# Patient Record
Sex: FEMALE | Race: WHITE | NOT HISPANIC OR LATINO | Employment: UNEMPLOYED | URBAN - METROPOLITAN AREA
[De-identification: names, ages, dates, MRNs, and addresses within clinical notes are randomized per-mention and may not be internally consistent; named-entity substitution may affect disease eponyms.]

---

## 2017-10-14 LAB — HBA1C MFR BLD HPLC: 5.1 %

## 2018-11-08 ENCOUNTER — OFFICE VISIT (OUTPATIENT)
Dept: URGENT CARE | Facility: CLINIC | Age: 33
End: 2018-11-08
Payer: COMMERCIAL

## 2018-11-08 VITALS
TEMPERATURE: 98.5 F | WEIGHT: 170 LBS | SYSTOLIC BLOOD PRESSURE: 110 MMHG | HEART RATE: 100 BPM | RESPIRATION RATE: 16 BRPM | DIASTOLIC BLOOD PRESSURE: 60 MMHG | OXYGEN SATURATION: 97 %

## 2018-11-08 DIAGNOSIS — J01.90 ACUTE SINUSITIS, RECURRENCE NOT SPECIFIED, UNSPECIFIED LOCATION: Primary | ICD-10-CM

## 2018-11-08 PROCEDURE — G0382 LEV 3 HOSP TYPE B ED VISIT: HCPCS | Performed by: FAMILY MEDICINE

## 2018-11-08 RX ORDER — AZITHROMYCIN 250 MG/1
TABLET, FILM COATED ORAL
Qty: 6 TABLET | Refills: 0 | Status: SHIPPED | OUTPATIENT
Start: 2018-11-08 | End: 2018-11-13

## 2018-11-08 RX ORDER — UBIDECARENONE 75 MG
CAPSULE ORAL DAILY
COMMUNITY

## 2018-11-08 RX ORDER — PSEUDOEPHEDRINE HYDROCHLORIDE 30 MG/1
60 TABLET ORAL EVERY 6 HOURS PRN
Qty: 30 TABLET | Refills: 0 | Status: SHIPPED | OUTPATIENT
Start: 2018-11-08 | End: 2019-01-11 | Stop reason: ALTCHOICE

## 2018-11-08 NOTE — PATIENT INSTRUCTIONS
As you know, we are very limited in what we can use in pregnant women  Continue with his sinus rinses  Use the Zithromax which is approved for pregnancy if the decongestant is not sufficient

## 2018-11-08 NOTE — PROGRESS NOTES
Assessment/Plan:      Diagnoses and all orders for this visit:    Acute sinusitis, recurrence not specified, unspecified location  -     azithromycin (ZITHROMAX) 250 mg tablet; Take 2 tablets today then 1 tablet daily x 4 days  -     pseudoephedrine (SUDAFED) 30 mg tablet; Take 2 tablets (60 mg total) by mouth every 6 (six) hours as needed for congestion    Other orders  -     enoxaparin (LOVENOX) 40 mg/0 4 mL;   -     cyanocobalamin (VITAMIN B-12) 100 mcg tablet; Take by mouth daily  -     Prenatal MV-Min-Fe Fum-FA-DHA (PRENATAL 1 PO); Take by mouth          Subjective:     Patient ID: aJson Rudolph is a 35 y o  female  Patient is a 17-year-old female who is 26 weeks pregnant  She presents with a history of sinus pressure and congestion for the last week  She has congestion in the ears as well  She is coughing  It is not clear if she has been febrile  Review of Systems   Constitutional: Negative  HENT: Positive for congestion and sinus pressure  Eyes: Positive for discharge  Respiratory: Negative  Musculoskeletal: Negative  Objective:     Physical Exam   Constitutional: She is oriented to person, place, and time  She appears well-developed and well-nourished  HENT:   Head: Normocephalic and atraumatic  Eyes: Conjunctivae are normal    Neck: Normal range of motion  Cardiovascular: Normal rate and regular rhythm  Pulmonary/Chest: Effort normal and breath sounds normal    Neurological: She is alert and oriented to person, place, and time  Psychiatric: She has a normal mood and affect

## 2019-01-11 ENCOUNTER — OFFICE VISIT (OUTPATIENT)
Dept: FAMILY MEDICINE CLINIC | Facility: CLINIC | Age: 34
End: 2019-01-11
Payer: COMMERCIAL

## 2019-01-11 VITALS
HEART RATE: 115 BPM | OXYGEN SATURATION: 98 % | SYSTOLIC BLOOD PRESSURE: 112 MMHG | TEMPERATURE: 98.8 F | WEIGHT: 183.2 LBS | DIASTOLIC BLOOD PRESSURE: 72 MMHG

## 2019-01-11 DIAGNOSIS — Z79.01 CURRENT USE OF LONG TERM ANTICOAGULATION: ICD-10-CM

## 2019-01-11 DIAGNOSIS — E28.2 PCOS (POLYCYSTIC OVARIAN SYNDROME): ICD-10-CM

## 2019-01-11 DIAGNOSIS — Z3A.35 PREGNANCY WITH 35 COMPLETED WEEKS GESTATION: Primary | ICD-10-CM

## 2019-01-11 PROCEDURE — 99204 OFFICE O/P NEW MOD 45 MIN: CPT | Performed by: FAMILY MEDICINE

## 2019-01-11 NOTE — PROGRESS NOTES
Assessment/Plan:     Diagnoses and all orders for this visit:    Pregnancy with 35 completed weeks gestation    Current use of long term anticoagulation    PCOS (polycystic ovarian syndrome)      discussed with patient chronic conditions  She will follow up with her OBGYN stay through delivery  Recommend she come back in 2 months and we will consider her blood work at that time and what medications we will need  Overall she is stable  She is at high risk complications of pregnancy pregnancy due to her chronic medical conditions      Subjective:     Chief Complaint   Patient presents with   [address]     new patient and wants to talk about vitamins and other health issues         Patient ID: Priya Dasilva is a 35 y o  female  Patient is here as new patient  She reports that she is 35 weeks pregnant  She is high risk due to her history of blood clots  She was on warfarin for a while now she has been on Lovenox for her pregnancy  Will be switching to heparin in the near future for potential delivery  She has no acute physical complaints today  She has a history of PCOS which created some major health symptoms for her in the past but she is feeling stable for that right now        The following portions of the patient's history were reviewed and updated as appropriate: allergies, current medications, past family history, past medical history, past social history, past surgical history and problem list     Review of Systems   Constitutional: Negative  HENT: Negative  Eyes: Negative  Respiratory: Negative  Cardiovascular: Negative  Gastrointestinal: Negative  Endocrine: Negative  Genitourinary: Negative  Musculoskeletal: Negative  Skin: Negative  Allergic/Immunologic: Negative  Neurological: Negative  Hematological: Negative  Psychiatric/Behavioral: Negative  All other systems reviewed and are negative          Objective:    Vitals:    01/11/19 1249   BP: 112/72 BP Location: Right arm   Patient Position: Sitting   Cuff Size: Standard   Pulse: (!) 115   Temp: 98 8 °F (37 1 °C)   TempSrc: Tympanic   SpO2: 98%   Weight: 83 1 kg (183 lb 3 2 oz)          Physical Exam   Constitutional: She is oriented to person, place, and time  She appears well-developed and well-nourished  HENT:   Head: Normocephalic and atraumatic  Right Ear: External ear normal    Left Ear: External ear normal    Mouth/Throat: Oropharynx is clear and moist    Eyes: Pupils are equal, round, and reactive to light  Conjunctivae and EOM are normal    Neck: Normal range of motion  Cardiovascular: Normal rate, regular rhythm and normal heart sounds  Pulmonary/Chest: Effort normal and breath sounds normal    Abdominal: Soft  Bowel sounds are normal    Musculoskeletal: Normal range of motion  Neurological: She is alert and oriented to person, place, and time  She has normal reflexes  Skin: Skin is warm and dry  Psychiatric: She has a normal mood and affect  Her behavior is normal  Judgment and thought content normal    Nursing note and vitals reviewed

## 2019-04-29 ENCOUNTER — OFFICE VISIT (OUTPATIENT)
Dept: FAMILY MEDICINE CLINIC | Facility: CLINIC | Age: 34
End: 2019-04-29
Payer: COMMERCIAL

## 2019-04-29 VITALS
BODY MASS INDEX: 30.05 KG/M2 | TEMPERATURE: 98.7 F | DIASTOLIC BLOOD PRESSURE: 72 MMHG | WEIGHT: 176 LBS | OXYGEN SATURATION: 98 % | HEART RATE: 117 BPM | SYSTOLIC BLOOD PRESSURE: 118 MMHG | HEIGHT: 64 IN

## 2019-04-29 DIAGNOSIS — R73.01 IMPAIRED FASTING GLUCOSE: ICD-10-CM

## 2019-04-29 DIAGNOSIS — Z00.00 ENCOUNTER FOR WELL ADULT EXAM WITHOUT ABNORMAL FINDINGS: Primary | ICD-10-CM

## 2019-04-29 DIAGNOSIS — J30.9 ALLERGIC RHINITIS, UNSPECIFIED SEASONALITY, UNSPECIFIED TRIGGER: ICD-10-CM

## 2019-04-29 DIAGNOSIS — R53.83 FATIGUE, UNSPECIFIED TYPE: ICD-10-CM

## 2019-04-29 PROBLEM — E88.819 INSULIN RESISTANCE: Status: ACTIVE | Noted: 2019-04-29

## 2019-04-29 PROBLEM — Z87.42 HISTORY OF ABNORMAL CERVICAL PAP SMEAR: Status: ACTIVE | Noted: 2019-04-29

## 2019-04-29 PROBLEM — E88.81 INSULIN RESISTANCE: Status: ACTIVE | Noted: 2019-04-29

## 2019-04-29 PROBLEM — Z87.59 STATUS POST VACUUM-ASSISTED VAGINAL DELIVERY: Status: ACTIVE | Noted: 2019-01-27

## 2019-04-29 PROBLEM — O99.013 ANEMIA AFFECTING PREGNANCY IN THIRD TRIMESTER: Status: ACTIVE | Noted: 2018-11-26

## 2019-04-29 PROBLEM — Z80.3 FAMILY HISTORY OF BREAST CANCER: Status: ACTIVE | Noted: 2019-04-29

## 2019-04-29 PROBLEM — E78.5 DYSLIPIDEMIA: Status: ACTIVE | Noted: 2019-04-29

## 2019-04-29 PROCEDURE — 99395 PREV VISIT EST AGE 18-39: CPT | Performed by: FAMILY MEDICINE

## 2019-04-29 RX ORDER — MONTELUKAST SODIUM 10 MG/1
10 TABLET ORAL
Qty: 30 TABLET | Refills: 3 | Status: SHIPPED | OUTPATIENT
Start: 2019-04-29 | End: 2020-07-14 | Stop reason: ALTCHOICE

## 2019-05-06 ENCOUNTER — EVALUATION (OUTPATIENT)
Dept: PHYSICAL THERAPY | Facility: CLINIC | Age: 34
End: 2019-05-06
Payer: COMMERCIAL

## 2019-05-06 DIAGNOSIS — M62.89 PELVIC FLOOR DYSFUNCTION IN FEMALE: Primary | ICD-10-CM

## 2019-05-06 PROCEDURE — 97162 PT EVAL MOD COMPLEX 30 MIN: CPT | Performed by: PHYSICAL THERAPIST

## 2019-05-06 PROCEDURE — G8991 OTHER PT/OT GOAL STATUS: HCPCS | Performed by: PHYSICAL THERAPIST

## 2019-05-06 PROCEDURE — G8990 OTHER PT/OT CURRENT STATUS: HCPCS | Performed by: PHYSICAL THERAPIST

## 2019-05-07 ENCOUNTER — TRANSCRIBE ORDERS (OUTPATIENT)
Dept: PHYSICAL THERAPY | Facility: CLINIC | Age: 34
End: 2019-05-07

## 2019-05-07 DIAGNOSIS — M62.89 MYOFIBROSIS: Primary | ICD-10-CM

## 2019-05-09 ENCOUNTER — OFFICE VISIT (OUTPATIENT)
Dept: PHYSICAL THERAPY | Facility: CLINIC | Age: 34
End: 2019-05-09
Payer: COMMERCIAL

## 2019-05-09 DIAGNOSIS — M62.89 PELVIC FLOOR DYSFUNCTION IN FEMALE: Primary | ICD-10-CM

## 2019-05-09 PROCEDURE — 97110 THERAPEUTIC EXERCISES: CPT | Performed by: PHYSICAL THERAPIST

## 2019-05-09 PROCEDURE — 97112 NEUROMUSCULAR REEDUCATION: CPT | Performed by: PHYSICAL THERAPIST

## 2019-05-13 ENCOUNTER — OFFICE VISIT (OUTPATIENT)
Dept: PHYSICAL THERAPY | Facility: CLINIC | Age: 34
End: 2019-05-13
Payer: COMMERCIAL

## 2019-05-13 DIAGNOSIS — M62.89 PELVIC FLOOR DYSFUNCTION IN FEMALE: Primary | ICD-10-CM

## 2019-05-13 PROCEDURE — 97110 THERAPEUTIC EXERCISES: CPT | Performed by: PHYSICAL THERAPIST

## 2019-05-23 ENCOUNTER — OFFICE VISIT (OUTPATIENT)
Dept: PHYSICAL THERAPY | Facility: CLINIC | Age: 34
End: 2019-05-23
Payer: COMMERCIAL

## 2019-05-23 DIAGNOSIS — M62.89 PELVIC FLOOR DYSFUNCTION IN FEMALE: Primary | ICD-10-CM

## 2019-05-23 PROCEDURE — 97140 MANUAL THERAPY 1/> REGIONS: CPT | Performed by: PHYSICAL THERAPIST

## 2019-05-23 PROCEDURE — 97110 THERAPEUTIC EXERCISES: CPT | Performed by: PHYSICAL THERAPIST

## 2019-05-23 PROCEDURE — 97112 NEUROMUSCULAR REEDUCATION: CPT | Performed by: PHYSICAL THERAPIST

## 2019-05-30 ENCOUNTER — OFFICE VISIT (OUTPATIENT)
Dept: PHYSICAL THERAPY | Facility: CLINIC | Age: 34
End: 2019-05-30
Payer: COMMERCIAL

## 2019-05-30 DIAGNOSIS — M62.89 PELVIC FLOOR DYSFUNCTION IN FEMALE: Primary | ICD-10-CM

## 2019-05-30 PROCEDURE — 97110 THERAPEUTIC EXERCISES: CPT | Performed by: PHYSICAL THERAPIST

## 2019-05-30 PROCEDURE — 97140 MANUAL THERAPY 1/> REGIONS: CPT | Performed by: PHYSICAL THERAPIST

## 2019-05-30 PROCEDURE — 97112 NEUROMUSCULAR REEDUCATION: CPT | Performed by: PHYSICAL THERAPIST

## 2019-06-01 LAB
ALBUMIN SERPL-MCNC: 4.1 G/DL (ref 3.6–5.1)
ALBUMIN/GLOB SERPL: 1.4 (CALC) (ref 1–2.5)
ALP SERPL-CCNC: 124 U/L (ref 33–115)
ALT SERPL-CCNC: 116 U/L (ref 6–29)
AST SERPL-CCNC: 48 U/L (ref 10–30)
BASOPHILS # BLD AUTO: 29 CELLS/UL (ref 0–200)
BASOPHILS NFR BLD AUTO: 0.4 %
BILIRUB SERPL-MCNC: 0.4 MG/DL (ref 0.2–1.2)
BUN SERPL-MCNC: 15 MG/DL (ref 7–25)
BUN/CREAT SERPL: ABNORMAL (CALC) (ref 6–22)
CALCIUM SERPL-MCNC: 9.5 MG/DL (ref 8.6–10.2)
CHLORIDE SERPL-SCNC: 104 MMOL/L (ref 98–110)
CO2 SERPL-SCNC: 26 MMOL/L (ref 20–32)
CREAT SERPL-MCNC: 0.9 MG/DL (ref 0.5–1.1)
EOSINOPHIL # BLD AUTO: 183 CELLS/UL (ref 15–500)
EOSINOPHIL NFR BLD AUTO: 2.5 %
ERYTHROCYTE [DISTWIDTH] IN BLOOD BY AUTOMATED COUNT: 13.1 % (ref 11–15)
GLOBULIN SER CALC-MCNC: 3 G/DL (CALC) (ref 1.9–3.7)
GLUCOSE SERPL-MCNC: 81 MG/DL (ref 65–99)
HBA1C MFR BLD: 5.7 % OF TOTAL HGB
HCT VFR BLD AUTO: 38.7 % (ref 35–45)
HGB BLD-MCNC: 12.8 G/DL (ref 11.7–15.5)
LYMPHOCYTES # BLD AUTO: 2665 CELLS/UL (ref 850–3900)
LYMPHOCYTES NFR BLD AUTO: 36.5 %
MCH RBC QN AUTO: 27.6 PG (ref 27–33)
MCHC RBC AUTO-ENTMCNC: 33.1 G/DL (ref 32–36)
MCV RBC AUTO: 83.4 FL (ref 80–100)
MONOCYTES # BLD AUTO: 504 CELLS/UL (ref 200–950)
MONOCYTES NFR BLD AUTO: 6.9 %
NEUTROPHILS # BLD AUTO: 3920 CELLS/UL (ref 1500–7800)
NEUTROPHILS NFR BLD AUTO: 53.7 %
PLATELET # BLD AUTO: 253 THOUSAND/UL (ref 140–400)
PMV BLD REES-ECKER: 11.5 FL (ref 7.5–12.5)
POTASSIUM SERPL-SCNC: 4.3 MMOL/L (ref 3.5–5.3)
PROT SERPL-MCNC: 7.1 G/DL (ref 6.1–8.1)
RBC # BLD AUTO: 4.64 MILLION/UL (ref 3.8–5.1)
SL AMB EGFR AFRICAN AMERICAN: 97 ML/MIN/1.73M2
SL AMB EGFR NON AFRICAN AMERICAN: 84 ML/MIN/1.73M2
SODIUM SERPL-SCNC: 136 MMOL/L (ref 135–146)
TSH SERPL-ACNC: 2.03 MIU/L
WBC # BLD AUTO: 7.3 THOUSAND/UL (ref 3.8–10.8)

## 2019-06-03 DIAGNOSIS — R74.8 ELEVATED LIVER ENZYMES: Primary | ICD-10-CM

## 2019-06-04 ENCOUNTER — TELEPHONE (OUTPATIENT)
Dept: FAMILY MEDICINE CLINIC | Facility: CLINIC | Age: 34
End: 2019-06-04

## 2019-06-04 DIAGNOSIS — R74.8 ELEVATED LIVER ENZYMES: Primary | ICD-10-CM

## 2019-06-06 ENCOUNTER — OFFICE VISIT (OUTPATIENT)
Dept: PHYSICAL THERAPY | Facility: CLINIC | Age: 34
End: 2019-06-06
Payer: COMMERCIAL

## 2019-06-06 DIAGNOSIS — M62.89 PELVIC FLOOR DYSFUNCTION IN FEMALE: Primary | ICD-10-CM

## 2019-06-06 PROCEDURE — 97530 THERAPEUTIC ACTIVITIES: CPT | Performed by: PHYSICAL THERAPIST

## 2019-06-06 PROCEDURE — 97140 MANUAL THERAPY 1/> REGIONS: CPT | Performed by: PHYSICAL THERAPIST

## 2019-06-13 ENCOUNTER — OFFICE VISIT (OUTPATIENT)
Dept: PHYSICAL THERAPY | Facility: CLINIC | Age: 34
End: 2019-06-13
Payer: COMMERCIAL

## 2019-06-13 DIAGNOSIS — M62.89 PELVIC FLOOR DYSFUNCTION IN FEMALE: Primary | ICD-10-CM

## 2019-06-13 PROCEDURE — 97112 NEUROMUSCULAR REEDUCATION: CPT | Performed by: PHYSICAL THERAPIST

## 2019-06-13 PROCEDURE — 97530 THERAPEUTIC ACTIVITIES: CPT | Performed by: PHYSICAL THERAPIST

## 2019-06-17 ENCOUNTER — OFFICE VISIT (OUTPATIENT)
Dept: PHYSICAL THERAPY | Facility: CLINIC | Age: 34
End: 2019-06-17
Payer: COMMERCIAL

## 2019-06-17 DIAGNOSIS — M62.89 PELVIC FLOOR DYSFUNCTION IN FEMALE: Primary | ICD-10-CM

## 2019-06-17 PROCEDURE — 97530 THERAPEUTIC ACTIVITIES: CPT | Performed by: PHYSICAL THERAPIST

## 2019-06-17 PROCEDURE — 97110 THERAPEUTIC EXERCISES: CPT | Performed by: PHYSICAL THERAPIST

## 2019-06-17 PROCEDURE — 97112 NEUROMUSCULAR REEDUCATION: CPT | Performed by: PHYSICAL THERAPIST

## 2019-07-01 ENCOUNTER — OFFICE VISIT (OUTPATIENT)
Dept: PHYSICAL THERAPY | Facility: CLINIC | Age: 34
End: 2019-07-01
Payer: COMMERCIAL

## 2019-07-01 DIAGNOSIS — M62.89 PELVIC FLOOR DYSFUNCTION IN FEMALE: Primary | ICD-10-CM

## 2019-07-01 PROCEDURE — G8990 OTHER PT/OT CURRENT STATUS: HCPCS | Performed by: PHYSICAL THERAPIST

## 2019-07-01 PROCEDURE — 97112 NEUROMUSCULAR REEDUCATION: CPT | Performed by: PHYSICAL THERAPIST

## 2019-07-01 PROCEDURE — G8991 OTHER PT/OT GOAL STATUS: HCPCS | Performed by: PHYSICAL THERAPIST

## 2019-07-01 PROCEDURE — 97110 THERAPEUTIC EXERCISES: CPT | Performed by: PHYSICAL THERAPIST

## 2019-07-01 PROCEDURE — 97140 MANUAL THERAPY 1/> REGIONS: CPT | Performed by: PHYSICAL THERAPIST

## 2019-07-01 NOTE — PROGRESS NOTES
PT Re-Evaluation     Today's date: 2019  Patient name: Justin Painting  : 1985  MRN: [id number]  Referring provider: Owen Chavez DO  Dx:   Encounter Diagnosis     ICD-10-CM    1  Pelvic floor dysfunction in female M62 89                   Assessment  Assessment details: Patient notes overall increase sitting tolerance with less tail bone pain, but continues to experience left hip/adductor pain with walking, sitting, and ADLs  Patient admits she has not been consistent in performing HEP prescribed as still adjusting to motherhood 5 months postpardum  Continues to demonstrate core weakness, decrease in left hip ROM and left lower extremity strength, weakness in pelvic floor muscles (PFM) with increase in PFM tonicity and tenderness L>R  Patient would benefit from further skilled PT to address core and pelvic floor weakness and left hip ROM and strength to maximize functional capacity to prior level of function  Impairments: abnormal muscle tone, abnormal or restricted ROM, activity intolerance, impaired physical strength, pain with function and poor posture     Goals  Impairment Goals in 8 weeks:  1  Improve MMT for pelvic floor to greater than or equal to 2+/5 in order to improve lumbopelvic stability - partially met  2  Increase hip strength to 4+/5 in order to maximize hip and lumbopelvic stability during all functional activities and improve pelvic floor functional abilities - partially met  3  Decrease PFM muscle tension to minimal tenderness - not met    Functional Goals in 8 weeks:  1  Patient is able to ambulate community distances pushing stroller without pelvic pain or right knee pain - not met  2  Patient is able to sit on firm chair without pelvic pain - partially met  3  Patient will report 75% reduction in discomfort with either palpation or intimacy in order to tolerate gynecological examination or intimacy and improve quality of life  Added 19: 4   Patient is able stand up from kneel position without groin pain    Plan  Patient would benefit from: skilled physical therapy  Planned therapy interventions: joint mobilization, manual therapy, neuromuscular re-education, patient education, postural training, home exercise program, therapeutic exercise, therapeutic activities and stretching  Frequency: 2x week  Duration in weeks: 8  Plan of Care expiration date: 2019  Treatment plan discussed with: patient        Subjective Evaluation    History of Present Illness  Mechanism of injury: Patient notes left groin and back pain with walking around neighborhood, despite not pushing the baby stroller  Pt is able to sit on firmer chairs with less discomfort, but still notes pain with sitting in pews at [employer] and still unable to kneel during the service    Pain  Current pain ratin  At best pain rating: 3  At worst pain ratin  Location: Left pelvic Pain/Hip Adductor  Quality: dull ache and sharp  Progression: improved    Treatments  Current treatment: physical therapy  Patient Goals  Patient goals for therapy: increased strength, independence with ADLs/IADLs, decreased pain and increased motion          Objective     Active Range of Motion     Lumbar   Flexion: 55 degrees  with pain  Extension: 10 degrees   Left lateral flexion: 5 degrees       Right lateral flexion: 5 degrees   Left rotation:  Restriction level: minimal  Right rotation:  Restriction level: minimal    Additional Active Range of Motion Details  Hip PROM:  right hip: WNL  Left hip: tightness at end-range left hip flexion and end-range left hip IR lacking 25% PROM    Prone knee flexion: 130* R, 120* L    Strength/Myotome Testing     Left Hip   Planes of Motion   Flexion: 4-  Extension: 4  Abduction: 4+  Adduction: 3+  Internal rotation: 4+    Right Hip   Planes of Motion   Flexion: 4+  Extension: 4  Abduction: 4+  Adduction: 3+  Internal rotation: 4+    Left Knee   Flexion: 4+  Extension: 4+    Right Knee   Flexion: 4+  Extension: 4+  Pelvic Floor Exam   Position: supine exam    Diastatis   3" above umbilicus (# fingers): 0  Umbilicus (# fingers): 2  3" below umbilicus (# fingers): 0  Connective tissue integrity at linea alba: boggy  no tenderness at linea alba  able to engage transverse abdominis     Visual Inspection of Perineum:   Excursion of perineal body in cephalad direction with contraction of pelvic floor muscles (PFM): weak and unable to isolate without substitution  Excursion of perineal body in caudal direction with relaxation of pelvic floor muscles (PFM): delayed  and weak  Involuntary contraction with coughing: yes  Involuntary relaxation with bearing down: yes  Cotton swab test: non-tender    Pelvic Organ Prolapse   no pelvic organ prolapse    Pelvic Floor Muscle Exam     Palpation   Minimal tenderness on right in the bulbospongiosus, ischiocavernosus, puborectalis, pubococcygeus, iliococcygeus, coccygeus and obturator internus  Moderate tenderness on right in the super transverse perineal  Moderate tenderness on left in the bulbospongiosus, ischiocavernosus, super transverse perineal, puborectalis, pubococcygeus, iliococcygeus, coccygeus and obturator internus    Muscle Contraction: substitution  Breathing pattern with contraction: holding breath  Pelvic floor muscle relaxation is delayed and incomplete  50% pelvic floor relaxation  4 seconds required for complete relaxation       PERFECT Score   Power right: 2/5  Power left: 1+/5  Endurance (seconds to max): 1  Repetitions (before fatigue): 2  Fast flicks (in 10 seconds): 2                 Precautions: PCOS  Focus on core strength, PF, and hip ROM    Daily Treatment Diary     Manual  5/6 5/9 5/13 5/23 5/30 6/6 6/13 6/17 7/1    PFM STM nv   [address]    PA lumbar mobs prone    MONET     nv    Sacral stretch in child's pose    [address]         TPR to glut/IO     [address]       Hip PROM     [facility] [employer] [facility]        Exercise Diary  5/6 5/9 5/13 5/23 5/30 6/6 6/13 6/17 7/1 [address]        10"x5     Wide DKC       10"x5      Piriformis stretch 30"x3 L  30"x3 L 30"x3   30"x1 ea  30"x3 ea     TB Ext 10"x5 L 10"x5 ea 10"x5 ea    grn  5"x10 grn  5"x10     TB rows       grn  5"x10 katya  5"x10     Fiorella-cross PFM S nv            Seated Butterfly stretch         10"x10    Hip Add nv 5"x10 5"x10          TA+PF lift+hip add  5"x10 5"x10 5"x10 5"x10  5"x10 5"x10 5"x10    DLS: KFO   4"Z94 ea          DLS: Marching   9"Y27 ea  5"x10  Knee ext 5"x10 knee ext 5"x10 5"x10 5"x10    Hip Abd s/l   nv nv 10x ea  nv 10x ea     S/l clamshells   5"x10 ea  5"x10 ea 20x ea  5"x20     Stool Deep Squat stretch         30"x3    Quadruped leg ext    nv  5"x10 ea 5"x10 ea 5"x10 ea     Sit to stand      5x       Mini squats      10x 10x 10x     Step Ups      L2  10x ea  L3  10x ea     Bicep Curls/OH lift       2#  10x ea 4#  20x ea     Shoulder Flexion/Abd AROM       2#  10x ea 2# 10x  Flex  1# 10x abd         Modalities

## 2019-07-01 NOTE — PROGRESS NOTES
{SL AMB PT/OT NOTE [id number]}    Today's date: 2019  Patient name: Thalia Freeman  : 1985  MRN: [id number]  Referring provider: Gita Armas DO  Dx:   Encounter Diagnosis     ICD-10-CM    1  Pelvic floor dysfunction in female M62 89                   Assessment/Plan    Subjective    Objective               Precautions: PCOS  Focus on core strength, PF, and hip ROM    Daily Treatment Diary     Manual       PFM STM nv   [facility]         PA lumbar mobs prone    MONET         Sacral stretch in child's pose    [facility]         TPR to glut/IO     [facility]       Hip PROM     [facility]         Exercise Diary       Doorway Pec        10"x5     Wide DKC       10"x5      Piriformis stretch 30"x3 L  30"x3 L 30"x3   30"x1 ea  30"x3 ea     TB Ext 10"x5 L 10"x5 ea 10"x5 ea    grn  5"x10 grn  5"x10     TB rows       grn  5"x10 katya  5"x10     Fiorella-cross PFM S nv            TA nv 5"x10 5"x10          Hip Add nv 5"x10 5"x10          TA+PF lift+hip add  5"x10 5"x10 5"x10 5"x10  5"x10 5"x10     DLS: KFO   7"P73 ea          DLS: Marching   7"A25 ea  5"x10  Knee ext 5"x10 knee ext 5"x10 5"x10     Hip Abd s/l   nv nv 10x ea  nv 10x ea     S/l clamshells   5"x10 ea  5"x10 ea 20x ea  5"x20     Box lifts    30"x3 ea    Floor and low mat  10x ea     Quadruped leg ext    nv  5"x10 ea 5"x10 ea 5"x10 ea     Sit to stand      5x       Mini squats      10x 10x 10x     Step Ups      L2  10x ea  L3  10x ea     Bicep Curls/OH lift       2#  10x ea 4#  20x ea     Shoulder Flexion/Abd AROM       2#  10x ea 2# 10x  Flex  1# 10x abd         Modalities

## 2019-07-08 ENCOUNTER — OFFICE VISIT (OUTPATIENT)
Dept: PHYSICAL THERAPY | Facility: CLINIC | Age: 34
End: 2019-07-08
Payer: COMMERCIAL

## 2019-07-08 DIAGNOSIS — M62.89 PELVIC FLOOR DYSFUNCTION IN FEMALE: Primary | ICD-10-CM

## 2019-07-08 PROCEDURE — 97112 NEUROMUSCULAR REEDUCATION: CPT

## 2019-07-08 PROCEDURE — 97110 THERAPEUTIC EXERCISES: CPT

## 2019-07-08 PROCEDURE — 97140 MANUAL THERAPY 1/> REGIONS: CPT

## 2019-07-08 NOTE — PROGRESS NOTES
Daily Note     Today's date: 2019  Patient name: Sunny Ag  : 1985  MRN: [id number]  Referring provider: Nancy Villegas DO  Dx:   Encounter Diagnosis     ICD-10-CM    1  Pelvic floor dysfunction in female M62 89                   Subjective: Pt states that her LB is bothering her a little more today across her whole LB  Objective: See treatment diary below      Precautions: PCOS  Focus on core strength, PF, and hip ROM    Daily Treatment Diary     Manual   7/8   PFM STM nv   [address]     MONET TH   PA lumbar mobs prone    MONET     nv nv   Sacral stretch in child's pose    MONET         TPR to glut/IO     [address] MONET       Hip PROM     [address] [place] [address] [facility] [place]       Exercise Diary   7/8   Doorway Pec        10"x5     Wide DKC       10"x5   SKTC  10"x5   Piriformis stretch 30"x3 L  30"x3 L 30"x3   30"x1 ea  30"x3 ea  30"x3  ea   TB Ext 10"x5 L 10"x5 ea 10"x5 ea    grn  5"x10 grn  5"x10  grn  5"x10   TB rows       grn  5"x10 katya  5"x10  grn 5"x10   ITB stretch supine nv         30"x3 ea   Seated Butterfly stretch         10"x10    Hip Add nv 5"x10 5"x10          TA+PF lift+hip add  5"x10 5"x10 5"x10 5"x10  5"x10 5"x10 5"x10    DLS: KFO   7"U22 ea          DLS: Marching   1"Q66 ea  5"x10  Knee ext 5"x10 knee ext 5"x10 5"x10 5"x10 5"x10   Hip Abd s/l   nv nv 10x ea  nv 10x ea     S/l clamshells   5"x10 ea  5"x10 ea 20x ea  5"x20     Stool Deep Squat stretch         30"x3    Quadruped leg ext    nv  5"x10 ea 5"x10 ea 5"x10 ea  5"x10 ea   Sit to stand      5x       Mini squats      10x 10x 10x  10x   Step Ups      L2  10x ea  L3  10x ea  L3  10x ea   Bicep Curls/OH lift       2#  10x ea 4#  20x ea  4# 20x ea   Shoulder Flexion/Abd AROM       2#  10x ea 2# 10x  Flex  1# 10x abd  2# 10x flex   Open book stretch          10"x5 ea       Modalities                                                           Assessment: Tolerated treatment well    Pt performed strengthening ex's with no reports of increased sx's  Added open book stretch and ITB stretch with pt reporting decreased tightness  Trigger points noted in B/L piriformis with L>R  Decreased tenderness and pain noted after manual therapy  Pt progressing slowly towards her long term goals        Plan: Continue per plan of care

## 2019-07-15 ENCOUNTER — APPOINTMENT (OUTPATIENT)
Dept: PHYSICAL THERAPY | Facility: CLINIC | Age: 34
End: 2019-07-15
Payer: COMMERCIAL

## 2019-07-22 ENCOUNTER — OFFICE VISIT (OUTPATIENT)
Dept: PHYSICAL THERAPY | Facility: CLINIC | Age: 34
End: 2019-07-22
Payer: COMMERCIAL

## 2019-07-22 DIAGNOSIS — M62.89 PELVIC FLOOR DYSFUNCTION IN FEMALE: Primary | ICD-10-CM

## 2019-07-22 PROCEDURE — 97110 THERAPEUTIC EXERCISES: CPT | Performed by: PHYSICAL THERAPIST

## 2019-07-22 PROCEDURE — 97140 MANUAL THERAPY 1/> REGIONS: CPT | Performed by: PHYSICAL THERAPIST

## 2019-07-22 NOTE — PROGRESS NOTES
Daily Note     Today's date: 2019  Patient name: Miguelito Wu  : 1985  MRN: [id number]  Referring provider: Deanna Key DO  Dx:   Encounter Diagnosis     ICD-10-CM    1  Pelvic floor dysfunction in female M62 89                   Subjective: Pt notes making strides to decrease stress on her body with delegating tasks such as bathing her son and carrying son up and down the stairs when help is available  Pt continues to c/o lower back pain with climbing stairs, especially with carrying son or laundry up  Pt notes not feeling level when she sits in the car  [facility] but worries about reinjury of back and pelvic issues due to severity of symptoms  Objective: See treatment diary below      Assessment: Performed hip MET to correct inferior rotation of left ASIS, pt noted feeling more level upon walking post MET  Performed STM to PFM, with muscle tension and tenderness noted L>R layer 1-2 PFM with decrease in tension noted with STM  Patient would benefit from continued PT to address postpardum pelvic floor and core issues affecting patient' functional capacity  Plan: Continue per plan of care  Re-eval next visit          Precautions: PCOS  Focus on core strength, PF, and hip ROM    Daily Treatment Diary     Manual              PFM STM MONET            PA lumbar mobs prone             Sacral stretch in child's pose             TPR to glut/IO             Hip PROM Hip MET  MONET                Exercise Diary              Doorway Pec             Wide DKC             Piriformis stretch             TB Ext             TB rows             ITB stretch supine             Seated Butterfly stretch             Hip Add nv            TA+PF lift+hip add             DLS: KFO             DLS: Marching             Hip Abd s/l             S/l clamshells             Stool Deep Squat stretch             Quadruped leg ext             Sit to stand             Mini squats Step Ups             Bicep Curls/OH lift             Shoulder Flexion/Abd AROM             Open book stretch LTR  10"x10                Modalities

## 2019-07-29 ENCOUNTER — EVALUATION (OUTPATIENT)
Dept: PHYSICAL THERAPY | Facility: CLINIC | Age: 34
End: 2019-07-29
Payer: COMMERCIAL

## 2019-07-29 DIAGNOSIS — M62.89 PELVIC FLOOR DYSFUNCTION IN FEMALE: Primary | ICD-10-CM

## 2019-07-29 PROCEDURE — G8991 OTHER PT/OT GOAL STATUS: HCPCS | Performed by: PHYSICAL THERAPIST

## 2019-07-29 PROCEDURE — G8990 OTHER PT/OT CURRENT STATUS: HCPCS | Performed by: PHYSICAL THERAPIST

## 2019-07-29 PROCEDURE — 97110 THERAPEUTIC EXERCISES: CPT | Performed by: PHYSICAL THERAPIST

## 2019-07-29 PROCEDURE — 97140 MANUAL THERAPY 1/> REGIONS: CPT | Performed by: PHYSICAL THERAPIST

## 2019-07-29 NOTE — PROGRESS NOTES
PT Re-Evaluation     Today's date: 2019  Patient name: Berhane Marley  : 1985  MRN: [id number]  Referring provider: Jenny Shukla DO  Dx:   Encounter Diagnosis     ICD-10-CM    1  Pelvic floor dysfunction in female M62 89                   Assessment  Assessment details: Patient reports 50% improvement pelvic pain postpartum with overall decrease in tail bone pain and back pain and improvement in light ADLs, but still unable to resume exercise activities of walking, gym exercises, or yoga, pushing baby stroller in community, or bathing her son in the bathtub due to back pain/pelvic pain  Pt notes she was able to kneel in [employer] with less difficulty and less right groin and knee pain  Demonstrates improvement in lower extremity strength and core activation  Assessed lumbar ROM with increase lumbar lordosis noted  Continues to demonstrate weakness and delayed relaxation of PFM with greater tension in left PFM compared to right side  Patient would benefit from further skilled PT to decrease pain and maximize functional capacity to prior level of function  Impairments: abnormal or restricted ROM, activity intolerance, impaired physical strength, pain with function, poor posture  and poor body mechanics    Goals  Impairment Goals in 8 weeks:  1  Improve MMT for pelvic floor to greater than or equal to 2+/5 in order to improve lumbopelvic stability  2  Increase hip strength to 4+/5 in order to maximize hip and lumbopelvic stability during all functional activities and improve pelvic floor functional abilities  3  Decrease PFM muscle tension to minimal tenderness  Functional Goals in 8 weeks:  1  Patient is able to ambulate community distances pushing stroller without pelvic pain or right knee pain - partially met  2  Patient is able to sit on firm chair without pelvic pain - partially met  3   Patient will report 75% reduction in discomfort with either palpation or intimacy in order to tolerate gynecological examination or intimacy and improve quality of life - partially met  Added 19: 4   Patient is able to kneel and bathe her baby in bathtub without back pain    Plan  Patient would benefit from: skilled physical therapy  Planned therapy interventions: manual therapy, neuromuscular re-education, patient education, postural training, home exercise program, strengthening and stretching  Frequency: 1x week  Duration in weeks: 8  Plan of Care expiration date: 2019  Treatment plan discussed with: patient        Subjective Evaluation    History of Present Illness  Mechanism of injury: Bladder Function:     Voiding Difficulties: complete emptying      Voiding frequency: every 1-2 hours    Urinary leakage: no urine leakage    Urinary leakage aggravated by: post-void dribble    Nocturia (episodes per night): 1-2    Bowel Function:     Voiding Difficulties: none    Bowel frequency: daily (once)    Stool softener use: no stool softeners    Sexual Function:    50% decrease in pain with intercourse, but still notes soreness post intercourse    Lumbar Pain:  At worst: 8/10 (bathing baby)  At least: 1/10 (at rest)  Pain  At best pain ratin  At worst pain rating: 3  Location: Left pelvic Pain/Hip Adductor  Quality: dull ache    Patient Goals  Patient goals for therapy: decreased pain, increased strength, independence with ADLs/IADLs, return to work, return to sport/leisure activities and increased motion          Objective     Active Range of Motion     Lumbar   Flexion: 45 degrees   Extension: 12 degrees   Left lateral flexion: 5 degrees       Right lateral flexion: 5 degrees     Additional Active Range of Motion Details  Standing lumbar posture: 25* lumbar lordosis    Leg length appear equal    Strength/Myotome Testing     Left Hip   Planes of Motion   Flexion: 4  Extension: 3+  Abduction: 4  Adduction: 3+    Right Hip   Planes of Motion   Flexion: 4  Extension: 3+  Abduction: 4  Adduction: 3+    Left Knee Flexion: 4+  Extension: 4+    Right Knee   Flexion: 4+  Extension: 4+    Additional Strength Details  Prone knee flexion: 120* R, 130* L (pulling in quad)    Hip PROM: right WNL  Left WFL except pain with end-range left hip flexion and lacking 25% left hip IR PROM  Pelvic Floor Exam   Position: supine exam    Diastatis   3" above umbilicus (# fingers): 0  Umbilicus (# fingers): 2  3" below umbilicus (# fingers): 0  Connective tissue integrity at linea alba: firm  no tenderness at linea alba  able to engage transverse abdominis     General Perineum Exam:   Positive for no pelvic organ prolapse at rest      Visual Inspection of Perineum:   Excursion of perineal body in cephalad direction with contraction of pelvic floor muscles (PFM): weak  Excursion of perineal body in caudal direction with relaxation of pelvic floor muscles (PFM): delayed  and weak    Pelvic Organ Prolapse   no pelvic organ prolapse  At rest: none  With bearing down: none    Pelvic Floor Muscle Exam     Palpation   Moderate increased muscle tension in the bulbospongiosus, ischiocavernosus, super transverse perineal, puborectalis, pubococcygeus and coccygeus  Minimal tenderness on right in the bulbospongiosus, ischiocavernosus, super transverse perineal, puborectalis, pubococcygeus, iliococcygeus and coccygeus  Moderate tenderness on left in the bulbospongiosus, ischiocavernosus, super transverse perineal, puborectalis, pubococcygeus, iliococcygeus and coccygeus    Muscle Contraction: well isolated  Breathing pattern with contraction: within normal limits  Pelvic floor muscle relaxation is delayed and incomplete       PERFECT Score   Power right: 2/5  Power left: 1+/5  Endurance (seconds to max): 3  Repetitions (before fatigue): 3  Fast flicks (in 10 seconds): 3               Precautions: PCOS  Focus on core strength, PF, and hip ROM    Daily Treatment Diary     Manual  7/22 7/29           PFM [facility] [address] [facility]           PA lumbar mobs prone Sacral stretch in child's pose             TPR to glut/IO             Hip PROM Hip MET  MNOET np               Exercise Diary  7/22 7/29           Doorway Pec             Wide DKC             Piriformis stretch             TB Ext             TB rows             ITB stretch supine             Seated Butterfly stretch             Hip Add nv            TA+PF lift+hip add             DLS: KFO             DLS: Marching             Hip Abd s/l             S/l clamshells             Stool Deep Squat stretch             Quadruped leg ext             Sit to stand             Mini squats             Step Ups             Bicep Curls/OH lift             Shoulder Flexion/Abd AROM             Open book stretch LTR  10"x10                Modalities

## 2019-07-29 NOTE — LETTER
2019    Lucio Melton [address]    Patient: Kimberly Pardo   YOB: 1985   Date of Visit: 2019     Encounter Diagnosis     ICD-10-CM    1  Pelvic floor dysfunction in female M64 80        Dear Dr Rosalinda Sexton:    Thank you for your recent referral of Kimberly Pardo  Please review the attached evaluation summary from Marilu's recent visit  Please verify that you agree with the plan of care by signing the attached order  If you have any questions or concerns, please do not hesitate to call  I sincerely appreciate the opportunity to share in the care of one of your patients and hope to have another opportunity to work with you in the near future  Sincerely,    Ravi Porras, PT      Referring Provider:      I certify that I have read the below Plan of Care and certify the need for these services furnished under this plan of treatment while under my care  Lucio Melton DO  [address]: [phone]          PT Re-Evaluation     Today's date: 2019  Patient name: Kimberly Pardo  : 1985  MRN: [id number]  Referring provider: Paz Monterroso DO  Dx:   Encounter Diagnosis     ICD-10-CM    1  Pelvic floor dysfunction in female M62 89                   Assessment  Assessment details: Patient reports 50% improvement pelvic pain postpartum with overall decrease in tail bone pain and back pain and improvement in light ADLs, but still unable to resume exercise activities of walking, gym exercises, or yoga, pushing baby stroller in community, or bathing her son in the bathtub due to back pain/pelvic pain  Pt notes she was able to kneel in [employer] with less difficulty and less right groin and knee pain  Demonstrates improvement in lower extremity strength and core activation  Assessed lumbar ROM with increase lumbar lordosis noted   Continues to demonstrate weakness and delayed relaxation of PFM with greater tension in left PFM compared to right side  Patient would benefit from further skilled PT to decrease pain and maximize functional capacity to prior level of function  Impairments: abnormal or restricted ROM, activity intolerance, impaired physical strength, pain with function, poor posture  and poor body mechanics    Goals  Impairment Goals in 8 weeks:  1  Improve MMT for pelvic floor to greater than or equal to 2+/5 in order to improve lumbopelvic stability  2  Increase hip strength to 4+/5 in order to maximize hip and lumbopelvic stability during all functional activities and improve pelvic floor functional abilities  3  Decrease PFM muscle tension to minimal tenderness  Functional Goals in 8 weeks:  1  Patient is able to ambulate community distances pushing stroller without pelvic pain or right knee pain - partially met  2  Patient is able to sit on firm chair without pelvic pain - partially met  3  Patient will report 75% reduction in discomfort with either palpation or intimacy in order to tolerate gynecological examination or intimacy and improve quality of life - partially met  Added 7/29/19: 4   Patient is able to kneel and bathe her baby in bathtub without back pain    Plan  Patient would benefit from: skilled physical therapy  Planned therapy interventions: manual therapy, neuromuscular re-education, patient education, postural training, home exercise program, strengthening and stretching  Frequency: 1x week  Duration in weeks: 8  Plan of Care expiration date: 9/23/2019  Treatment plan discussed with: patient        Subjective Evaluation    History of Present Illness  Mechanism of injury: Bladder Function:     Voiding Difficulties: complete emptying      Voiding frequency: every 1-2 hours    Urinary leakage: no urine leakage    Urinary leakage aggravated by: post-void dribble    Nocturia (episodes per night): 1-2    Bowel Function:     Voiding Difficulties: none    Bowel frequency: daily (once)    Stool softener use: no stool softeners    Sexual Function:    50% decrease in pain with intercourse, but still notes soreness post intercourse    Lumbar Pain:  At worst: 8/10 (bathing baby)  At least: 1/10 (at rest)  Pain  At best pain ratin  At worst pain rating: 3  Location: Left pelvic Pain/Hip Adductor  Quality: dull ache    Patient Goals  Patient goals for therapy: decreased pain, increased strength, independence with ADLs/IADLs, return to work, return to sport/leisure activities and increased motion          Objective     Active Range of Motion     Lumbar   Flexion: 45 degrees   Extension: 12 degrees   Left lateral flexion: 5 degrees       Right lateral flexion: 5 degrees     Additional Active Range of Motion Details  Standing lumbar posture: 25* lumbar lordosis    Leg length appear equal    Strength/Myotome Testing     Left Hip   Planes of Motion   Flexion: 4  Extension: 3+  Abduction: 4  Adduction: 3+    Right Hip   Planes of Motion   Flexion: 4  Extension: 3+  Abduction: 4  Adduction: 3+    Left Knee   Flexion: 4+  Extension: 4+    Right Knee   Flexion: 4+  Extension: 4+    Additional Strength Details  Prone knee flexion: 120* R, 130* L (pulling in quad)    Hip PROM: right WNL  Left WFL except pain with end-range left hip flexion and lacking 25% left hip IR PROM  Pelvic Floor Exam   Position: supine exam    Diastatis   3" above umbilicus (# fingers): 0  Umbilicus (# fingers): 2  3" below umbilicus (# fingers): 0  Connective tissue integrity at linea alba: firm  no tenderness at linea alba  able to engage transverse abdominis     General Perineum Exam:   Positive for no pelvic organ prolapse at rest      Visual Inspection of Perineum:   Excursion of perineal body in cephalad direction with contraction of pelvic floor muscles (PFM): weak  Excursion of perineal body in caudal direction with relaxation of pelvic floor muscles (PFM): delayed  and weak    Pelvic Organ Prolapse   no pelvic organ prolapse  At rest: none  With bearing down: none    Pelvic Floor Muscle Exam     Palpation   Moderate increased muscle tension in the bulbospongiosus, ischiocavernosus, super transverse perineal, puborectalis, pubococcygeus and coccygeus  Minimal tenderness on right in the bulbospongiosus, ischiocavernosus, super transverse perineal, puborectalis, pubococcygeus, iliococcygeus and coccygeus  Moderate tenderness on left in the bulbospongiosus, ischiocavernosus, super transverse perineal, puborectalis, pubococcygeus, iliococcygeus and coccygeus    Muscle Contraction: well isolated  Breathing pattern with contraction: within normal limits  Pelvic floor muscle relaxation is delayed and incomplete       PERFECT Score   Power right: 2/5  Power left: 1+/5  Endurance (seconds to max): 3  Repetitions (before fatigue): 3  Fast flicks (in 10 seconds): 3               Precautions: PCOS  Focus on core strength, PF, and hip ROM    Daily Treatment Diary     Manual  7/22 7/29           PFM [facility] [address] [place]           PA lumbar mobs prone             Sacral stretch in child's pose             TPR to glut/IO             Hip PROM Hip MET  MONET np               Exercise Diary  7/22 7/29           Doorway Pec             Wide DKC             Piriformis stretch             TB Ext             TB rows             ITB stretch supine             Seated Butterfly stretch             Hip Add nv            TA+PF lift+hip add             DLS: KFO             DLS: Marching             Hip Abd s/l             S/l clamshells             Stool Deep Squat stretch             Quadruped leg ext             Sit to stand             Mini squats             Step Ups             Bicep Curls/OH lift             Shoulder Flexion/Abd AROM             Open book stretch LTR  10"x10                Modalities

## 2019-07-30 ENCOUNTER — TRANSCRIBE ORDERS (OUTPATIENT)
Dept: PHYSICAL THERAPY | Facility: CLINIC | Age: 34
End: 2019-07-30

## 2019-07-30 DIAGNOSIS — M62.89 MYOFIBROSIS: Primary | ICD-10-CM

## 2019-08-12 ENCOUNTER — OFFICE VISIT (OUTPATIENT)
Dept: PHYSICAL THERAPY | Facility: CLINIC | Age: 34
End: 2019-08-12
Payer: COMMERCIAL

## 2019-08-12 DIAGNOSIS — M62.89 PELVIC FLOOR DYSFUNCTION IN FEMALE: Primary | ICD-10-CM

## 2019-08-12 PROCEDURE — 97140 MANUAL THERAPY 1/> REGIONS: CPT | Performed by: PHYSICAL THERAPIST

## 2019-08-12 PROCEDURE — 97110 THERAPEUTIC EXERCISES: CPT | Performed by: PHYSICAL THERAPIST

## 2019-08-12 PROCEDURE — 97112 NEUROMUSCULAR REEDUCATION: CPT | Performed by: PHYSICAL THERAPIST

## 2019-08-12 NOTE — PROGRESS NOTES
Daily Note     Today's date: 2019  Patient name: Felipe Sainz  : 1985  MRN: [id number]  Referring provider: Rio Valente DO  Dx:   Encounter Diagnosis     ICD-10-CM    1  Pelvic floor dysfunction in female M62 89                   Subjective: Pt notes traveling a lot last week in the car which aggravated back pain  Pt notes minimal tail bone pain, but c/o lower back pain  Objective: See treatment diary below      Assessment: Pt required cues to maintain core stability during DLS:   Pt was challenged with hip abd in s/l and s/l clamshells  Pt noted relief in back pressure with SKC and lumbar rotation in s/l  Patient would benefit from continued PT      Plan: Continue per plan of care           Precautions: PCOS  Focus on core strength, PF, and hip ROM    Daily Treatment Diary     Manual            PFM [facility]           PA lumbar mobs prone             Sacral stretch in child's pose             TPR to glut/IO   [facility]          Hip PROM Hip MET  MONET np               Exercise Diary            Abdominal brace   5"x10          SKC   10"x5 ea          Piriformis stretch   30"x3 ea          TB Ext             TB rows             ITB stretch supine             Seated Butterfly stretch             Hip Add nv            TA+PF lift+hip add             DLS: KFO             DLS:    6"L31 ea          Hip Abd s/l   2x10 ea          S/l clamshells   5"x10          Stool Deep Squat stretch             Quadruped leg ext             Sit to stand             Mini squats             Step Ups             Bicep Curls/OH lift             Shoulder Flexion/Abd AROM             Open book stretch LTR  10"x10  lumbar rot in s/l  30"x3 ea              Modalities

## 2019-08-19 ENCOUNTER — OFFICE VISIT (OUTPATIENT)
Dept: PHYSICAL THERAPY | Facility: CLINIC | Age: 34
End: 2019-08-19
Payer: COMMERCIAL

## 2019-08-19 DIAGNOSIS — M62.89 PELVIC FLOOR DYSFUNCTION IN FEMALE: Primary | ICD-10-CM

## 2019-08-19 PROCEDURE — 97112 NEUROMUSCULAR REEDUCATION: CPT | Performed by: PHYSICAL THERAPIST

## 2019-08-19 PROCEDURE — 97140 MANUAL THERAPY 1/> REGIONS: CPT | Performed by: PHYSICAL THERAPIST

## 2019-08-19 NOTE — PROGRESS NOTES
Daily Note     Today's date: 2019  Patient name: Ghazal Blackwell  : 1985  MRN: [id number]  Referring provider: Maria Eugenia Khanna DO  Dx:   Encounter Diagnosis     ICD-10-CM    1  Pelvic floor dysfunction in female M62 89                   Subjective: Pt reports sitting a lot yesterday, which didn't aggravate lower back/tail bone pain, but notes stiffness in lower back  Objective: See treatment diary below      Assessment: Continues to demonstrate challenge with maintaining PPT with DLS Marching  Added DLS: KFO with appropriate core challenge  Patient would benefit from continued PT      Plan: Continue per plan of care        Precautions: PCOS  Focus on core strength, PF, and hip ROM    Daily Treatment Diary     Manual           PFM [facility] [address] [place]           PA lumbar mobs prone             Sacral stretch in child's pose             TPR to glut/IO   [address] [place]         Hip PROM Hip MET  MONET np               Exercise Diary           Abdominal brace   5"x10 5"x10         SKC   10"x5 ea          Piriformis stretch   30"x3 ea 30"x3 ea         TB Ext             TB rows             ITB stretch supine             Seated Butterfly stretch             Hip Add Iso    5"x10         TA+PF lift+hip add             DLS: KFO    1"L52         DLS: Marching   3"X72 ea 5"x10 ea         Hip Abd s/l   2x10 ea          S/l clamshells   5"x10 5"x10 ea         Stool Deep Squat stretch             Quadruped leg ext             Sit to stand             Mini squats             Step Ups             Bicep Curls/OH lift             Shoulder Flexion/Abd AROM             Open book stretch LTR  10"x10  lumbar rot in s/l  30"x3 ea              Modalities

## 2019-08-26 ENCOUNTER — OFFICE VISIT (OUTPATIENT)
Dept: PHYSICAL THERAPY | Facility: CLINIC | Age: 34
End: 2019-08-26
Payer: COMMERCIAL

## 2019-08-26 DIAGNOSIS — M62.89 PELVIC FLOOR DYSFUNCTION IN FEMALE: Primary | ICD-10-CM

## 2019-08-26 PROCEDURE — 97110 THERAPEUTIC EXERCISES: CPT | Performed by: PHYSICAL THERAPIST

## 2019-08-26 PROCEDURE — 97140 MANUAL THERAPY 1/> REGIONS: CPT | Performed by: PHYSICAL THERAPIST

## 2019-08-26 PROCEDURE — 97112 NEUROMUSCULAR REEDUCATION: CPT | Performed by: PHYSICAL THERAPIST

## 2019-08-26 NOTE — PROGRESS NOTES
Daily Note     Today's date: 2019  Patient name: Rocael Mclain  : 1985  MRN: [id number]  Referring provider: Amna Varma DO  Dx:   Encounter Diagnosis     ICD-10-CM    1  Pelvic floor dysfunction in female M62 89                   Subjective: Patient reports feeling sore in lower and upper back from holding baby a lot lately and not performing HEP over the weekend  Objective: See treatment diary below      Assessment: Pt tolerated exercises and manuals with some release in left glut soreness post TPR to OI  Demonstrates improvement in core stability during DLS marching following cues  Patient would benefit from continued PT      Plan: Continue per plan of care        Precautions: PCOS  Focus on core strength, PF, and hip ROM    Daily Treatment Diary     Manual          PFM [facility] [address] [place]           PA lumbar mobs prone     MONET        Sacral stretch in child's pose             TPR to glut/IO   [place]        Hip PROM Hip MET  MONET np               Exercise Diary          Abdominal brace   5"x10 5"x10 5"x10        SKC   10"x5 ea  10"x5 ea        Piriformis stretch   30"x3 ea 30"x3 ea 30"x3  ea        TB Ext             TB rows             ITB stretch supine             Seated Butterfly stretch             Hip Add Iso    5"x10         TA+PF lift+hip add             DLS: KFO    5"P66         DLS: Marching   5"K83 ea 5"x10 ea 5"x10 ea        Hip Abd s/l   2x10 ea  2x10 ea        S/l clamshells   5"x10 5"x10 ea 5"x20 ea        Stool Deep Squat stretch             Quadruped leg ext             Sit to stand             Mini squats             Step Ups             Bicep Curls/OH lift             Shoulder Flexion/Abd AROM             Open book stretch LTR  10"x10  lumbar rot in s/l  30"x3 ea              Modalities

## 2019-09-05 ENCOUNTER — APPOINTMENT (OUTPATIENT)
Dept: PHYSICAL THERAPY | Facility: CLINIC | Age: 34
End: 2019-09-05
Payer: COMMERCIAL

## 2019-09-06 ENCOUNTER — OFFICE VISIT (OUTPATIENT)
Dept: FAMILY MEDICINE CLINIC | Facility: CLINIC | Age: 34
End: 2019-09-06
Payer: COMMERCIAL

## 2019-09-06 VITALS
SYSTOLIC BLOOD PRESSURE: 104 MMHG | BODY MASS INDEX: 31.14 KG/M2 | HEIGHT: 64 IN | DIASTOLIC BLOOD PRESSURE: 70 MMHG | WEIGHT: 182.4 LBS | HEART RATE: 110 BPM | OXYGEN SATURATION: 98 % | TEMPERATURE: 99.9 F

## 2019-09-06 DIAGNOSIS — E34.9 HORMONAL DISORDER: Primary | ICD-10-CM

## 2019-09-06 DIAGNOSIS — E55.9 VITAMIN D DEFICIENCY: ICD-10-CM

## 2019-09-06 DIAGNOSIS — R53.82 CHRONIC FATIGUE: ICD-10-CM

## 2019-09-06 DIAGNOSIS — R74.8 ELEVATED LIVER ENZYMES: ICD-10-CM

## 2019-09-06 PROCEDURE — 3008F BODY MASS INDEX DOCD: CPT | Performed by: FAMILY MEDICINE

## 2019-09-06 PROCEDURE — 99214 OFFICE O/P EST MOD 30 MIN: CPT | Performed by: FAMILY MEDICINE

## 2019-09-06 NOTE — PROGRESS NOTES
Assessment/Plan:       Diagnoses and all orders for this visit:    Hormonal disorder  -     CBC and differential; Future  -     Comprehensive metabolic panel; Future  -     TSH, 3rd generation with Free T4 reflex; Future  -     Iron; Future  -     Testosterone; Future  -     Estrogens, total; Future  -     FSH and LH; Future  -     CBC and differential  -     Comprehensive metabolic panel  -     TSH, 3rd generation with Free T4 reflex  -     Iron  -     Testosterone  -     Estrogens, total  -     FSH and LH    Vitamin D deficiency  -     Vitamin D 1,25 dihydroxy; Future  -     Vitamin D 1,25 dihydroxy    Chronic fatigue  -     CBC and differential; Future  -     Comprehensive metabolic panel; Future  -     Vitamin B12; Future  -     TSH, 3rd generation with Free T4 reflex; Future  -     Sedimentation rate, automated; Future  -     Iron; Future  -     CBC and differential  -     Comprehensive metabolic panel  -     Vitamin B12  -     TSH, 3rd generation with Free T4 reflex  -     Sedimentation rate, automated  -     Iron    Elevated liver enzymes  -     CBC and differential; Future  -     Comprehensive metabolic panel; Future  -     Urinalysis with reflex to microscopic; Future  -     Sedimentation rate, automated; Future  -     Lyme Antibody Profile with reflex to WB; Future  -     Hepatitis panel, acute; Future  -     Iron; Future  -     Gamma GT; Future  -     Amylase; Future  -     Lipase;  Future  -     CBC and differential  -     Comprehensive metabolic panel  -     Urinalysis with reflex to microscopic  -     Sedimentation rate, automated  -     Lyme Antibody Profile with reflex to WB  -     Hepatitis panel, acute  -     Iron  -     Gamma GT  -     Amylase  -     Lipase      labs ordered  May need to follow up with her endocrinologist  Will wait and see what the lab results show  She can follow up after the lab results if the      Subjective:     Chief Complaint   Patient presents with    Fatigue feeling tired, fatigued for three weeks  32 weeks post partum         Patient ID: Felipe Sainz is a 35 y o  female  Patient here today for multiple complaints  She states that she is still dealing with her pelvic floor dysfunction after pregnancy and delivery  She states she just feels not well she feels that something is off  She had history of very low vitamin-D she feels achy fatigued  This will getting worse over the past few weeks      The following portions of the patient's history were reviewed and updated as appropriate: allergies, current medications, past family history, past medical history, past social history, past surgical history and problem list     Review of Systems   Constitutional: Positive for activity change and fatigue  HENT: Negative  Eyes: Negative  Respiratory: Negative  Cardiovascular: Negative  Gastrointestinal: Negative  Endocrine: Negative  Genitourinary: Negative  Musculoskeletal: Negative  Skin: Negative  Allergic/Immunologic: Negative  Neurological: Negative  Hematological: Negative  Psychiatric/Behavioral: Positive for dysphoric mood  All other systems reviewed and are negative  Objective:    Vitals:    09/06/19 0948   BP: 104/70   BP Location: Left arm   Patient Position: Sitting   Cuff Size: Large   Pulse: (!) 110   Temp: 99 9 °F (37 7 °C)   TempSrc: Tympanic   SpO2: 98%   Weight: 82 7 kg (182 lb 6 4 oz)   Height: 5' 3 75" (1 619 m)          Physical Exam   Constitutional: She is oriented to person, place, and time  She appears well-developed and well-nourished  HENT:   Head: Normocephalic and atraumatic  Right Ear: External ear normal    Left Ear: External ear normal    Mouth/Throat: Oropharynx is clear and moist    Eyes: Pupils are equal, round, and reactive to light  Conjunctivae and EOM are normal    Neck: Normal range of motion  Cardiovascular: Normal rate, regular rhythm and normal heart sounds     Pulmonary/Chest: Effort normal and breath sounds normal    Abdominal: Soft  Bowel sounds are normal    Musculoskeletal: Normal range of motion  Neurological: She is alert and oriented to person, place, and time  She has normal reflexes  Skin: Skin is warm and dry  Psychiatric: She has a normal mood and affect  Her behavior is normal  Judgment and thought content normal    Nursing note and vitals reviewed

## 2019-09-12 ENCOUNTER — OFFICE VISIT (OUTPATIENT)
Dept: PHYSICAL THERAPY | Facility: CLINIC | Age: 34
End: 2019-09-12
Payer: COMMERCIAL

## 2019-09-12 DIAGNOSIS — M62.89 PELVIC FLOOR DYSFUNCTION IN FEMALE: Primary | ICD-10-CM

## 2019-09-12 LAB
1,25(OH)2D SERPL-MCNC: 35 PG/ML (ref 18–72)
1,25(OH)2D2 SERPL-MCNC: <8 PG/ML
1,25(OH)2D3 SERPL-MCNC: 35 PG/ML
ALBUMIN SERPL-MCNC: 4.3 G/DL (ref 3.6–5.1)
ALBUMIN/GLOB SERPL: 1.4 (CALC) (ref 1–2.5)
ALP SERPL-CCNC: 84 U/L (ref 33–115)
ALT SERPL-CCNC: 38 U/L (ref 6–29)
AMORPH SED URNS QL MICRO: ABNORMAL /HPF
AMYLASE SERPL-CCNC: 67 U/L (ref 21–101)
APPEARANCE UR: CLEAR
AST SERPL-CCNC: 19 U/L (ref 10–30)
B BURGDOR AB SER IA-ACNC: <0.9 INDEX
BACTERIA UR QL AUTO: ABNORMAL /HPF
BASOPHILS # BLD AUTO: 52 CELLS/UL (ref 0–200)
BASOPHILS NFR BLD AUTO: 0.6 %
BILIRUB SERPL-MCNC: 0.3 MG/DL (ref 0.2–1.2)
BILIRUB UR QL STRIP: NEGATIVE
BUN SERPL-MCNC: 16 MG/DL (ref 7–25)
BUN/CREAT SERPL: ABNORMAL (CALC) (ref 6–22)
CALCIUM SERPL-MCNC: 9.6 MG/DL (ref 8.6–10.2)
CAOX CRY #/AREA URNS HPF: ABNORMAL /HPF
CHLORIDE SERPL-SCNC: 106 MMOL/L (ref 98–110)
CO2 SERPL-SCNC: 24 MMOL/L (ref 20–32)
COLOR UR: ABNORMAL
CREAT SERPL-MCNC: 0.95 MG/DL (ref 0.5–1.1)
EOSINOPHIL # BLD AUTO: 148 CELLS/UL (ref 15–500)
EOSINOPHIL NFR BLD AUTO: 1.7 %
ERYTHROCYTE [DISTWIDTH] IN BLOOD BY AUTOMATED COUNT: 12.8 % (ref 11–15)
ERYTHROCYTE [SEDIMENTATION RATE] IN BLOOD BY WESTERGREN METHOD: 2 MM/H
ESTROGEN SERPL-MCNC: 389.4 PG/ML
FSH SERPL-ACNC: 7 MIU/ML
GGT SERPL-CCNC: 51 U/L (ref 3–50)
GLOBULIN SER CALC-MCNC: 3.1 G/DL (CALC) (ref 1.9–3.7)
GLUCOSE SERPL-MCNC: 87 MG/DL (ref 65–99)
GLUCOSE UR QL STRIP: NEGATIVE
HAV IGM SERPL QL IA: NORMAL
HBV CORE IGM SERPL QL IA: NORMAL
HBV SURFACE AG SERPL QL IA: NORMAL
HCT VFR BLD AUTO: 42.5 % (ref 35–45)
HCV AB S/CO SERPL IA: 0.05
HCV AB SERPL QL IA: NORMAL
HGB BLD-MCNC: 13.8 G/DL (ref 11.7–15.5)
HGB UR QL STRIP: NEGATIVE
HYALINE CASTS #/AREA URNS LPF: ABNORMAL /LPF
IRON SERPL-MCNC: 75 MCG/DL (ref 40–190)
KETONES UR QL STRIP: NEGATIVE
LEUKOCYTE ESTERASE UR QL STRIP: ABNORMAL
LH SERPL-ACNC: 17.6 MIU/ML
LIPASE SERPL-CCNC: 51 U/L (ref 7–60)
LYMPHOCYTES # BLD AUTO: 3002 CELLS/UL (ref 850–3900)
LYMPHOCYTES NFR BLD AUTO: 34.5 %
MCH RBC QN AUTO: 28 PG (ref 27–33)
MCHC RBC AUTO-ENTMCNC: 32.5 G/DL (ref 32–36)
MCV RBC AUTO: 86.2 FL (ref 80–100)
MONOCYTES # BLD AUTO: 670 CELLS/UL (ref 200–950)
MONOCYTES NFR BLD AUTO: 7.7 %
NEUTROPHILS # BLD AUTO: 4829 CELLS/UL (ref 1500–7800)
NEUTROPHILS NFR BLD AUTO: 55.5 %
NITRITE UR QL STRIP: NEGATIVE
PH UR STRIP: 5.5 [PH] (ref 5–8)
PLATELET # BLD AUTO: 286 THOUSAND/UL (ref 140–400)
PMV BLD REES-ECKER: 11.4 FL (ref 7.5–12.5)
POTASSIUM SERPL-SCNC: 4.1 MMOL/L (ref 3.5–5.3)
PROT SERPL-MCNC: 7.4 G/DL (ref 6.1–8.1)
PROT UR QL STRIP: NEGATIVE
RBC # BLD AUTO: 4.93 MILLION/UL (ref 3.8–5.1)
RBC #/AREA URNS HPF: ABNORMAL /HPF
SERVICE CMNT-IMP: ABNORMAL
SL AMB EGFR AFRICAN AMERICAN: 91 ML/MIN/1.73M2
SL AMB EGFR NON AFRICAN AMERICAN: 79 ML/MIN/1.73M2
SODIUM SERPL-SCNC: 139 MMOL/L (ref 135–146)
SP GR UR STRIP: 1.03 (ref 1–1.03)
SQUAMOUS #/AREA URNS HPF: ABNORMAL /HPF
TESTOST SERPL-MCNC: 69 NG/DL (ref 2–45)
TSH SERPL-ACNC: 2.83 MIU/L
VIT B12 SERPL-MCNC: 933 PG/ML (ref 200–1100)
WBC # BLD AUTO: 8.7 THOUSAND/UL (ref 3.8–10.8)
WBC #/AREA URNS HPF: ABNORMAL /HPF

## 2019-09-12 PROCEDURE — 97112 NEUROMUSCULAR REEDUCATION: CPT | Performed by: PHYSICAL THERAPIST

## 2019-09-12 PROCEDURE — 97140 MANUAL THERAPY 1/> REGIONS: CPT | Performed by: PHYSICAL THERAPIST

## 2019-09-12 PROCEDURE — 97110 THERAPEUTIC EXERCISES: CPT | Performed by: PHYSICAL THERAPIST

## 2019-09-12 NOTE — PROGRESS NOTES
Daily Note     Today's date: 2019  Patient name: Francoise Miller  : 1985  MRN: [id number]  Referring provider: Merlin Hails, DO  Dx:   Encounter Diagnosis     ICD-10-CM    1  Pelvic floor dysfunction in female M62 89                   Subjective: Pt c/o left glut pain and mid back pain lately  Pt notes performing stretches, but not consistent with core exercises  Objective: See treatment diary below      Assessment: Pt continues to require cues to active TA and maintain neutral spine during DLS: rose  Instructed pt to focus on abdominal bracing and DLS: KFO daily to focus on core stability  Patient demonstrated fatigue post treatment      Plan: Continue per plan of care        Precautions: PCOS  Focus on core strength, PF, and hip ROM    Daily Treatment Diary     Manual         PFM STM [address] MONET           PA lumbar mobs prone     MONET        Sacral stretch in child's pose             TPR to glut/IO   [address]       Hip PROM Hip MET  [address]               Exercise Diary         Abdominal brace   5"x10 5"x10 5"x10 5"x10       SKC   10"x5 ea  10"x5 ea        Piriformis stretch   30"x3 ea 30"x3 ea 30"x3  ea 30"x3 ea       TB Ext             TB rows             ITB stretch supine             Seated Butterfly stretch             Hip Add Iso    5"x10         TA+PF lift+hip add             DLS: KFO    6"K74  0"L95       DLS: Marching   2"I61 ea 5"x10 ea 5"x10 ea        Hip Abd s/l   2x10 ea  2x10 ea        S/l clamshells   5"x10 5"x10 ea 5"x20 ea        Stool Deep Squat stretch             Quadruped leg ext             Sit to stand             Mini squats             Step Ups             Bicep Curls/OH lift             Shoulder Flexion/Abd AROM             Open book stretch LTR  10"x10  lumbar rot in s/l  30"x3 ea              Modalities

## 2019-09-26 ENCOUNTER — APPOINTMENT (OUTPATIENT)
Dept: PHYSICAL THERAPY | Facility: CLINIC | Age: 34
End: 2019-09-26
Payer: COMMERCIAL

## 2019-09-30 ENCOUNTER — EVALUATION (OUTPATIENT)
Dept: PHYSICAL THERAPY | Facility: CLINIC | Age: 34
End: 2019-09-30
Payer: COMMERCIAL

## 2019-09-30 ENCOUNTER — TRANSCRIBE ORDERS (OUTPATIENT)
Dept: PHYSICAL THERAPY | Facility: CLINIC | Age: 34
End: 2019-09-30

## 2019-09-30 DIAGNOSIS — M62.89 PELVIC FLOOR DYSFUNCTION IN FEMALE: Primary | ICD-10-CM

## 2019-09-30 DIAGNOSIS — M62.89 MYOFIBROSIS: Primary | ICD-10-CM

## 2019-09-30 PROCEDURE — G8990 OTHER PT/OT CURRENT STATUS: HCPCS | Performed by: PHYSICAL THERAPIST

## 2019-09-30 PROCEDURE — 97110 THERAPEUTIC EXERCISES: CPT | Performed by: PHYSICAL THERAPIST

## 2019-09-30 PROCEDURE — 97140 MANUAL THERAPY 1/> REGIONS: CPT | Performed by: PHYSICAL THERAPIST

## 2019-09-30 PROCEDURE — G8991 OTHER PT/OT GOAL STATUS: HCPCS | Performed by: PHYSICAL THERAPIST

## 2019-09-30 PROCEDURE — 97112 NEUROMUSCULAR REEDUCATION: CPT | Performed by: PHYSICAL THERAPIST

## 2019-09-30 NOTE — LETTER
2019    Hong Nunez [address]    Patient: Micky Duran   YOB: 1985   Date of Visit: 2019     Encounter Diagnosis     ICD-10-CM    1  Pelvic floor dysfunction in female M64 80        Dear Dr David Aguila:    Thank you for your recent referral of Micky Duran  Please review the attached evaluation summary from Laurcb's recent visit  Please verify that you agree with the plan of care by signing the attached order  If you have any questions or concerns, please do not hesitate to call  I sincerely appreciate the opportunity to share in the care of one of your patients and hope to have another opportunity to work with you in the near future  Sincerely,    Melia Sherri, PT      Referring Provider:      I certify that I have read the below Plan of Care and certify the need for these services furnished under this plan of treatment while under my care  Pa Prasad DO  [address]: [phone]          PT Re-Evaluation     Today's date: 2019  Patient name: Micky Duran  : 1985  MRN: [id number]  Referring provider: Karla Snyder DO  Dx:   Encounter Diagnosis     ICD-10-CM    1  Pelvic floor dysfunction in female M62 89                   Assessment  Assessment details: Patient report 40% improvement , pt notes less pain with prolong sitting in the car and is able to kneel in [employer] with less pain and difficulty  Pt still notes instability in core with carrying her baby and ADLs  Pt notes she feels overwhelmed with level of exercises she must perform to address level of weakness in her body  Demonstrates increase in lower extremity strength, but persistent weakness in PFM with delayed and incomplete relaxation   Patient would benefit from further skilled pelvic rehab to decrease pain and maximize functional capacity to improve quality of life   Impairments: abnormal muscle tone, abnormal or restricted ROM, impaired physical strength, lacks appropriate home exercise program, pain with function and poor posture     Goals  Impairment Goals in 8 weeks:  1  Improve MMT for pelvic floor to greater than or equal to 2+/5 in order to improve lumbopelvic stability  2  Increase hip strength to 4+/5 in order to maximize hip and lumbopelvic stability during all functional activities and improve pelvic floor functional abilities  3  Decrease PFM muscle tension to minimal tenderness  Functional Goals in 8 weeks:  1  Patient is able to ambulate community distances pushing stroller without pelvic pain or right knee pain - partially met  2  Patient is able to sit on firm chair without pelvic pain - met  3  Patient will report 75% reduction in discomfort with either palpation or intimacy in order to tolerate gynecological examination or intimacy and improve quality of life - partially met  Added 7/29/19: 4  Patient is able to kneel and bathe her baby in bathtub without back pain - partially met    Plan  Patient would benefit from: skilled physical therapy  Planned modality interventions: biofeedback  Planned therapy interventions: manual therapy, neuromuscular re-education, patient education, self care, strengthening, stretching, home exercise program and behavior modification  Frequency: 1x week  Duration in weeks: 8  Plan of Care expiration date: 11/25/2019  Treatment plan discussed with: patient        Subjective Evaluation    History of Present Illness  Mechanism of injury: Bladder Function:     Voiding Difficulties: complete emptying      Voiding frequency: every 2 hours    Urinary leakage: no urine leakage    Urinary leakage aggravated by: less post-void dribble    Nocturia (episodes per night):  2    Bowel Function:     Voiding Difficulties: none    Bowel frequency: daily (once)    Stool softener use: no stool softeners    Sexual Function:    Pt reports avoiding intercourse lately due to feeling fatigued and pelvic discomfort  Pain  Current pain ratin  At best pain ratin  At worst pain ratin  Location: Left pelvic Pain/Hip Adductor  Quality: dull ache    Patient Goals  Patient goals for therapy: decreased pain, increased strength, independence with ADLs/IADLs and return to work          Objective     Active Range of Motion     Lumbar   Flexion: 50 (left glut pain) degrees   Extension: 15 (thoracic pain) degrees   Left lateral flexion: 5 degrees       Right lateral flexion: 5 degrees   Left rotation:  Restriction level: minimal  Right rotation:  Restriction level: minimal    Strength/Myotome Testing     Left Hip   Planes of Motion   Flexion: 4  Extension: 4  Abduction: 4  Adduction: 4  External rotation: 4+  Internal rotation: 4    Right Hip   Planes of Motion   Flexion: 4+  Extension: 4-  Abduction: 4  Adduction: 4  External rotation: 4+  Internal rotation: 4+    Left Knee   Flexion: 4+  Extension: 4+    Right Knee   Flexion: 4+  Extension: 4+    Left Ankle/Foot   Dorsiflexion: 4    Right Ankle/Foot   Dorsiflexion: 4    Additional Strength Details  Prone knee flexion: 130* b/l (Lower back pain with RLE)    Palpation: severe ttp along coccyx, gluts, L4-S3  Pelvic Floor Exam   Position: supine exam    Diastatis   Diastasis recti present? no  3" above umbilicus (# fingers): 0  Umbilicus (# fingers): 1  3" below umbilicus (# fingers): 0  Connective tissue integrity at linea alba: firm  no tenderness at linea alba  able to engage transverse abdominis     General Perineum Exam:   Positive for swelling, no pelvic organ prolapse at rest and perianal erythema       Visual Inspection of Perineum:   Excursion of perineal body in cephalad direction with contraction of pelvic floor muscles (PFM): weak  Excursion of perineal body in caudal direction with relaxation of pelvic floor muscles (PFM): delayed  and weak  Involuntary contraction with coughing: yes  Involuntary relaxation with bearing down: yes  Sensation: diminished    Pelvic Organ Prolapse   no pelvic organ prolapse  At rest: none  With bearing down: mild (>1cm from hymenal remnants)  Location: anterior    Pelvic Floor Muscle Exam     Palpation   Minimal increased muscle tension in the bulbospongiosus, ischiocavernosus, super transverse perineal and obturator internus  Moderate increased muscle tension in the puborectalis, pubococcygeus, iliococcygeus and coccygeus  No tenderness on right in the bulbospongiosus  Minimal tenderness on right in the ischiocavernosus, super transverse perineal and obturator internus  Moderate tenderness on right in the puborectalis, pubococcygeus, iliococcygeus and coccygeus  Minimal tenderness on left in the bulbospongiosus, ischiocavernosus, super transverse perineal and obturator internus  Moderate tenderness on left in the puborectalis, pubococcygeus, iliococcygeus and coccygeus    Muscle Contraction: well isolated  Breathing pattern with contraction: diaphragmatic  Pelvic floor muscle relaxation is delayed and incomplete  75% pelvic floor relaxation  3 seconds required for complete relaxation  PERFECT Score   Power right: 1+/5  Power left: 2/5  Endurance (seconds to max):  2  Repetitions (before fatigue): 5  Fast flicks (in 10 seconds): 3               Precautions: PCOS  Focus on core strength, PF, and hip ROM    Daily Treatment Diary     Manual  7/22 7/29 8/12 8/19 8/26 9/12 9/30      PFM STM [facility] [address]      PA lumbar mobs prone     MONET        Sacral stretch in child's pose             TPR to glut/IO   [address]       Hip PROM Hip MET  [address]               Exercise Diary  7/22 7/29 8/12 8/19 8/26 9/12 9/30      Abdominal brace   5"x10 5"x10 5"x10 5"x10       SKC   10"x5 ea  10"x5 ea        Piriformis stretch   30"x3 ea 30"x3 ea 30"x3  ea 30"x3 ea       TB Ext             TB rows             ITB stretch supine             Seated Butterfly stretch             Hip Add Iso    5"x10         TA+PF lift+hip add             DLS: KFO    4"V51  1"G28       DLS: Marching   9"B05 ea 5"x10 ea 5"x10 ea        Hip Abd s/l   2x10 ea  2x10 ea        S/l clamshells   5"x10 5"x10 ea 5"x20 ea        Stool Deep Squat stretch             Quadruped leg ext             Sit to stand             Mini squats             Step Ups             Bicep Curls/OH lift             Shoulder Flexion/Abd AROM             Open book stretch LTR  10"x10  lumbar rot in s/l  30"x3 ea              Modalities

## 2019-09-30 NOTE — PROGRESS NOTES
PT Re-Evaluation     Today's date: 2019  Patient name: Freda Manning  : 1985  MRN: [id number]  Referring provider: Josefa Mccurdy DO  Dx:   Encounter Diagnosis     ICD-10-CM    1  Pelvic floor dysfunction in female M62 89                   Assessment  Assessment details: Patient report 40% improvement , pt notes less pain with prolong sitting in the car and is able to kneel in [employer] with less pain and difficulty  Pt still notes instability in core with carrying her baby and ADLs  Pt notes she feels overwhelmed with level of exercises she must perform to address level of weakness in her body  Demonstrates increase in lower extremity strength, but persistent weakness in PFM with delayed and incomplete relaxation  Patient would benefit from further skilled pelvic rehab to decrease pain and maximize functional capacity to improve quality of life  Impairments: abnormal muscle tone, abnormal or restricted ROM, impaired physical strength, lacks appropriate home exercise program, pain with function and poor posture     Goals  Impairment Goals in 8 weeks:  1  Improve MMT for pelvic floor to greater than or equal to 2+/5 in order to improve lumbopelvic stability  2  Increase hip strength to 4+/5 in order to maximize hip and lumbopelvic stability during all functional activities and improve pelvic floor functional abilities  3  Decrease PFM muscle tension to minimal tenderness  Functional Goals in 8 weeks:  1  Patient is able to ambulate community distances pushing stroller without pelvic pain or right knee pain - partially met  2  Patient is able to sit on firm chair without pelvic pain - met  3  Patient will report 75% reduction in discomfort with either palpation or intimacy in order to tolerate gynecological examination or intimacy and improve quality of life - partially met  Added 19: 4   Patient is able to kneel and bathe her baby in bathtub without back pain - partially met    Plan  Patient would benefit from: skilled physical therapy  Planned modality interventions: biofeedback  Planned therapy interventions: manual therapy, neuromuscular re-education, patient education, self care, strengthening, stretching, home exercise program and behavior modification  Frequency: 1x week  Duration in weeks: 8  Plan of Care expiration date: 2019  Treatment plan discussed with: patient        Subjective Evaluation    History of Present Illness  Mechanism of injury: Bladder Function:     Voiding Difficulties: complete emptying      Voiding frequency: every 2 hours    Urinary leakage: no urine leakage    Urinary leakage aggravated by: less post-void dribble    Nocturia (episodes per night): 2    Bowel Function:     Voiding Difficulties: none    Bowel frequency: daily (once)    Stool softener use: no stool softeners    Sexual Function:    Pt reports avoiding intercourse lately due to feeling fatigued and pelvic discomfort    Pain  Current pain ratin  At best pain ratin  At worst pain ratin  Location: Left pelvic Pain/Hip Adductor  Quality: dull ache    Patient Goals  Patient goals for therapy: decreased pain, increased strength, independence with ADLs/IADLs and return to work          Objective     Active Range of Motion     Lumbar   Flexion: 50 (left glut pain) degrees   Extension: 15 (thoracic pain) degrees   Left lateral flexion: 5 degrees       Right lateral flexion: 5 degrees   Left rotation:  Restriction level: minimal  Right rotation:  Restriction level: minimal    Strength/Myotome Testing     Left Hip   Planes of Motion   Flexion: 4  Extension: 4  Abduction: 4  Adduction: 4  External rotation: 4+  Internal rotation: 4    Right Hip   Planes of Motion   Flexion: 4+  Extension: 4-  Abduction: 4  Adduction: 4  External rotation: 4+  Internal rotation: 4+    Left Knee   Flexion: 4+  Extension: 4+    Right Knee   Flexion: 4+  Extension: 4+    Left Ankle/Foot   Dorsiflexion: 4    Right Ankle/Foot Dorsiflexion: 4    Additional Strength Details  Prone knee flexion: 130* b/l (Lower back pain with RLE)    Palpation: severe ttp along coccyx, gluts, L4-S3  Pelvic Floor Exam   Position: supine exam    Diastatis   Diastasis recti present? no  3" above umbilicus (# fingers): 0  Umbilicus (# fingers): 1  3" below umbilicus (# fingers): 0  Connective tissue integrity at linea alba: firm  no tenderness at linea alba  able to engage transverse abdominis     General Perineum Exam:   Positive for swelling, no pelvic organ prolapse at rest and perianal erythema  Visual Inspection of Perineum:   Excursion of perineal body in cephalad direction with contraction of pelvic floor muscles (PFM): weak  Excursion of perineal body in caudal direction with relaxation of pelvic floor muscles (PFM): delayed  and weak  Involuntary contraction with coughing: yes  Involuntary relaxation with bearing down: yes  Sensation: diminished    Pelvic Organ Prolapse   no pelvic organ prolapse  At rest: none  With bearing down: mild (>1cm from hymenal remnants)  Location: anterior    Pelvic Floor Muscle Exam     Palpation   Minimal increased muscle tension in the bulbospongiosus, ischiocavernosus, super transverse perineal and obturator internus  Moderate increased muscle tension in the puborectalis, pubococcygeus, iliococcygeus and coccygeus  No tenderness on right in the bulbospongiosus  Minimal tenderness on right in the ischiocavernosus, super transverse perineal and obturator internus  Moderate tenderness on right in the puborectalis, pubococcygeus, iliococcygeus and coccygeus  Minimal tenderness on left in the bulbospongiosus, ischiocavernosus, super transverse perineal and obturator internus  Moderate tenderness on left in the puborectalis, pubococcygeus, iliococcygeus and coccygeus    Muscle Contraction: well isolated  Breathing pattern with contraction: diaphragmatic  Pelvic floor muscle relaxation is delayed and incomplete     75% pelvic floor relaxation  3 seconds required for complete relaxation  PERFECT Score   Power right: 1+/5  Power left: 2/5  Endurance (seconds to max):  2  Repetitions (before fatigue): 5  Fast flicks (in 10 seconds): 3               Precautions: PCOS  Focus on core strength, PF, and hip ROM    Daily Treatment Diary     Manual  7/22 7/29 8/12 8/19 8/26 9/12 9/30      PFM STM [address]      PA lumbar mobs prone     MONET        Sacral stretch in child's pose             TPR to glut/IO   [address]       Hip PROM Hip MET  [address]               Exercise Diary  7/22 7/29 8/12 8/19 8/26 9/12 9/30      Abdominal brace   5"x10 5"x10 5"x10 5"x10       SKC   10"x5 ea  10"x5 ea        Piriformis stretch   30"x3 ea 30"x3 ea 30"x3  ea 30"x3 ea       TB Ext             TB rows             ITB stretch supine             Seated Butterfly stretch             Hip Add Iso    5"x10         TA+PF lift+hip add             DLS: KFO    8"I26  4"Z77       DLS: Marching   6"K59 ea 5"x10 ea 5"x10 ea        Hip Abd s/l   2x10 ea  2x10 ea        S/l clamshells   5"x10 5"x10 ea 5"x20 ea        Stool Deep Squat stretch             Quadruped leg ext             Sit to stand             Mini squats             Step Ups             Bicep Curls/OH lift             Shoulder Flexion/Abd AROM             Open book stretch LTR  10"x10  lumbar rot in s/l  30"x3 ea              Modalities

## 2019-10-07 ENCOUNTER — OFFICE VISIT (OUTPATIENT)
Dept: PHYSICAL THERAPY | Facility: CLINIC | Age: 34
End: 2019-10-07
Payer: COMMERCIAL

## 2019-10-07 DIAGNOSIS — M62.89 PELVIC FLOOR DYSFUNCTION IN FEMALE: Primary | ICD-10-CM

## 2019-10-07 PROCEDURE — 97140 MANUAL THERAPY 1/> REGIONS: CPT | Performed by: PHYSICAL THERAPIST

## 2019-10-07 PROCEDURE — 97112 NEUROMUSCULAR REEDUCATION: CPT | Performed by: PHYSICAL THERAPIST

## 2019-10-07 NOTE — PROGRESS NOTES
Daily Note     Today's date: 10/7/2019  Patient name: Lyndon Rodriguez  : 1985  MRN: [id number]  Referring provider: Paula Diaz DO  Dx:   Encounter Diagnosis     ICD-10-CM    1  Pelvic floor dysfunction in female M62 89                   Subjective: Pt reports working on deep breathing but still notes challenge at times  Objective: See treatment diary below      Assessment: Demonstrates relaxation of PFM 50% taking 4 seconds following the first two kegels with 2 second hold, but demonstrated quick fatigue with minimal relaxation by 3rd rep, requiring deep breathing and manual STM of PFM  Instructed pt to perform 1 PFM lift followed by two diaphragmatic breathing, then alicia-cross PFM stretch for 30"x1 ea and repeat cycle 5 times for HEP  Demonstrates 50% relaxation of PFM following alicia-cross stretch  Discussed possibility of investing in a pelvic wand for self-assessed STM  Patient would benefit from continued PT      Plan: Continue per plan of care        Precautions: PCOS  Focus on core strength, PF, and hip ROM    Daily Treatment Diary     Manual  7/22 7/29 8/12 8/19 8/26 9/12 9/30 10/7     PFM STM [address]     PA lumbar mobs prone     [address]        Sacral stretch in child's pose             TPR to glut/IO   [address]       Hip PROM Hip MET  [address]               Exercise Diary  7/22 7/29 8/12 8/19 8/26 9/12 9/30 10/7     Abdominal brace   5"x10 5"x10 5"x10 5"x10  PF lift  2"x5     SKC   10"x5 ea  10"x5 ea   diaphragmatic breathing  2x5     Piriformis stretch   30"x3 ea 30"x3 ea 30"x3  ea 30"x3 ea  Alicia-cross stretch  30"x1 ea     TB Ext             TB rows             ITB stretch supine             Seated Butterfly stretch             Hip Add Iso    5"x10         TA+PF lift+hip add             DLS: KFO    6"I80  2"E00       DLS: Marching   8"V09 ea 5"x10 ea 5"x10 ea        Hip Abd s/l   2x10 ea  2x10 ea        S/l clamshells   5"x10 5"x10 ea 5"x20 ea        Stool Deep Squat stretch Quadruped leg ext             Sit to stand             Mini squats             Step Ups             Bicep Curls/OH lift             Shoulder Flexion/Abd AROM             Open book stretch LTR  10"x10  lumbar rot in s/l  30"x3 ea              Modalities

## 2019-10-10 ENCOUNTER — IMMUNIZATIONS (OUTPATIENT)
Dept: FAMILY MEDICINE CLINIC | Facility: CLINIC | Age: 34
End: 2019-10-10
Payer: COMMERCIAL

## 2019-10-10 DIAGNOSIS — Z23 ENCOUNTER FOR IMMUNIZATION: ICD-10-CM

## 2019-10-10 PROCEDURE — 90471 IMMUNIZATION ADMIN: CPT

## 2019-10-10 PROCEDURE — 90686 IIV4 VACC NO PRSV 0.5 ML IM: CPT

## 2019-10-14 ENCOUNTER — OFFICE VISIT (OUTPATIENT)
Dept: PHYSICAL THERAPY | Facility: CLINIC | Age: 34
End: 2019-10-14
Payer: COMMERCIAL

## 2019-10-14 DIAGNOSIS — M62.89 PELVIC FLOOR DYSFUNCTION IN FEMALE: Primary | ICD-10-CM

## 2019-10-14 PROCEDURE — 97112 NEUROMUSCULAR REEDUCATION: CPT | Performed by: PHYSICAL THERAPIST

## 2019-10-14 PROCEDURE — 97140 MANUAL THERAPY 1/> REGIONS: CPT | Performed by: PHYSICAL THERAPIST

## 2019-10-14 NOTE — PROGRESS NOTES
Daily Note     Today's date: 10/14/2019  Patient name: Rosa Clarke  : 1985  MRN: [id number]  Referring provider: Goran Bunn DO  Dx:   Encounter Diagnosis     ICD-10-CM    1  Pelvic floor dysfunction in female M62 89                   Subjective: Patient reports no new complaints, notes focusing on diaphragmatic breathing  Objective: See treatment diary below      Assessment: Demonstrates 25% relaxation of PF following PF lift, but continues to require two deep diaphragmatic breaths to relax PF to near 100%  Plan to progress with PF/core exercises next visit as tolerated  Patient would benefit from continued PT      Plan: Continue per plan of care        Precautions: PCOS  Focus on core strength, PF, and hip ROM    Daily Treatment Diary     Manual  7/22 7/29 8/12 8/19 8/26 9/12 9/30 10/7 10/14    PFM STM [address]    PA lumbar mobs prone     [address]        Sacral stretch in child's pose             TPR to glut/IO   [address]       Hip PROM Hip MET  [address]               Exercise Diary  7/22 7/29 8/12 8/19 8/26 9/12 9/30 10/7 10/14    Abdominal brace   5"x10 5"x10 5"x10 5"x10  PF lift  2"x5 2"x5    SKC   10"x5 ea  10"x5 ea   diaphragmatic breathing  2x5     Piriformis stretch   30"x3 ea 30"x3 ea 30"x3  ea 30"x3 ea  Fiorella-cross stretch  30"x1 ea 30"x3 ea    PF Lift         2"x8    TB rows             ITB stretch supine             Seated Butterfly stretch             Hip Add Iso    5"x10         TA+PF lift+hip add             DLS: KFO    2"S88  1"J94       DLS: Marching   6"O46 ea 5"x10 ea 5"x10 ea        Hip Abd s/l   2x10 ea  2x10 ea        S/l clamshells   5"x10 5"x10 ea 5"x20 ea        Stool Deep Squat stretch             Quadruped leg ext             Sit to stand             Mini squats             Step Ups             Bicep Curls/OH lift             Shoulder Flexion/Abd AROM             Open book stretch LTR  10"x10  lumbar rot in s/l  30"x3 ea              Modalities

## 2019-10-21 ENCOUNTER — OFFICE VISIT (OUTPATIENT)
Dept: PHYSICAL THERAPY | Facility: CLINIC | Age: 34
End: 2019-10-21
Payer: COMMERCIAL

## 2019-10-21 DIAGNOSIS — M62.89 PELVIC FLOOR DYSFUNCTION IN FEMALE: Primary | ICD-10-CM

## 2019-10-21 PROCEDURE — 97140 MANUAL THERAPY 1/> REGIONS: CPT | Performed by: PHYSICAL THERAPIST

## 2019-10-21 PROCEDURE — 97112 NEUROMUSCULAR REEDUCATION: CPT | Performed by: PHYSICAL THERAPIST

## 2019-10-21 NOTE — PROGRESS NOTES
Daily Note     Today's date: 10/21/2019  Patient name: Nikolas Daily  : 1985  MRN: [id number]  Referring provider: Demarco Monteiro DO  Dx:   Encounter Diagnosis     ICD-10-CM    1  Pelvic floor dysfunction in female M62 89                   Subjective: Pt reports focusing on kegels and stretches at home and notes overall less back pain with caring for her son  Objective: See treatment diary below      Assessment: Demonstrates less muscle tension in PFM during manuals and demonstrate 90% relaxation of PFM in 4 seconds following 3 second PFM contraction  Obtained full PFM relaxation following diaphragmatic breathing  Pt required cues to activate TA and PFM during BKFO  Patient would benefit from continued PT      Plan: Continue per plan of care        Precautions: PCOS  Focus on core strength, PF, and hip ROM    Daily Treatment Diary     Manual  7/22 7/29 8/12 8/19 8/26 9/12 9/30 10/7 10/14 10/21   PFM STM [address] [address] [address]   PA lumbar mobs prone     [address]        Sacral stretch in child's pose             TPR to glut/IO   [address]       Hip PROM Hip MET  [address]               Exercise Diary  7/22 7/29 8/12 8/19 8/26 9/12 9/30 10/7 10/14 10/21   Abdominal brace   5"x10 5"x10 5"x10 5"x10  PF lift  2"x5 2"x5 3"x5   SKC   10"x5 ea  10"x5 ea   diaphragmatic breathing  2x5     Piriformis stretch   30"x3 ea 30"x3 ea 30"x3  ea 30"x3 ea  Fiorella-cross stretch  30"x1 ea 30"x3 ea    PF Lift         2"x8 3"x5  5"x1   TB rows             ITB stretch supine             Seated Butterfly stretch             Hip Add Iso    5"x10         TA+PF lift+hip add             DLS: KFO    2"U51  7"V98    5"x10   DLS: Marching   2"Q71 ea 5"x10 ea 5"x10 ea        Hip Abd s/l   2x10 ea  2x10 ea        S/l clamshells   5"x10 5"x10 ea 5"x20 ea        Stool Deep Squat stretch             Quadruped leg ext             Sit to stand             Mini squats             Step Ups             Bicep Curls/OH lift             Shoulder Flexion/Abd AROM             Open book stretch LTR  10"x10  lumbar rot in s/l  30"x3 ea              Modalities

## 2019-10-28 ENCOUNTER — OFFICE VISIT (OUTPATIENT)
Dept: PHYSICAL THERAPY | Facility: CLINIC | Age: 34
End: 2019-10-28
Payer: COMMERCIAL

## 2019-10-28 DIAGNOSIS — M62.89 PELVIC FLOOR DYSFUNCTION IN FEMALE: Primary | ICD-10-CM

## 2019-10-28 PROCEDURE — 97112 NEUROMUSCULAR REEDUCATION: CPT | Performed by: PHYSICAL THERAPIST

## 2019-10-28 PROCEDURE — 97140 MANUAL THERAPY 1/> REGIONS: CPT | Performed by: PHYSICAL THERAPIST

## 2019-10-28 NOTE — PROGRESS NOTES
Daily Note     Today's date: 10/28/2019  Patient name: Carola Mondragon  : 1985  MRN: [id number]  Referring provider: Jaquan Plascencia DO  Dx:   Encounter Diagnosis     ICD-10-CM    1  Pelvic floor dysfunction in female M62 89                   Subjective: Pt reports ordering dilator and wand to help with home PFM stretches as instructed  Objective: See treatment diary below      Assessment: Demonstrates less soft tissue adhesions along opening of vaginal canal during STM today  Demonstrates 3+/5 PFM strength, but fatigue noted around 3 seconds, and requiring 3-5 seconds to relax PFM with diaphragmatic breathing to complete relaxation  Patient would benefit from continued PT      Plan: Continue per plan of care        Precautions: PCOS  Focus on core strength, PF, and hip ROM    Daily Treatment Diary     Manual  10/28            PFM STM MONET            PA lumbar mobs prone             Sacral stretch in child's pose             TPR to glut/IO             Hip PROM                 Exercise Diary  10/28            Abdominal brace             SKC             Piriformis stretch             PF Lift 3"x5            TB rows             ITB stretch supine             Seated Butterfly stretch             Hip Add Iso             TA+PF lift+hip add             DLS: KFO 1"L81            DLS: Marching 2"P24            Hip Abd s/l             S/l clamshells             Stool Deep Squat stretch             Quadruped leg ext             Sit to stand with TB hip abd nv            Mini squats             Step Ups             Bicep Curls/OH lift             Shoulder Flexion/Abd AROM             Open book stretch                 Modalities

## 2019-10-30 ENCOUNTER — OFFICE VISIT (OUTPATIENT)
Dept: FAMILY MEDICINE CLINIC | Facility: CLINIC | Age: 34
End: 2019-10-30
Payer: COMMERCIAL

## 2019-10-30 VITALS
TEMPERATURE: 99.2 F | BODY MASS INDEX: 30.15 KG/M2 | HEART RATE: 96 BPM | WEIGHT: 176.6 LBS | OXYGEN SATURATION: 97 % | HEIGHT: 64 IN | DIASTOLIC BLOOD PRESSURE: 78 MMHG | SYSTOLIC BLOOD PRESSURE: 120 MMHG

## 2019-10-30 DIAGNOSIS — R53.82 CHRONIC FATIGUE: ICD-10-CM

## 2019-10-30 DIAGNOSIS — E55.9 VITAMIN D DEFICIENCY: ICD-10-CM

## 2019-10-30 DIAGNOSIS — M62.89 PELVIC FLOOR DYSFUNCTION: ICD-10-CM

## 2019-10-30 DIAGNOSIS — Z13.6 SCREENING FOR CARDIOVASCULAR CONDITION: ICD-10-CM

## 2019-10-30 DIAGNOSIS — E28.2 POLYCYSTIC OVARIAN SYNDROME: Primary | ICD-10-CM

## 2019-10-30 PROCEDURE — 99214 OFFICE O/P EST MOD 30 MIN: CPT | Performed by: FAMILY MEDICINE

## 2019-10-30 NOTE — PROGRESS NOTES
Assessment/Plan:     Diagnoses and all orders for this visit:    Polycystic ovarian syndrome  -     Testosterone, free, total; Future  -     Magnesium; Future    Vitamin D deficiency  -     Vitamin D 1,25 dihydroxy; Future  -     Magnesium; Future    Chronic fatigue  -     Vitamin B12; Future  -     Iron; Future  -     Comprehensive metabolic panel; Future  -     C-reactive protein; Future  -     Magnesium; Future    Pelvic floor dysfunction    Screening for cardiovascular condition  -     Lipid panel; Future      patient will continue follow-up with her endocrinologist  She is continuing physical therapy for pelvic floor dysfunction  Will repeat some blood work for her chronic fatigue issues  She follow-up in 6 months or sooner if needed      Subjective:     Chief Complaint   Patient presents with    Follow-up     Discuss ordering blood work        Patient ID: Lyndon Rodriguez is a 29 y o  female  Patient is here today for checkup chronic conditions  She states that she is still doing physical therapy for her pelvic floor issues to after childbirth  She still has significant chronic fatigue  She is following with Endocrinology for polycystic ovarian syndrome  Reviewed her blood work  Id significantly improved from the previous  But some test were not quite normal yet  She has no other complaints today      The following portions of the patient's history were reviewed and updated as appropriate: allergies, current medications, past family history, past medical history, past social history, past surgical history and problem list     Review of Systems   Constitutional: Positive for fatigue  HENT: Negative  Eyes: Negative  Respiratory: Negative  Cardiovascular: Negative  Gastrointestinal: Negative  Endocrine: Negative  Genitourinary: Negative  Musculoskeletal: Negative  Skin: Negative  Allergic/Immunologic: Negative  Neurological: Negative  Hematological: Negative  Psychiatric/Behavioral: Negative  All other systems reviewed and are negative  Objective:    Vitals:    10/30/19 0824   BP: 120/78   BP Location: Left arm   Patient Position: Sitting   Cuff Size: Standard   Pulse: 96   Temp: 99 2 °F (37 3 °C)   TempSrc: Tympanic   SpO2: 97%   Weight: 80 1 kg (176 lb 9 6 oz)   Height: 5' 3 75" (1 619 m)          Physical Exam   Constitutional: She is oriented to person, place, and time  She appears well-developed and well-nourished  HENT:   Head: Normocephalic and atraumatic  Right Ear: External ear normal    Left Ear: External ear normal    Mouth/Throat: Oropharynx is clear and moist    Eyes: Pupils are equal, round, and reactive to light  Conjunctivae and EOM are normal    Neck: Normal range of motion  Cardiovascular: Normal rate, regular rhythm and normal heart sounds  Pulmonary/Chest: Effort normal and breath sounds normal    Abdominal: Soft  Bowel sounds are normal    Musculoskeletal: Normal range of motion  Neurological: She is alert and oriented to person, place, and time  She has normal reflexes  Skin: Skin is warm and dry  Psychiatric: She has a normal mood and affect  Her behavior is normal  Judgment and thought content normal    Nursing note and vitals reviewed  BMI Counseling: Body mass index is 30 55 kg/m²  The BMI is above normal  Nutrition recommendations include reducing portion sizes, decreasing overall calorie intake, 3-5 servings of fruits/vegetables daily, reducing fast food intake, consuming healthier snacks, decreasing soda and/or juice intake, moderation in carbohydrate intake, increasing intake of lean protein, reducing intake of saturated fat and trans fat and reducing intake of cholesterol  Exercise recommendations include exercising 3-5 times per week

## 2019-11-04 ENCOUNTER — OFFICE VISIT (OUTPATIENT)
Dept: PHYSICAL THERAPY | Facility: CLINIC | Age: 34
End: 2019-11-04
Payer: COMMERCIAL

## 2019-11-04 DIAGNOSIS — M62.89 PELVIC FLOOR DYSFUNCTION IN FEMALE: Primary | ICD-10-CM

## 2019-11-04 PROCEDURE — 97110 THERAPEUTIC EXERCISES: CPT | Performed by: PHYSICAL THERAPIST

## 2019-11-04 PROCEDURE — 97140 MANUAL THERAPY 1/> REGIONS: CPT | Performed by: PHYSICAL THERAPIST

## 2019-11-04 NOTE — PROGRESS NOTES
Daily Note     Today's date: 2019  Patient name: Jennie Ortiz  : 1985  MRN: [id number]  Referring provider: Bryan Cordero DO  Dx:   Encounter Diagnosis     ICD-10-CM    1  Pelvic floor dysfunction in female M62 89                   Subjective: Pt still notes soreness in lower back at the end of the day and notes left side tail bone/glut pain at times  Objective: See treatment diary below      Assessment: Assessed leg length and SI dysfunction  Demonstrates mild hypomobility of left SI and L>R trendelenburg for glut med weakness  Attempted hip MET to correct leg length discrepancy, with mild improvement in left ASIS position  Reviewed HEP of hip abd s/l and clamshells and corrected technique  Patient would benefit from continued PT      Plan: Continue per plan of care           Precautions: PCOS  Focus on core strength, PF, and hip ROM    Daily Treatment Diary     Manual  10/28 11/4           PFM STM [address] MONET           PA lumbar mobs prone             Sacral stretch in child's pose  [address]           TPR to glut/IO             Hip PROM  Hip MET               Exercise Diary  10/28 11/4           Abdominal brace             SKC             Piriformis stretch             PF Lift 3"x5 3"x5           TB rows             ITB stretch supine             Seated Butterfly stretch             Hip Add Iso             TA+PF lift+hip add             DLS: KFO 6"P30            DLS: Marching 3"Q62            Hip Abd s/l  10x ea           S/l clamshells  5"x10 ea           Stool Deep Squat stretch             Quadruped leg ext             Sit to stand with TB hip abd nv            Mini squats             Step Ups             Bicep Curls/OH lift             Shoulder Flexion/Abd AROM             Open book stretch                 Modalities

## 2019-11-11 ENCOUNTER — OFFICE VISIT (OUTPATIENT)
Dept: PHYSICAL THERAPY | Facility: CLINIC | Age: 34
End: 2019-11-11
Payer: COMMERCIAL

## 2019-11-11 DIAGNOSIS — M62.89 PELVIC FLOOR DYSFUNCTION IN FEMALE: Primary | ICD-10-CM

## 2019-11-11 PROCEDURE — 97535 SELF CARE MNGMENT TRAINING: CPT | Performed by: PHYSICAL THERAPIST

## 2019-11-11 PROCEDURE — 97112 NEUROMUSCULAR REEDUCATION: CPT | Performed by: PHYSICAL THERAPIST

## 2019-11-11 PROCEDURE — 97140 MANUAL THERAPY 1/> REGIONS: CPT | Performed by: PHYSICAL THERAPIST

## 2019-11-11 NOTE — PROGRESS NOTES
Daily Note     Today's date: 2019  Patient name: Eleno Gallardo  : 1985  MRN: [id number]  Referring provider: Edith Smith DO  Dx:   Encounter Diagnosis     ICD-10-CM    1  Pelvic floor dysfunction in female M62 89                   Subjective: Pt reports she has a painful heavy menstrual cycle, but willing to perform internal manuals  Pt brought in vaginal dilators for instruction on home use  Objective: See treatment diary below      Assessment: Pt instructed on use of dilators to decrease soft tissue restrictions for greater comfort for vaginal exams and intimacy  Pt tolerated manuals with less muscle tension noted  Pt noted fatigue and challenge with hip abd in s/l  Patient would benefit from continued PT      Plan: Continue per plan of care        Precautions: PCOS  Focus on core strength, PF, and hip ROM    Daily Treatment Diary     Manual  10/28 11/4 11/11          PFM  [address]          PA lumbar mobs prone             Sacral stretch in child's pose  [address]           TPR to glut/IO             Hip PROM  Hip MET               Exercise Diary  10/28 11/4 11/11          Abdominal brace             SKC             Piriformis stretch             PF Lift 3"x5 3"x5           TB rows             ITB stretch supine             Seated Butterfly stretch             Hip Add Iso             TA+PF lift+hip add             DLS: KFO 0"J31            DLS: Marching 1"X39            Hip Abd s/l  10x ea 10x ea          S/l clamshells  5"x10 ea           Stool Deep Squat stretch             Quadruped leg ext             Sit to stand with TB hip abd nv  BlueTB  10x          Mini squats             Step Ups             Bicep Curls/OH lift             Shoulder Flexion/Abd AROM             Open book stretch                 Modalities

## 2019-11-18 ENCOUNTER — OFFICE VISIT (OUTPATIENT)
Dept: PHYSICAL THERAPY | Facility: CLINIC | Age: 34
End: 2019-11-18
Payer: COMMERCIAL

## 2019-11-18 DIAGNOSIS — M62.89 PELVIC FLOOR DYSFUNCTION IN FEMALE: Primary | ICD-10-CM

## 2019-11-18 PROCEDURE — 97112 NEUROMUSCULAR REEDUCATION: CPT | Performed by: PHYSICAL THERAPIST

## 2019-11-18 PROCEDURE — 97140 MANUAL THERAPY 1/> REGIONS: CPT | Performed by: PHYSICAL THERAPIST

## 2019-11-18 PROCEDURE — 97110 THERAPEUTIC EXERCISES: CPT | Performed by: PHYSICAL THERAPIST

## 2019-11-18 NOTE — PROGRESS NOTES
Daily Note     Today's date: 2019  Patient name: Billie Cook  : 1985  MRN: [id number]  Referring provider: Emmie Live DO  Dx:   Encounter Diagnosis     ICD-10-CM    1  Pelvic floor dysfunction in female M62 89                   Subjective: Pt recalls going to a football game over the weekend which involved sitting on bus for 1 hour both ways, sitting in football stadium, and prolong walk around the stadium  Pt notes minimal increase in tail bone pain following all the sitting, which is a great improved since postpartum  Objective: See treatment diary below      Assessment: Added s/l pelvic floor stretch with greater tightness noted on left compared to right  Pt noted relief in glut with stretch, but still notes PFM tail bone pain  Following alicia-cross stretch and happy baby stretch, pt noted decrease in tail bone region pain  Patient would benefit from continued PT      Plan: Continue per plan of care        Precautions: PCOS  Focus on core strength, PF, and hip ROM    Daily Treatment Diary     Manual  10/28 11/4 11/11 11/18         PFM [facility] MONET         S/l PF stretch    MONET         Sacral stretch in child's pose  [facility]         TPR to glut/IO             Hip PROM  Hip MET               Exercise Diary  10/28 11/4 11/11 11/18         Abdominal brace             SKC             Piriformis stretch             PF Lift 3"x5 3"x5           TB rows             ITB stretch supine             Seated Butterfly stretch             Hip Add Iso             TA+PF lift+hip add             DLS: KFO 5"C41            DLS: Marching 9"W57            Hip Abd s/l  10x ea 10x ea 10x         S/l clamshells  5"x10 ea           Stool Deep Squat stretch             Quadruped leg ext             Sit to stand with TB hip abd nv  BlueTB  10x blueTB  10x         Mini squats             Step Ups             Bicep Curls/OH lift             Shoulder Flexion/Abd AROM             Open book stretch Modalities

## 2019-11-25 ENCOUNTER — OFFICE VISIT (OUTPATIENT)
Dept: PHYSICAL THERAPY | Facility: CLINIC | Age: 34
End: 2019-11-25
Payer: COMMERCIAL

## 2019-11-25 ENCOUNTER — TRANSCRIBE ORDERS (OUTPATIENT)
Dept: PHYSICAL THERAPY | Facility: CLINIC | Age: 34
End: 2019-11-25

## 2019-11-25 DIAGNOSIS — M62.89 PELVIC FLOOR DYSFUNCTION IN FEMALE: Primary | ICD-10-CM

## 2019-11-25 DIAGNOSIS — M62.89 MYOFIBROSIS: Primary | ICD-10-CM

## 2019-11-25 PROCEDURE — 97140 MANUAL THERAPY 1/> REGIONS: CPT | Performed by: PHYSICAL THERAPIST

## 2019-11-25 PROCEDURE — 97110 THERAPEUTIC EXERCISES: CPT | Performed by: PHYSICAL THERAPIST

## 2019-11-25 PROCEDURE — 97112 NEUROMUSCULAR REEDUCATION: CPT | Performed by: PHYSICAL THERAPIST

## 2019-11-25 PROCEDURE — 97530 THERAPEUTIC ACTIVITIES: CPT | Performed by: PHYSICAL THERAPIST

## 2019-11-25 NOTE — LETTER
2019    Santo Barrera, [address]    Patient: Yudith Douglas   YOB: 1985   Date of Visit: 2019     Encounter Diagnosis     ICD-10-CM    1  Pelvic floor dysfunction in female M64 80        Dear Dr Kelsie Moyer:    Thank you for your recent referral of Yudith Douglas  Please review the attached evaluation summary from Marilu's recent visit  Please verify that you agree with the plan of care by signing the attached order  If you have any questions or concerns, please do not hesitate to call  I sincerely appreciate the opportunity to share in the care of one of your patients and hope to have another opportunity to work with you in the near future  Sincerely,    Bryn Anderson, PT      Referring Provider:      I certify that I have read the below Plan of Care and certify the need for these services furnished under this plan of treatment while under my care  Santo Barrera DO  [address]: [phone]          PT Re-Evaluation     Today's date: 2019  Patient name: Yudith Douglas  : 1985  MRN: [id number]  Referring provider: Natalee Perdue DO  Dx:   Encounter Diagnosis     ICD-10-CM    1  Pelvic floor dysfunction in female M62 89                   Assessment  Assessment details: Patient reports 40% improvement in pelvic dysfunction with less tail bone pain with prolong sitting, less back pain with walking, and improvement in light ADLs  Pt still unable to perform moderate to heavy ADLs due to increase in back pain and tail bone pain  Demonstrates increase in lumbar ROM, increase in pelvic floor and lower extremity strength, and improvement in relaxation of PFM  Pt continues to c/o ttp throughout pelvic floor especially around tail bone and vaginal opening   Patient would benefit from further skilled pelvic rehab with focus on improving body mechanics and strength with ADLs to maximize functional capacity and return to prior level of function  Impairments: abnormal or restricted ROM, impaired physical strength, poor posture  and poor body mechanics    Goals  Impairment Goals in 8 weeks:  1  Improve MMT for pelvic floor to greater than or equal to 2+/5 in order to improve lumbopelvic stability - MET  2  Increase hip strength to 4+/5 in order to maximize hip and lumbopelvic stability during all functional activities and improve pelvic floor functional abilities - partially met  3  Decrease PFM muscle tension to minimal tenderness - partially met    Functional Goals in 8 weeks:  1  Patient is able to ambulate community distances pushing stroller without pelvic pain or right knee pain - partially met  2  Patient is able to sit on firm chair without pelvic pain - met  3  Patient will report 75% reduction in discomfort with either palpation or intimacy in order to tolerate gynecological examination or intimacy and improve quality of life - partially met  Added 7/29/19: 4   Patient is able to kneel and bathe her baby in bathtub without back pain - partially met  Added 11/25/19: Patient is able to perform moderate ADLs (washing dishes and vacuuming) without increase in back pain upon discharge    Plan  Patient would benefit from: skilled physical therapy and PT eval  Planned therapy interventions: joint mobilization, manual therapy, neuromuscular re-education, patient education, postural training, strengthening, therapeutic activities, therapeutic exercise and home exercise program  Frequency: 1x week  Duration in weeks: 8  Plan of Care expiration date: 1/20/2020  Treatment plan discussed with: patient        Subjective Evaluation    History of Present Illness  Mechanism of injury: Pt reports return of tail bone and lower back pain on Sunday from overactivity on Saturday including washing a lot of dishes, vacuuming, and climbing up and down the the stairs multiple times  Pt noted relief in tail bone pain with sidelying release stretch instructed last visit  Pt c/o rising this morning with thoracic and lower back soreness  Pt notes still avoids bathing baby when possible, but notes less back pain when she did bathe her baby        Mechanism of injury: Bladder Function:     Voiding Difficulties: complete emptying      Voiding frequency: every 2 hours    Urinary leakage: no urine leakage    Urinary leakage aggravated by: less post-void dribble    Nocturia (episodes per night): 0    Bowel Function:     Voiding Difficulties: none    Bowel frequency: daily (once)    Stool softener use: no stool softeners    Sexual Function:    Pt still notes pain with initial vaginal penetration, but notes less pain deep penetration  Pain  At best pain ratin  At worst pain rating: 3  Location: Left pelvic Pain/Hip Adductor  Quality: dull ache    Treatments  Current treatment: physical therapy  Patient Goals  Patient goals for therapy: decreased pain, independence with ADLs/IADLs, increased strength, return to work and return to sport/leisure activities          Objective     Active Range of Motion     Lumbar   Flexion: 55 degrees   Extension: 20 degrees   Left lateral flexion: 15 degrees       Right lateral flexion: 15 degrees     Strength/Myotome Testing     Left Hip   Planes of Motion   Flexion: 4+  Extension: 4  Abduction: 4  Adduction: 4-  External rotation: 4+  Internal rotation: 4    Right Hip   Planes of Motion   Flexion: 4+  Extension: 4+  Abduction: 4+  Adduction: 3+  External rotation: 4+  Internal rotation: 4    Left Knee   Flexion: 4+  Extension: 4+    Right Knee   Flexion: 4+  Extension: 4+    Additional Strength Details  Prone knee flexion: 130* b/l (Lower back pain produced b/l)    Palpation: severe ttp along coccyx, gluts, L4-S3  Moderate ttp along T12-L3  Pelvic Floor Exam   Position: supine exam    Diastatis   Diastasis recti present? no  Connective tissue integrity at linea alba: firm  no tenderness at linea alba  able to engage transverse abdominis     General Perineum Exam:   Positive for perianal erythema  Visual Inspection of Perineum:   Excursion of perineal body in cephalad direction with contraction of pelvic floor muscles (PFM): fair   Excursion of perineal body in caudal direction with relaxation of pelvic floor muscles (PFM): fair   Involuntary contraction with coughing: yes  Involuntary relaxation with bearing down: yes  Cotton swab test: non-tender  Cough reflex: cough reflex    Pelvic Organ Prolapse   no pelvic organ prolapse  With bearing down: none    Pelvic Floor Muscle Exam     Palpation   Minimal increased muscle tension in the bulbospongiosus, super transverse perineal, pubococcygeus, iliococcygeus, coccygeus and obturator internus  Moderate increased muscle tension in the ischiocavernosus and puborectalis  Minimal tenderness on right in the super transverse perineal, pubococcygeus, iliococcygeus, coccygeus and obturator internus  Moderate tenderness on right in the bulbospongiosus, ischiocavernosus and puborectalis  Minimal tenderness on left in the super transverse perineal, pubococcygeus, iliococcygeus, coccygeus and obturator internus  Moderate tenderness on left in the bulbospongiosus, ischiocavernosus and puborectalis    Muscle Contraction: difficulty activating right PFM, but following TC improved muscle activation noted  Breathing pattern with contraction: diaphragmatic  Pelvic floor muscle relaxation is complete  100% pelvic floor relaxation  4 seconds required for complete relaxation       PERFECT Score   Power right: 2+/5  Power left: 2+/5  Endurance (seconds to max): 10  Repetitions (before fatigue): 5  Fast flicks (in 10 seconds): 5         Precautions: PCOS  Focus on core strength, PF, and functional activities    Daily Treatment Diary     Manual  10/28 11/4 11/11 11/18 11/25        [facility]        S/l PF stretch    [address] Sacral stretch in child's pose  [address] [facility] [address]        TPR to glut/IO     [address]        Hip PROM  Hip MET               Exercise Diary  10/28 11/4 11/11 11/18 11/25        Abdominal brace             SKC             Piriformis stretch             PF Lift 3"x5 3"x5   3"x5        TB rows             Child's pose arm tuck stretch (diagonal)     10"x5 ea        Seated Butterfly stretch             Hip Add Iso             TA+PF lift+hip add             DLS: KFO 1"M11            DLS: Marching 3"D74            Hip Abd s/l  10x ea 10x ea 10x         S/l clamshells  5"x10 ea           Stool Deep Squat stretch             Quadruped leg ext             Sit to stand with TB hip abd nv  BlueTB  10x blueTB  10x nv        Mini squats             Step Ups             Bicep Curls/OH lift             Shoulder Flexion/Abd AROM             Open book stretch                 Modalities

## 2019-11-25 NOTE — PROGRESS NOTES
PT Re-Evaluation     Today's date: 2019  Patient name: Mitesh Olivas  : 1985  MRN: [id number]  Referring provider: Akhil Valdez DO  Dx:   Encounter Diagnosis     ICD-10-CM    1  Pelvic floor dysfunction in female M62 89                   Assessment  Assessment details: Patient reports 40% improvement in pelvic dysfunction with less tail bone pain with prolong sitting, less back pain with walking, and improvement in light ADLs  Pt still unable to perform moderate to heavy ADLs due to increase in back pain and tail bone pain  Demonstrates increase in lumbar ROM, increase in pelvic floor and lower extremity strength, and improvement in relaxation of PFM  Pt continues to c/o ttp throughout pelvic floor especially around tail bone and vaginal opening  Patient would benefit from further skilled pelvic rehab with focus on improving body mechanics and strength with ADLs to maximize functional capacity and return to prior level of function  Impairments: abnormal or restricted ROM, impaired physical strength, poor posture  and poor body mechanics    Goals  Impairment Goals in 8 weeks:  1  Improve MMT for pelvic floor to greater than or equal to 2+/5 in order to improve lumbopelvic stability - MET  2  Increase hip strength to 4+/5 in order to maximize hip and lumbopelvic stability during all functional activities and improve pelvic floor functional abilities - partially met  3  Decrease PFM muscle tension to minimal tenderness - partially met    Functional Goals in 8 weeks:  1  Patient is able to ambulate community distances pushing stroller without pelvic pain or right knee pain - partially met  2  Patient is able to sit on firm chair without pelvic pain - met  3  Patient will report 75% reduction in discomfort with either palpation or intimacy in order to tolerate gynecological examination or intimacy and improve quality of life - partially met  Added 19: 4   Patient is able to kneel and bathe her baby in bathtub without back pain - partially met  Added 19: Patient is able to perform moderate ADLs (washing dishes and vacuuming) without increase in back pain upon discharge    Plan  Patient would benefit from: skilled physical therapy and PT eval  Planned therapy interventions: joint mobilization, manual therapy, neuromuscular re-education, patient education, postural training, strengthening, therapeutic activities, therapeutic exercise and home exercise program  Frequency: 1x week  Duration in weeks: 8  Plan of Care expiration date: 2020  Treatment plan discussed with: patient        Subjective Evaluation    History of Present Illness  Mechanism of injury: Pt reports return of tail bone and lower back pain on  from overactivity on Saturday including washing a lot of dishes, vacuuming, and climbing up and down the the stairs multiple times  Pt noted relief in tail bone pain with sidelying release stretch instructed last visit  Pt c/o rising this morning with thoracic and lower back soreness  Pt notes still avoids bathing baby when possible, but notes less back pain when she did bathe her baby        Mechanism of injury: Bladder Function:     Voiding Difficulties: complete emptying      Voiding frequency: every 2 hours    Urinary leakage: no urine leakage    Urinary leakage aggravated by: less post-void dribble    Nocturia (episodes per night): 0    Bowel Function:     Voiding Difficulties: none    Bowel frequency: daily (once)    Stool softener use: no stool softeners    Sexual Function:    Pt still notes pain with initial vaginal penetration, but notes less pain deep penetration  Pain  At best pain ratin  At worst pain rating: 3  Location: Left pelvic Pain/Hip Adductor  Quality: dull ache    Treatments  Current treatment: physical therapy  Patient Goals  Patient goals for therapy: decreased pain, independence with ADLs/IADLs, increased strength, return to work and return to [employer] activities          Objective     Active Range of Motion     Lumbar   Flexion: 55 degrees   Extension: 20 degrees   Left lateral flexion: 15 degrees       Right lateral flexion: 15 degrees     Strength/Myotome Testing     Left Hip   Planes of Motion   Flexion: 4+  Extension: 4  Abduction: 4  Adduction: 4-  External rotation: 4+  Internal rotation: 4    Right Hip   Planes of Motion   Flexion: 4+  Extension: 4+  Abduction: 4+  Adduction: 3+  External rotation: 4+  Internal rotation: 4    Left Knee   Flexion: 4+  Extension: 4+    Right Knee   Flexion: 4+  Extension: 4+    Additional Strength Details  Prone knee flexion: 130* b/l (Lower back pain produced b/l)    Palpation: severe ttp along coccyx, gluts, L4-S3  Moderate ttp along T12-L3  Pelvic Floor Exam   Position: supine exam    Diastatis   Diastasis recti present? no  Connective tissue integrity at linea alba: firm  no tenderness at linea alba  able to engage transverse abdominis     General Perineum Exam:   Positive for perianal erythema       Visual Inspection of Perineum:   Excursion of perineal body in cephalad direction with contraction of pelvic floor muscles (PFM): fair   Excursion of perineal body in caudal direction with relaxation of pelvic floor muscles (PFM): fair   Involuntary contraction with coughing: yes  Involuntary relaxation with bearing down: yes  Cotton swab test: non-tender  Cough reflex: cough reflex    Pelvic Organ Prolapse   no pelvic organ prolapse  With bearing down: none    Pelvic Floor Muscle Exam     Palpation   Minimal increased muscle tension in the bulbospongiosus, super transverse perineal, pubococcygeus, iliococcygeus, coccygeus and obturator internus  Moderate increased muscle tension in the ischiocavernosus and puborectalis  Minimal tenderness on right in the super transverse perineal, pubococcygeus, iliococcygeus, coccygeus and obturator internus  Moderate tenderness on right in the bulbospongiosus, ischiocavernosus and puborectalis  Minimal tenderness on left in the super transverse perineal, pubococcygeus, iliococcygeus, coccygeus and obturator internus  Moderate tenderness on left in the bulbospongiosus, ischiocavernosus and puborectalis    Muscle Contraction: difficulty activating right PFM, but following TC improved muscle activation noted  Breathing pattern with contraction: diaphragmatic  Pelvic floor muscle relaxation is complete  100% pelvic floor relaxation  4 seconds required for complete relaxation       PERFECT Score   Power right: 2+/5  Power left: 2+/5  Endurance (seconds to max): 10  Repetitions (before fatigue): 5  Fast flicks (in 10 seconds): 5         Precautions: PCOS  Focus on core strength, PF, and functional activities    Daily Treatment Diary     Manual  10/28 11/4 11/11 11/18 11/25        PFM STM [address] [facility] [place]        S/l PF stretch    [address] [place]        Sacral stretch in child's pose  [address] [facility] [address]        TPR to glut/IO     [address]        Hip PROM  Hip MET               Exercise Diary  10/28 11/4 11/11 11/18 11/25        Abdominal brace             SKC             Piriformis stretch             PF Lift 3"x5 3"x5   3"x5        TB rows             Child's pose arm tuck stretch (diagonal)     10"x5 ea        Seated Butterfly stretch             Hip Add Iso             TA+PF lift+hip add             DLS: KFO 1"L30            DLS: Marching 8"D17            Hip Abd s/l  10x ea 10x ea 10x         S/l clamshells  5"x10 ea           Stool Deep Squat stretch             Quadruped leg ext             Sit to stand with TB hip abd nv  BlueTB  10x blueTB  10x nv        Mini squats             Step Ups             Bicep Curls/OH lift             Shoulder Flexion/Abd AROM             Open book stretch                 Modalities

## 2019-12-05 ENCOUNTER — OFFICE VISIT (OUTPATIENT)
Dept: PHYSICAL THERAPY | Facility: CLINIC | Age: 34
End: 2019-12-05
Payer: COMMERCIAL

## 2019-12-05 DIAGNOSIS — M62.89 PELVIC FLOOR DYSFUNCTION IN FEMALE: Primary | ICD-10-CM

## 2019-12-05 PROCEDURE — 97110 THERAPEUTIC EXERCISES: CPT | Performed by: PHYSICAL THERAPIST

## 2019-12-05 PROCEDURE — 97140 MANUAL THERAPY 1/> REGIONS: CPT | Performed by: PHYSICAL THERAPIST

## 2019-12-05 PROCEDURE — 97112 NEUROMUSCULAR REEDUCATION: CPT | Performed by: PHYSICAL THERAPIST

## 2019-12-05 NOTE — PROGRESS NOTES
Daily Note     Today's date: 2019  Patient name: Ashley Simons  : 1985  MRN: [id number]  Referring provider: Kiki Alonso DO  Dx:   Encounter Diagnosis     ICD-10-CM    1  Pelvic floor dysfunction in female M62 89                   Subjective: Pt reports no new complaints  Objective: See treatment diary below      Assessment: Pt required cues with marches, but demonstrated improvement in core stabilization  Demonstrates decrease in muscle spasm in left glut with TPR  Patient would benefit from continued PT      Plan: Continue per plan of care        Precautions: PCOS  Focus on core strength, PF, and functional activities    Daily Treatment Diary     Manual  10/28 11/4 11/11 11/18 11/25 12/5       PFM STM [place]        S/l PF stretch    [address] [place]       Sacral stretch in child's pose  [address] [facility] [address]        TPR to glut/IO     [address]       Hip PROM  Hip MET               Exercise Diary  10/28 11/4 11/11 11/18 11/25 12/5       Bike       L1 5'       SKC             Piriformis stretch      deep breathing  10x       PF Lift 3"x5 3"x5   3"x5 3"x5       TB rows             Child's pose arm tuck stretch (diagonal)     10"x5 ea        Seated Butterfly stretch             Hip Add Iso             TA+PF lift+hip add             DLS: KFO 0"V62     7"T82       DLS: Marching 5"V70     5"x10       Hip Abd s/l  10x ea 10x ea 10x         S/l clamshells  5"x10 ea           Stool Deep Squat stretch             Quadruped leg ext      nv       Sit to stand with TB hip abd nv  BlueTB  10x blueTB  10x nv        Mini squats             Step Ups             Bicep Curls/OH lift             Shoulder Flexion/Abd AROM             Open book stretch                 Modalities

## 2019-12-12 ENCOUNTER — APPOINTMENT (OUTPATIENT)
Dept: PHYSICAL THERAPY | Facility: CLINIC | Age: 34
End: 2019-12-12
Payer: COMMERCIAL

## 2019-12-19 ENCOUNTER — OFFICE VISIT (OUTPATIENT)
Dept: PHYSICAL THERAPY | Facility: CLINIC | Age: 34
End: 2019-12-19
Payer: COMMERCIAL

## 2019-12-19 DIAGNOSIS — M62.89 PELVIC FLOOR DYSFUNCTION IN FEMALE: Primary | ICD-10-CM

## 2019-12-19 PROCEDURE — 97140 MANUAL THERAPY 1/> REGIONS: CPT | Performed by: PHYSICAL THERAPIST

## 2019-12-19 PROCEDURE — 97112 NEUROMUSCULAR REEDUCATION: CPT | Performed by: PHYSICAL THERAPIST

## 2019-12-19 PROCEDURE — 97110 THERAPEUTIC EXERCISES: CPT | Performed by: PHYSICAL THERAPIST

## 2019-12-19 NOTE — PROGRESS NOTES
Daily Note     Today's date: 2019  Patient name: Eleno Gallardo  : 1985  MRN: [id number]  Referring provider: Edith Smith DO  Dx:   Encounter Diagnosis     ICD-10-CM    1  Pelvic floor dysfunction in female M62 89                   Subjective: Pt reports aggravation in lower back pain, but notes she has been keeping up with the household chores lately  Objective: See treatment diary below      Assessment: Demonstrates some improvement in activating TA with supine marches following cues  Reviewed HEP and wall squats pt performing in yoga class  Added quadruped TA with notable challenge with preperception and core stability in maintaining neutral spine  Plan to update HEP with quadruped TA when demonstrated proper form  Patient would benefit from continued PT      Plan: Continue per plan of care        Precautions: PCOS  Focus on core strength, PF, and functional activities    Daily Treatment Diary     Manual  10/28 11/4 11/11 11/18 11/25 12/5 12/19      PFM STM [facility]  [address]      S/l PF stretch    [address]       Sacral stretch in child's pose  [address] [facility] [address]        TPR to glut/IO     [address]      Hip PROM  Hip MET               Exercise Diary  10/28 11/4 11/11 11/18 11/25 12/5 12/19      Bike       L1 5' L1 5'      SKC             Piriformis stretch      deep breathing  10x       PF Lift 3"x5 3"x5   3"x5 3"x5       TB rows             Child's pose arm tuck stretch (diagonal)     10"x5 ea        Seated Butterfly stretch             Hip Add Iso             TA+PF lift+hip add             DLS: KFO 8"W65     5"Y16       DLS: Marching 8"I43     5"x10 5"x10      Hip Abd s/l  10x ea 10x ea 10x         S/l clamshells  5"x10 ea           Quadruped TA       10"x10      Quadruped leg ext      nv       Sit to stand with TB hip abd nv  BlueTB  10x blueTB  10x nv        Mini squats             Step Ups             Bicep Curls/OH lift             Shoulder Flexion/Abd AROM             Open book stretch Modalities

## 2019-12-26 ENCOUNTER — OFFICE VISIT (OUTPATIENT)
Dept: PHYSICAL THERAPY | Facility: CLINIC | Age: 34
End: 2019-12-26
Payer: COMMERCIAL

## 2019-12-26 DIAGNOSIS — M62.89 PELVIC FLOOR DYSFUNCTION IN FEMALE: Primary | ICD-10-CM

## 2019-12-26 PROCEDURE — 97110 THERAPEUTIC EXERCISES: CPT | Performed by: PHYSICAL THERAPIST

## 2019-12-26 PROCEDURE — 97112 NEUROMUSCULAR REEDUCATION: CPT | Performed by: PHYSICAL THERAPIST

## 2019-12-26 PROCEDURE — 97140 MANUAL THERAPY 1/> REGIONS: CPT | Performed by: PHYSICAL THERAPIST

## 2019-12-26 NOTE — PROGRESS NOTES
Daily Note     Today's date: 2019  Patient name: Maddison Rajan  : 1985  MRN: [id number]  Referring provider: Coco Garibay DO  Dx:   Encounter Diagnosis     ICD-10-CM    1  Pelvic floor dysfunction in female M62 89                   Subjective: Pt reports soreness in lower back from prolong standing and carrying her son over the holidays  Objective: See treatment diary below      Assessment: Demonstrates appropriate challenge with core stabilization with Marching and Quad TA  Increase in PFM tension noted with some relief with STM  Pt notes overall relief in pelvic pain following PFM manuals  Patient would benefit from continued PT      Plan: Continue per plan of care        Precautions: PCOS  Focus on core strength, PF, and functional activities    Daily Treatment Diary     Manual  10/28 11/4 11/11 11/18 11/25 12/5 12/19 12/26     PFM STM [place] [address]  [address]     S/l PF stretch    [address]       Sacral stretch in child's pose  [address] [facility] [address]   [address]     TPR to glut/IO     [address]      Hip PROM  Hip MET               Exercise Diary  10/28 11/4 11/11 11/18 11/25 12/5 12/19 12/26     Bike       L1 5' L1 5' L1 5'     SKC             Piriformis stretch      deep breathing  10x       PF Lift 3"x5 3"x5   3"x5 3"x5  3"x5     TB rows             Child's pose arm tuck stretch (diagonal)     10"x5 ea        Seated Butterfly stretch             Hip Add Iso             TA+PF lift+hip add             DLS: KFO 0"O16     1"C52       DLS: Marching 8"D27     5"x10 5"x10 5"x10     Hip Abd s/l  10x ea 10x ea 10x    nv     S/l clamshells  5"x10 ea           Quadruped TA       10"x10 10"x10     Quadruped leg ext      nv       Sit to stand with TB hip abd nv  BlueTB  10x blueTB  10x nv        Mini squats        nv     Step Ups             Bicep Curls/OH lift             Shoulder Flexion/Abd AROM             Open book stretch                 Modalities

## 2019-12-30 LAB
1,25(OH)2D SERPL-MCNC: 39 PG/ML (ref 18–72)
1,25(OH)2D2 SERPL-MCNC: <8 PG/ML
1,25(OH)2D3 SERPL-MCNC: 39 PG/ML
ALBUMIN SERPL-MCNC: 4.4 G/DL (ref 3.6–5.1)
ALBUMIN/GLOB SERPL: 1.5 (CALC) (ref 1–2.5)
ALP SERPL-CCNC: 81 U/L (ref 33–115)
ALT SERPL-CCNC: 24 U/L (ref 6–29)
AST SERPL-CCNC: 11 U/L (ref 10–30)
BILIRUB SERPL-MCNC: 0.3 MG/DL (ref 0.2–1.2)
BUN SERPL-MCNC: 17 MG/DL (ref 7–25)
BUN/CREAT SERPL: NORMAL (CALC) (ref 6–22)
CALCIUM SERPL-MCNC: 9.2 MG/DL (ref 8.6–10.2)
CHLORIDE SERPL-SCNC: 105 MMOL/L (ref 98–110)
CHOLEST SERPL-MCNC: 177 MG/DL
CHOLEST/HDLC SERPL: 3.2 (CALC)
CO2 SERPL-SCNC: 23 MMOL/L (ref 20–32)
CREAT SERPL-MCNC: 0.88 MG/DL (ref 0.5–1.1)
CRP SERPL-MCNC: 2.9 MG/L
GLOBULIN SER CALC-MCNC: 3 G/DL (CALC) (ref 1.9–3.7)
GLUCOSE SERPL-MCNC: 85 MG/DL (ref 65–99)
HDLC SERPL-MCNC: 55 MG/DL
IRON SERPL-MCNC: 95 MCG/DL (ref 40–190)
LDLC SERPL CALC-MCNC: 97 MG/DL (CALC)
MAGNESIUM SERPL-MCNC: 1.8 MG/DL (ref 1.5–2.5)
NONHDLC SERPL-MCNC: 122 MG/DL (CALC)
POTASSIUM SERPL-SCNC: 4.2 MMOL/L (ref 3.5–5.3)
PROT SERPL-MCNC: 7.4 G/DL (ref 6.1–8.1)
SL AMB EGFR AFRICAN AMERICAN: 99 ML/MIN/1.73M2
SL AMB EGFR NON AFRICAN AMERICAN: 86 ML/MIN/1.73M2
SODIUM SERPL-SCNC: 137 MMOL/L (ref 135–146)
TESTOST SERPL-MCNC: 95 NG/DL (ref 2–45)
TRIGL SERPL-MCNC: 148 MG/DL
VIT B12 SERPL-MCNC: 529 PG/ML (ref 200–1100)

## 2020-01-09 ENCOUNTER — APPOINTMENT (OUTPATIENT)
Dept: PHYSICAL THERAPY | Facility: CLINIC | Age: 35
End: 2020-01-09
Payer: COMMERCIAL

## 2020-01-16 ENCOUNTER — APPOINTMENT (OUTPATIENT)
Dept: PHYSICAL THERAPY | Facility: CLINIC | Age: 35
End: 2020-01-16
Payer: COMMERCIAL

## 2020-01-30 ENCOUNTER — EVALUATION (OUTPATIENT)
Dept: PHYSICAL THERAPY | Facility: CLINIC | Age: 35
End: 2020-01-30
Payer: COMMERCIAL

## 2020-01-30 DIAGNOSIS — G89.29 CHRONIC BILATERAL LOW BACK PAIN WITHOUT SCIATICA: ICD-10-CM

## 2020-01-30 DIAGNOSIS — M62.89 PELVIC FLOOR DYSFUNCTION IN FEMALE: Primary | ICD-10-CM

## 2020-01-30 DIAGNOSIS — M54.50 CHRONIC BILATERAL LOW BACK PAIN WITHOUT SCIATICA: ICD-10-CM

## 2020-01-30 PROCEDURE — 97140 MANUAL THERAPY 1/> REGIONS: CPT | Performed by: PHYSICAL THERAPIST

## 2020-01-30 PROCEDURE — 97110 THERAPEUTIC EXERCISES: CPT | Performed by: PHYSICAL THERAPIST

## 2020-01-30 NOTE — PROGRESS NOTES
PT Re-Evaluation     Today's date: 2020  Patient name: Lyndon Rodriguez  : 1985  MRN: [id number]  Referring provider: Paula Diaz DO  Dx:   Encounter Diagnosis     ICD-10-CM    1  Pelvic floor dysfunction in female M62 89    2  Chronic bilateral low back pain without sciatica M54 5     G89 29                   Assessment  Assessment details: Patient reports 50% improvement in tail bone pain and overall functional capacity, but still notes persistent lower back pain affecting ADLs  Demonstrates increase in LE strength, but persistent weakness in PFM and core with ttp and muscle spasm noted in QL, gluts, and lumbar pvm  Patient would benefit from further skilled physical therapy in order to address deficits and decrease lumbar/tail bone pain to maximize functional capacity  Impairments: abnormal muscle tone, abnormal or restricted ROM, impaired physical strength, lacks appropriate home exercise program, pain with function and poor posture     Goals  Impairment Goals in 8 weeks:  1  Improve MMT for pelvic floor to greater than or equal to 2+/5 in order to improve lumbopelvic stability - MET  2  Increase hip strength to 4+/5 in order to maximize hip and lumbopelvic stability during all functional activities and improve pelvic floor functional abilities - met  3  Decrease PFM muscle tension to minimal tenderness - partially met  Added 20: 4  Increase pelvic floor strength to 3+/5 in order to improve lumbopelvic stability    Functional Goals in 8 weeks:  1  Patient is able to ambulate community distances pushing stroller without pelvic pain or right knee pain - partially met  2  Patient is able to sit on firm chair without pelvic pain - met  3  Patient will report 75% reduction in discomfort with either palpation or intimacy in order to tolerate gynecological examination or intimacy and improve quality of life - partially met  Added 19: 4   Patient is able to kneel and bathe her baby in bathtub without back pain - partially met  Added 19: Patient is able to perform moderate ADLs (washing dishes and vacuuming) without increase in back pain upon discharge - partially met    Plan  Patient would benefit from: skilled physical therapy  Planned modality interventions: biofeedback  Planned therapy interventions: manual therapy, neuromuscular re-education, patient education, self care, strengthening, stretching, home exercise program and behavior modification  Frequency: 2x month  Duration in weeks: 8  Plan of Care expiration date: 3/26/2020  Treatment plan discussed with: patient        PT Pelvic Floor Subjective:   History of Present Illness:   Pt notes menstrual cycle started 20 has been off and on every since  Pt notes heavy bleeding and cramping today  Pt had IUD inserted on and had steroid shot in left PFM on 20  Pt notes difficulty noting if shot decreased pelvic floor pain due to heavy cycle causing pelvic pain and lower back pain       Chief Complaint:  Carrying baby up and down the stairs, kneeling in Spiritism, and sitting on hard chairs    Lower back pain:  At worst: 8/10 (with ADLs)  At best: 3/10 (at rest)  Social Support:     Life stress severity: moderate  Diet and Exercise:    Co-morbidities:    Pt    Menstrual History:      Birth control method: IUD  Bladder Function:     Voiding Difficulties comments:     Voiding frequency: every 3-4 hours    Urinary leakage: urine leakage    Urinary leakage aggravated by: coughing    Nocturia (episodes per night): 1  Bowel Function:     Bowel frequency: daily    Stool softener use: no stool softeners    Enema use: no enema    Uses "squatty potty": Squatty Potty  Sexual Function:     Sexually Active:  Sexually active    Pain during intercourse: Yes      pain causes abstinence    Patient wishes to return to having intercourse: currently unable to have intercourse but wants to  Pain:     At best pain ratin    At worst pain ratin    Location: Left pelvic Pain/Hip Adductor      Objective     Active Range of Motion     Lumbar   Flexion: 65 degrees   Extension: 15 degrees     Additional Active Range of Motion Details    Trendelenburg: positive b/l  Decrease hypomobility of left SI joint    Strength/Myotome Testing     Left Hip   Planes of Motion   Flexion: 4+  Extension: 4-  Abduction: 4+  Adduction: 3  External rotation: 4+  Internal rotation: 4+    Right Hip   Planes of Motion   Flexion: 4  Extension: 4  Abduction: 4+  Adduction: 4  External rotation: 4+  Internal rotation: 4+    Left Knee   Flexion: 4+  Extension: 4+    Right Knee   Flexion: 4+  Extension: 4+    Additional Strength Details  LBP with end-range hip flexion in supine  Passive SLR: 65* , 62* L    Leg length: left ASIS is superiorly rotated    Prone knee flexion: 120* L, 130* R (Lower back pain produced b/l)    Palpation: severe ttp along coccyx, gluts, L4-S3  Moderate ttp along T12-L3  Pelvic Floor Exam     General Perineum Exam:   Positive for no pelvic organ prolapse at rest      Visual Inspection of Perineum:   Excursion of perineal body in cephalad direction with contraction of pelvic floor muscles (PFM): fair   Excursion of perineal body in caudal direction with relaxation of pelvic floor muscles (PFM): delayed  and fair   Involuntary contraction with coughing: yes  Involuntary relaxation with bearing down: yes    Pelvic Organ Prolapse   At rest: none  With bearing down: none    Pelvic Floor Muscle Exam     Palpation   Minimal increased muscle tension in the super transverse perineal  Moderate increased muscle tension in the bulbospongiosus, ischiocavernosus, puborectalis, pubococcygeus, iliococcygeus and coccygeus  Minimal tenderness on right in the super transverse perineal  Moderate tenderness on right in the bulbospongiosus, ischiocavernosus, puborectalis, pubococcygeus, iliococcygeus and coccygeus  No tenderness on left in the super transverse perineal  Minimal tenderness on left in the bulbospongiosus, ischiocavernosus, puborectalis, pubococcygeus, iliococcygeus and coccygeus    Muscle Contraction: well isolated  Breathing pattern with contraction: diaphragmatic  Pelvic floor muscle relaxation is delayed  100% pelvic floor relaxation  4 seconds required for complete relaxation       PERFECT Score   Power right: 2+/5  Power left: 2+/5  Endurance (seconds to max): 10  Repetitions (before fatigue): 5  Fast flicks (in 10 seconds): 5                  Precautions: PCOS  Focus on core strength, PF, and functional activities    Daily Treatment Diary     Manual  10/28 11/4 11/11 11/18 11/25 12/5 12/19 12/26 1/30    PFM STM [facility]    S/l PF stretch    [address]       Sacral stretch in child's pose  [address] [facility] [address]   [address]     TPR to glut/IO     [address]  TPR to left QL    Hip PROM  Hip MET       Hip MET        Exercise Diary  10/28 11/4 11/11 11/18 11/25 12/5 12/19 12/26 1/30    Bike       L1 5' L1 5' L1 5'     SKC             Piriformis stretch      deep breathing  10x       PF Lift 3"x5 3"x5   3"x5 3"x5  3"x5 10"x5    TB rows             Child's pose arm tuck stretch (diagonal)     10"x5 ea        Seated Butterfly stretch             Hip Add Iso             TA+PF lift+hip add             SLR             DLS: Marching 1"Z40     5"x10 5"x10 5"x10 nv    Hip Abd s/l  10x ea 10x ea 10x    nv     S/l clamshells  5"x10 ea           Quadruped TA       10"x10 10"x10 nv    Quadruped leg ext      nv   nv    Sit to stand with TB hip abd nv  BlueTB  10x blueTB  10x nv        Mini squats        nv nv    Seated QL stretch         10"x5 ea                                               Modalities

## 2020-01-30 NOTE — LETTER
2020    Sigifredo Cifuentes [address]  [address] [facility]    Patient: Girish Fernández   YOB: 1985   Date of Visit: 2020     Encounter Diagnosis     ICD-10-CM    1  Pelvic floor dysfunction in female M62 89    2  Chronic bilateral low back pain without sciatica M54 5     G89 29        Dear Dr Kimberley Garcia:    Thank you for your recent referral of Girish Fernández  Please review the attached evaluation summary from Laurren's recent visit  Please verify that you agree with the plan of care by signing the attached order  If you have any questions or concerns, please do not hesitate to call  I sincerely appreciate the opportunity to share in the care of one of your patients and hope to have another opportunity to work with you in the near future  Sincerely,    Skylar Del Valle, PT      Referring Provider:      I certify that I have read the below Plan of Care and certify the need for these services furnished under this plan of treatment while under my care  Bobbi Cain DO  [address]: [phone]          PT Re-Evaluation     Today's date: 2020  Patient name: Girish Fernández  : 1985  MRN: [id number]  Referring provider: Rock Dalton DO  Dx:   Encounter Diagnosis     ICD-10-CM    1  Pelvic floor dysfunction in female M62 89    2  Chronic bilateral low back pain without sciatica M54 5     G89 29                   Assessment  Assessment details: Patient reports 50% improvement in tail bone pain and overall functional capacity, but still notes persistent lower back pain affecting ADLs  Demonstrates increase in LE strength, but persistent weakness in PFM and core with ttp and muscle spasm noted in QL, gluts, and lumbar pvm   Patient would benefit from further skilled physical therapy in order to address deficits and decrease lumbar/tail bone pain to maximize functional capacity  Impairments: abnormal muscle tone, abnormal or restricted ROM, impaired physical strength, lacks appropriate home exercise program, pain with function and poor posture     Goals  Impairment Goals in 8 weeks:  1  Improve MMT for pelvic floor to greater than or equal to 2+/5 in order to improve lumbopelvic stability - MET  2  Increase hip strength to 4+/5 in order to maximize hip and lumbopelvic stability during all functional activities and improve pelvic floor functional abilities - met  3  Decrease PFM muscle tension to minimal tenderness - partially met  Added 1/30/20: 4  Increase pelvic floor strength to 3+/5 in order to improve lumbopelvic stability    Functional Goals in 8 weeks:  1  Patient is able to ambulate community distances pushing stroller without pelvic pain or right knee pain - partially met  2  Patient is able to sit on firm chair without pelvic pain - met  3  Patient will report 75% reduction in discomfort with either palpation or intimacy in order to tolerate gynecological examination or intimacy and improve quality of life - partially met  Added 7/29/19: 4  Patient is able to kneel and bathe her baby in bathtub without back pain - partially met  Added 11/25/19: Patient is able to perform moderate ADLs (washing dishes and vacuuming) without increase in back pain upon discharge - partially met    Plan  Patient would benefit from: skilled physical therapy  Planned modality interventions: biofeedback  Planned therapy interventions: manual therapy, neuromuscular re-education, patient education, self care, strengthening, stretching, home exercise program and behavior modification  Frequency: 2x month  Duration in weeks: 8  Plan of Care expiration date: 3/26/2020  Treatment plan discussed with: patient        PT Pelvic Floor Subjective:   History of Present Illness:   Pt notes menstrual cycle started 1/5/20 has been off and on every since  Pt notes heavy bleeding and cramping today   Pt had IUD inserted on and had steroid shot in left PFM on 20  Pt notes difficulty noting if shot decreased pelvic floor pain due to heavy cycle causing pelvic pain and lower back pain       Chief Complaint:  Carrying baby up and down the stairs, kneeling in [employer], and sitting on hard chairs    Lower back pain:  At worst: 8/10 (with ADLs)  At best: 3/10 (at rest)  Social Support:     Life stress severity: moderate  Diet and Exercise:    Co-morbidities:    Pt    Menstrual History:      Birth control method: IUD  Bladder Function:     Voiding Difficulties comments:     Voiding frequency: every 3-4 hours    Urinary leakage: urine leakage    Urinary leakage aggravated by: coughing    Nocturia (episodes per night): 1  Bowel Function:     Bowel frequency: daily    Stool softener use: no stool softeners    Enema use: no enema    Uses "squatty potty": Squatty Potty  Sexual Function:     Sexually Active:  Sexually active    Pain during intercourse: Yes      pain causes abstinence    Patient wishes to return to having intercourse: currently unable to have intercourse but wants to  Pain:     At best pain ratin    At worst pain ratin    Location:  Left pelvic Pain/Hip Adductor      Objective     Active Range of Motion     Lumbar   Flexion: 65 degrees   Extension: 15 degrees     Additional Active Range of Motion Details    Trendelenburg: positive b/l  Decrease hypomobility of left SI joint    Strength/Myotome Testing     Left Hip   Planes of Motion   Flexion: 4+  Extension: 4-  Abduction: 4+  Adduction: 3  External rotation: 4+  Internal rotation: 4+    Right Hip   Planes of Motion   Flexion: 4  Extension: 4  Abduction: 4+  Adduction: 4  External rotation: 4+  Internal rotation: 4+    Left Knee   Flexion: 4+  Extension: 4+    Right Knee   Flexion: 4+  Extension: 4+    Additional Strength Details  LBP with end-range hip flexion in supine  Passive SLR: 65* , 62* L    Leg length: left ASIS is superiorly rotated    Prone knee flexion: 120* L, 130* R (Lower back pain produced b/l)    Palpation: severe ttp along coccyx, gluts, L4-S3  Moderate ttp along T12-L3  Pelvic Floor Exam     General Perineum Exam:   Positive for no pelvic organ prolapse at rest      Visual Inspection of Perineum:   Excursion of perineal body in cephalad direction with contraction of pelvic floor muscles (PFM): fair   Excursion of perineal body in caudal direction with relaxation of pelvic floor muscles (PFM): delayed  and fair   Involuntary contraction with coughing: yes  Involuntary relaxation with bearing down: yes    Pelvic Organ Prolapse   At rest: none  With bearing down: none    Pelvic Floor Muscle Exam     Palpation   Minimal increased muscle tension in the super transverse perineal  Moderate increased muscle tension in the bulbospongiosus, ischiocavernosus, puborectalis, pubococcygeus, iliococcygeus and coccygeus  Minimal tenderness on right in the super transverse perineal  Moderate tenderness on right in the bulbospongiosus, ischiocavernosus, puborectalis, pubococcygeus, iliococcygeus and coccygeus  No tenderness on left in the super transverse perineal  Minimal tenderness on left in the bulbospongiosus, ischiocavernosus, puborectalis, pubococcygeus, iliococcygeus and coccygeus    Muscle Contraction: well isolated  Breathing pattern with contraction: diaphragmatic  Pelvic floor muscle relaxation is delayed  100% pelvic floor relaxation  4 seconds required for complete relaxation       PERFECT Score   Power right: 2+/5  Power left: 2+/5  Endurance (seconds to max): 10  Repetitions (before fatigue): 5  Fast flicks (in 10 seconds): 5                  Precautions: PCOS  Focus on core strength, PF, and functional activities    Daily Treatment Diary     Manual  10/28 11/4 11/11 11/18 11/25 12/5 12/19 12/26 1/30    PFM STM [address] MONET    S/l PF stretch    [address] MONET       Sacral stretch in child's pose  [address] [facility] [address]   [address]     TPR to glut/IO     [address]  TPR to left QL    Hip PROM  Hip MET       Hip MET        Exercise Diary  10/28 11/4 11/11 11/18 11/25 12/5 12/19 12/26 1/30    Bike       L1 5' L1 5' L1 5'     SKC             Piriformis stretch      deep breathing  10x       PF Lift 3"x5 3"x5   3"x5 3"x5  3"x5 10"x5    TB rows             Child's pose arm tuck stretch (diagonal)     10"x5 ea        Seated Butterfly stretch             Hip Add Iso             TA+PF lift+hip add             SLR             DLS: Marching 4"C17     5"x10 5"x10 5"x10 nv    Hip Abd s/l  10x ea 10x ea 10x    nv     S/l clamshells  5"x10 ea           Quadruped TA       10"x10 10"x10 nv    Quadruped leg ext      nv   nv    Sit to stand with TB hip abd nv  BlueTB  10x blueTB  10x nv        Mini squats        nv nv    Seated QL stretch         10"x5 ea                                               Modalities

## 2020-01-31 ENCOUNTER — TRANSCRIBE ORDERS (OUTPATIENT)
Dept: PHYSICAL THERAPY | Facility: CLINIC | Age: 35
End: 2020-01-31

## 2020-01-31 DIAGNOSIS — M62.89 MYOFIBROSIS: Primary | ICD-10-CM

## 2020-02-06 ENCOUNTER — OFFICE VISIT (OUTPATIENT)
Dept: PHYSICAL THERAPY | Facility: CLINIC | Age: 35
End: 2020-02-06
Payer: COMMERCIAL

## 2020-02-06 DIAGNOSIS — M54.50 CHRONIC BILATERAL LOW BACK PAIN WITHOUT SCIATICA: ICD-10-CM

## 2020-02-06 DIAGNOSIS — M62.89 PELVIC FLOOR DYSFUNCTION IN FEMALE: Primary | ICD-10-CM

## 2020-02-06 DIAGNOSIS — G89.29 CHRONIC BILATERAL LOW BACK PAIN WITHOUT SCIATICA: ICD-10-CM

## 2020-02-06 PROCEDURE — 97110 THERAPEUTIC EXERCISES: CPT | Performed by: PHYSICAL THERAPIST

## 2020-02-06 PROCEDURE — 97112 NEUROMUSCULAR REEDUCATION: CPT | Performed by: PHYSICAL THERAPIST

## 2020-02-06 PROCEDURE — 97530 THERAPEUTIC ACTIVITIES: CPT | Performed by: PHYSICAL THERAPIST

## 2020-02-06 PROCEDURE — 97140 MANUAL THERAPY 1/> REGIONS: CPT | Performed by: PHYSICAL THERAPIST

## 2020-02-06 NOTE — PROGRESS NOTES
Daily Note     Today's date: 2020  Patient name: Adi Mcdowell  : 1985  MRN: [id number]  Referring provider: Gretchen Velez DO  Dx:   Encounter Diagnosis     ICD-10-CM    1  Pelvic floor dysfunction in female M62 89    2  Chronic bilateral low back pain without sciatica M54 5     G89 29                   Subjective: Pt c/o lower back pain this week  Objective: See treatment diary below      Assessment: Reviewed mini squats and corrected form to avoid lumbar hyperextension  Discussed importance of proper body mechanics for lifting to void back pain  Continues to demonstrate challenge with marching and s/l chamshells on the right  Discussed importance of standing posture to limit strain on gluts and encourage core activation  Patient would benefit from continued PT      Plan: Continue per plan of care           Precautions: PCOS  Focus on core strength, PF, and functional activities (lifting baby and box)    Daily Treatment Diary     Manual  10/28 11/4 11/11 11/18 11/25 12/5 12/19 12/26 1/30 2/6   PFM STM [address] MONET    S/l PF stretch    [address]    MONET   Sacral stretch in child's pose  [address] [facility] [address]   [address]     TPR to glut/IO     [address]  TPR to left QL MONET   Hip PROM  Hip MET       Hip MET [address]       Exercise Diary  10/28 11/4 11/11 11/18 11/25 12/5 12/19 12/26 1/30 2/6   Bike       L1 5' L1 5' L1 5'     SKC             Piriformis stretch      deep breathing  10x       PF Lift 3"x5 3"x5   3"x5 3"x5  3"x5 10"x5 5"x10  With TA   TB rows             Child's pose arm tuck stretch (diagonal)     10"x5 ea        Seated Butterfly stretch             Hip Add Iso             TA+PF lift+hip add             SLR             DLS: Marching 2"E39     5"x10 5"x10 5"x10 nv 5"x5 ea   Hip Abd s/l  10x ea 10x ea 10x    nv     S/l clamshells  5"x10 ea        5"x10 ea   Quadruped TA       10"x10 10"x10 nv    Quadruped leg ext      nv   nv    Sit to stand with TB hip abd nv  BlueTB  10x blueTB  10x nv        Mini squats        nv nv    Seated QL stretch         10"x5 ea    TB Shld Ext  standing          nv                                 Modalities

## 2020-02-20 ENCOUNTER — OFFICE VISIT (OUTPATIENT)
Dept: PHYSICAL THERAPY | Facility: CLINIC | Age: 35
End: 2020-02-20
Payer: COMMERCIAL

## 2020-02-20 DIAGNOSIS — M54.50 CHRONIC BILATERAL LOW BACK PAIN WITHOUT SCIATICA: ICD-10-CM

## 2020-02-20 DIAGNOSIS — M62.89 PELVIC FLOOR DYSFUNCTION IN FEMALE: Primary | ICD-10-CM

## 2020-02-20 DIAGNOSIS — G89.29 CHRONIC BILATERAL LOW BACK PAIN WITHOUT SCIATICA: ICD-10-CM

## 2020-02-20 PROCEDURE — 97140 MANUAL THERAPY 1/> REGIONS: CPT | Performed by: PHYSICAL THERAPIST

## 2020-02-20 PROCEDURE — 97112 NEUROMUSCULAR REEDUCATION: CPT | Performed by: PHYSICAL THERAPIST

## 2020-02-20 PROCEDURE — 97110 THERAPEUTIC EXERCISES: CPT | Performed by: PHYSICAL THERAPIST

## 2020-02-20 NOTE — PROGRESS NOTES
Daily Note     Today's date: 2020  Patient name: Ciera Steiner  : 1985  MRN: [id number]  Referring provider: Kam Turner DO  Dx:   Encounter Diagnosis     ICD-10-CM    1  Pelvic floor dysfunction in female M62 89    2  Chronic bilateral low back pain without sciatica M54 5     G89 29                   Subjective: Pt c/o flare-up in back pain and left glut two days ago with vacuuming and carrying her son  Objective: See treatment diary below      Assessment: Demonstrates challenge with maintaining neutral lumbar spine while in quadruped after 10 second hold  Notable muscle spasm in left QL, glut and left PF noted  Discussed possible benefits of pudendal nerve block due to persistent left side tail bone pain after 1 year postpartum  Encouraged pt to focus on core strengthening to continue to make gains with functional strength to perform ADLs  Patient would benefit from continued PT      Plan: Continue per plan of care           Precautions: PCOS  Focus on core strength, PF, and functional activities (lifting baby and box)    Daily Treatment Diary     Manual     PFM STM MONET            S/l PF stretch MONET         MONET   Sacral stretch in child's pose MONET            TPR to glut/IO [address]         MONET   Hip PROM          [address]       Exercise Diary     Bike              SKC             Piriformis stretch             PF Lift TA  5"x10         5"x10  With TA   TB rows             Child's pose arm tuck stretch (diagonal)             Seated Butterfly stretch supine PF stretch  10"x10            Hip Add Iso             TA+PF lift+hip add             SLR             DLS: Marching          5"x5 ea   Hip Abd s/l             S/l clamshells          5"x10 ea   Quadruped TA 5"x10            Quadruped leg ext             Sit to stand with TB hip abd             Mini squats             Seated QL stretch             TB Shld Ext  standing          nv                                 Modalities

## 2020-03-25 ENCOUNTER — PATIENT MESSAGE (OUTPATIENT)
Dept: PHYSICAL THERAPY | Facility: CLINIC | Age: 35
End: 2020-03-25

## 2020-04-13 ENCOUNTER — APPOINTMENT (OUTPATIENT)
Dept: PHYSICAL THERAPY | Facility: CLINIC | Age: 35
End: 2020-04-13
Payer: COMMERCIAL

## 2020-04-13 ENCOUNTER — TELEMEDICINE (OUTPATIENT)
Dept: PHYSICAL THERAPY | Facility: CLINIC | Age: 35
End: 2020-04-13
Payer: COMMERCIAL

## 2020-04-13 DIAGNOSIS — M54.50 CHRONIC MIDLINE LOW BACK PAIN WITHOUT SCIATICA: ICD-10-CM

## 2020-04-13 DIAGNOSIS — G89.29 CHRONIC MIDLINE LOW BACK PAIN WITHOUT SCIATICA: ICD-10-CM

## 2020-04-13 DIAGNOSIS — M62.89 PELVIC FLOOR DYSFUNCTION IN FEMALE: Primary | ICD-10-CM

## 2020-04-13 PROCEDURE — 97110 THERAPEUTIC EXERCISES: CPT | Performed by: PHYSICAL THERAPIST

## 2020-04-29 ENCOUNTER — TELEPHONE (OUTPATIENT)
Dept: ADMINISTRATIVE | Facility: OTHER | Age: 35
End: 2020-04-29

## 2020-04-29 ENCOUNTER — TELEMEDICINE (OUTPATIENT)
Dept: FAMILY MEDICINE CLINIC | Facility: CLINIC | Age: 35
End: 2020-04-29
Payer: COMMERCIAL

## 2020-04-29 VITALS
SYSTOLIC BLOOD PRESSURE: 124 MMHG | HEIGHT: 64 IN | TEMPERATURE: 95.5 F | DIASTOLIC BLOOD PRESSURE: 79 MMHG | BODY MASS INDEX: 30.22 KG/M2 | WEIGHT: 177 LBS

## 2020-04-29 DIAGNOSIS — Z00.00 ENCOUNTER FOR WELL ADULT EXAM WITHOUT ABNORMAL FINDINGS: Primary | ICD-10-CM

## 2020-04-29 PROCEDURE — 99395 PREV VISIT EST AGE 18-39: CPT | Performed by: FAMILY MEDICINE

## 2020-06-18 ENCOUNTER — TRANSCRIBE ORDERS (OUTPATIENT)
Dept: PHYSICAL THERAPY | Facility: CLINIC | Age: 35
End: 2020-06-18

## 2020-06-18 ENCOUNTER — EVALUATION (OUTPATIENT)
Dept: PHYSICAL THERAPY | Facility: CLINIC | Age: 35
End: 2020-06-18
Payer: COMMERCIAL

## 2020-06-18 DIAGNOSIS — M62.89 PELVIC FLOOR DYSFUNCTION IN FEMALE: Primary | ICD-10-CM

## 2020-06-18 DIAGNOSIS — M54.50 LOW BACK PAIN, UNSPECIFIED BACK PAIN LATERALITY, UNSPECIFIED CHRONICITY, UNSPECIFIED WHETHER SCIATICA PRESENT: ICD-10-CM

## 2020-06-18 DIAGNOSIS — M62.89 MYOFIBROSIS: Primary | ICD-10-CM

## 2020-06-18 DIAGNOSIS — G89.29 CHRONIC BILATERAL LOW BACK PAIN WITHOUT SCIATICA: ICD-10-CM

## 2020-06-18 DIAGNOSIS — M54.50 CHRONIC BILATERAL LOW BACK PAIN WITHOUT SCIATICA: ICD-10-CM

## 2020-06-18 PROCEDURE — 97164 PT RE-EVAL EST PLAN CARE: CPT | Performed by: PHYSICAL THERAPIST

## 2020-06-22 ENCOUNTER — OFFICE VISIT (OUTPATIENT)
Dept: PHYSICAL THERAPY | Facility: CLINIC | Age: 35
End: 2020-06-22
Payer: COMMERCIAL

## 2020-06-22 DIAGNOSIS — G89.29 CHRONIC BILATERAL LOW BACK PAIN WITHOUT SCIATICA: ICD-10-CM

## 2020-06-22 DIAGNOSIS — M54.50 CHRONIC BILATERAL LOW BACK PAIN WITHOUT SCIATICA: ICD-10-CM

## 2020-06-22 DIAGNOSIS — M62.89 PELVIC FLOOR DYSFUNCTION IN FEMALE: Primary | ICD-10-CM

## 2020-06-22 PROCEDURE — 97140 MANUAL THERAPY 1/> REGIONS: CPT | Performed by: PHYSICAL THERAPIST

## 2020-06-22 PROCEDURE — 97112 NEUROMUSCULAR REEDUCATION: CPT | Performed by: PHYSICAL THERAPIST

## 2020-06-29 ENCOUNTER — OFFICE VISIT (OUTPATIENT)
Dept: PHYSICAL THERAPY | Facility: CLINIC | Age: 35
End: 2020-06-29
Payer: COMMERCIAL

## 2020-06-29 DIAGNOSIS — M62.89 PELVIC FLOOR DYSFUNCTION IN FEMALE: Primary | ICD-10-CM

## 2020-06-29 DIAGNOSIS — M54.50 CHRONIC BILATERAL LOW BACK PAIN WITHOUT SCIATICA: ICD-10-CM

## 2020-06-29 DIAGNOSIS — G89.29 CHRONIC BILATERAL LOW BACK PAIN WITHOUT SCIATICA: ICD-10-CM

## 2020-06-29 PROCEDURE — 97140 MANUAL THERAPY 1/> REGIONS: CPT | Performed by: PHYSICAL THERAPIST

## 2020-06-29 PROCEDURE — 97112 NEUROMUSCULAR REEDUCATION: CPT | Performed by: PHYSICAL THERAPIST

## 2020-07-06 ENCOUNTER — OFFICE VISIT (OUTPATIENT)
Dept: PHYSICAL THERAPY | Facility: CLINIC | Age: 35
End: 2020-07-06
Payer: COMMERCIAL

## 2020-07-06 DIAGNOSIS — G89.29 CHRONIC BILATERAL LOW BACK PAIN WITHOUT SCIATICA: ICD-10-CM

## 2020-07-06 DIAGNOSIS — M62.89 PELVIC FLOOR DYSFUNCTION IN FEMALE: Primary | ICD-10-CM

## 2020-07-06 DIAGNOSIS — M54.50 CHRONIC BILATERAL LOW BACK PAIN WITHOUT SCIATICA: ICD-10-CM

## 2020-07-06 PROCEDURE — 97112 NEUROMUSCULAR REEDUCATION: CPT | Performed by: PHYSICAL THERAPIST

## 2020-07-06 PROCEDURE — 97140 MANUAL THERAPY 1/> REGIONS: CPT | Performed by: PHYSICAL THERAPIST

## 2020-07-06 NOTE — PROGRESS NOTES
Daily Note     Today's date: 2020  Patient name: Jose Angulo  : 1985  MRN: [id number]  Referring provider: Abran Kumar DO  Dx:   Encounter Diagnosis     ICD-10-CM    1  Pelvic floor dysfunction in female M62 89    2  Chronic bilateral low back pain without sciatica M54 5     G89 29                   Subjective: Pt reports no new complaints, but notes some tenderness in left glut upon arriving today after rushing to get her  Pt notes taking a 1 mile walk this morning  Objective: See treatment diary below      Assessment: Demonstrated good PFM contraction with TA with heel press and hip add, but required activating heel press before hip add to maintain PFM contraction  Patient able to perform TA marching lifting right leg with good left PFM contraction noted, but quickly fatigue in right PFM with lifting left leg  Patient would benefit from continued PT      Plan: Continue per plan of care        Precautions: PCOS  Focus on core strength, PF, and functional activities      Manuals  76         STM to PFM  [place]                                                Neuro Re-Ed             TA with hip add iso  5"x5  Left hip add only 5"x10  B/l hip add  With heel press 5"x10  Heel press --> B hip add         TA with hip abd iso             TA march  With heel press/elbow press    Right LE lift  5"x5  Left LE lift  1"x3         TA bridge             Diaphragmatic breathing   10x 2x10 2x10                                   Ther Ex             Happy Baby S   10"x1          Piriformis S   30"x1 ea                                                                                        Ther Activity                                       Gait Training                                       Modalities

## 2020-07-13 ENCOUNTER — OFFICE VISIT (OUTPATIENT)
Dept: PHYSICAL THERAPY | Facility: CLINIC | Age: 35
End: 2020-07-13
Payer: COMMERCIAL

## 2020-07-13 DIAGNOSIS — G89.29 CHRONIC BILATERAL LOW BACK PAIN WITHOUT SCIATICA: ICD-10-CM

## 2020-07-13 DIAGNOSIS — M54.50 CHRONIC BILATERAL LOW BACK PAIN WITHOUT SCIATICA: ICD-10-CM

## 2020-07-13 DIAGNOSIS — M62.89 PELVIC FLOOR DYSFUNCTION IN FEMALE: Primary | ICD-10-CM

## 2020-07-13 PROCEDURE — 97112 NEUROMUSCULAR REEDUCATION: CPT | Performed by: PHYSICAL THERAPIST

## 2020-07-13 PROCEDURE — 97140 MANUAL THERAPY 1/> REGIONS: CPT | Performed by: PHYSICAL THERAPIST

## 2020-07-13 NOTE — PROGRESS NOTES
Daily Note     Today's date: 2020  Patient name: Cal Guy  : 1985  MRN: [id number]  Referring provider: Linda Holland DO  Dx:   Encounter Diagnosis     ICD-10-CM    1  Pelvic floor dysfunction in female M62 89    2  Chronic bilateral low back pain without sciatica M54 5     G89 29                   Subjective: Pt notes onset of abdominal discomfort on Saturday which progressed to 8/10 pain in left upper quadrant on   Pt has notified her PCP and awaiting her appointment  Objective: See treatment diary below      Assessment: Performed TPR to left QL and oblique without change in left abdominal pain  Demonstrates mild-moderate muscle spasm in left PFM which subsided with STM, but pt still noted ttp in region  Encouraged pt to seek out assessment from PCP or specialist to determine source of abdominal pain  Patient would benefit from continued PT      Plan: Continue per plan of care        Precautions: PCOS  Focus on core strength, PF, and functional activities      Manuals         STM to PFM  [address]        TPR left QL/oblique     MONET                                  Neuro Re-Ed             TA with hip add iso  5"x5  Left hip add only 5"x10  B/l hip add  With heel press 5"x10  Heel press --> B hip add 5"x10  Heel --> B hip add        TA with hip abd iso             TA march  With heel press/elbow press    Right LE lift  5"x5  Left LE lift  1"x3         TA bridge             Diaphragmatic breathing   10x 2x10 2x10 10x                                  Ther Ex             Happy Baby S   10"x1  10"x3        Piriformis S   30"x1 ea                                                                                        Ther Activity                                       Gait Training                                       Modalities

## 2020-07-14 ENCOUNTER — OFFICE VISIT (OUTPATIENT)
Dept: FAMILY MEDICINE CLINIC | Facility: CLINIC | Age: 35
End: 2020-07-14
Payer: COMMERCIAL

## 2020-07-14 VITALS
RESPIRATION RATE: 16 BRPM | SYSTOLIC BLOOD PRESSURE: 116 MMHG | HEART RATE: 112 BPM | TEMPERATURE: 99.4 F | DIASTOLIC BLOOD PRESSURE: 80 MMHG | OXYGEN SATURATION: 99 % | BODY MASS INDEX: 29.6 KG/M2 | WEIGHT: 173.4 LBS | HEIGHT: 64 IN

## 2020-07-14 DIAGNOSIS — R10.2 CHRONIC PELVIC PAIN IN FEMALE: Primary | ICD-10-CM

## 2020-07-14 DIAGNOSIS — N91.2 AMENORRHEA: ICD-10-CM

## 2020-07-14 DIAGNOSIS — G89.29 CHRONIC PELVIC PAIN IN FEMALE: Primary | ICD-10-CM

## 2020-07-14 DIAGNOSIS — R10.84 ABDOMINAL PAIN, ACUTE, GENERALIZED: ICD-10-CM

## 2020-07-14 DIAGNOSIS — R11.0 CHRONIC NAUSEA: ICD-10-CM

## 2020-07-14 PROCEDURE — 1036F TOBACCO NON-USER: CPT | Performed by: FAMILY MEDICINE

## 2020-07-14 PROCEDURE — 99214 OFFICE O/P EST MOD 30 MIN: CPT | Performed by: FAMILY MEDICINE

## 2020-07-14 PROCEDURE — 3008F BODY MASS INDEX DOCD: CPT | Performed by: FAMILY MEDICINE

## 2020-07-14 RX ORDER — MULTIVITAMIN
1 TABLET ORAL DAILY
COMMUNITY

## 2020-07-14 NOTE — PROGRESS NOTES
Assessment/Plan:     Diagnoses and all orders for this visit:    Chronic pelvic pain in female  -     CT abdomen pelvis w contrast; Future  -     CBC and differential; Future  -     Comprehensive metabolic panel; Future  -     Amylase; Future  -     Lipase; Future  -     Sedimentation rate, automated; Future    Abdominal pain, acute, generalized  -     CT abdomen pelvis w contrast; Future  -     CBC and differential; Future  -     Comprehensive metabolic panel; Future  -     Amylase; Future  -     Lipase; Future  -     Sedimentation rate, automated; Future    Chronic nausea  -     CBC and differential; Future  -     Comprehensive metabolic panel; Future  -     Amylase; Future  -     Lipase; Future  -     Sedimentation rate, automated; Future    Amenorrhea  -     CBC and differential; Future  -     Comprehensive metabolic panel; Future  -     Amylase; Future  -     Lipase; Future  -     Sedimentation rate, automated; Future    Other orders  -     Multiple Vitamin (MULTIVITAMIN) tablet;  Take 1 tablet by mouth daily      patient started 2 weeks ago with severe generalized abdominal pain  She is tender to palpation central through her left upper quadrant  She has been having chronic pelvic pain since childbirth  She recently had her IUD removed as that was causing her significant discomfort  She has been doing physical therapy for chronic pelvic pain  He has had an ultrasound of her lower pelvis in the past but no other imaging  Will order belly labs on her as her differential includes pancreatitis  A small-bowel obstruction  Or potential mass  She also has chronic nausea as well as fatigue and also has not had a menstrual period in a very long time  She will follow-up after her workup      Subjective:     Chief Complaint   Patient presents with    Abdominal Pain     Left lower abdominal pain since 07/04/2020 (Patient reports that she has something simlar this past winter, but it wasn't as bad)        Patient ID: Jin Parish is a 29 y o  female  Patient states she had a long episode debilitating abdominal pain over the past 1-2 weeks  She states there was no specific on set but she almost went to the emergency room doubled over in pain over the weekend  She is still very tender today to touch  She has had some chronic nausea issues  She is still being treated for chronic pelvic floor issues by physical therapy  Infusions still gyn and had her IUD removed due to worsening pain  She has nausea with these symptoms  She states her bowels have been okay  She is  to palpation      The following portions of the patient's history were reviewed and updated as appropriate: allergies, current medications, past family history, past medical history, past social history, past surgical history and problem list     Review of Systems   Constitutional: Negative  HENT: Negative  Eyes: Negative  Respiratory: Negative  Cardiovascular: Negative  Gastrointestinal: Negative  Endocrine: Negative  Genitourinary: Negative  Musculoskeletal: Negative  Skin: Negative  Allergic/Immunologic: Negative  Neurological: Negative  Hematological: Negative  Psychiatric/Behavioral: Negative  All other systems reviewed and are negative  Objective:    Vitals:    07/14/20 0850   BP: 116/80   BP Location: Right arm   Patient Position: Sitting   Cuff Size: Standard   Pulse: (!) 112   Resp: 16   Temp: 99 4 °F (37 4 °C)   TempSrc: Tympanic   SpO2: 99%   Weight: 78 7 kg (173 lb 6 4 oz)   Height: 5' 3 75" (1 619 m)          Physical Exam   Constitutional: She is oriented to person, place, and time  She appears well-developed and well-nourished  HENT:   Head: Normocephalic and atraumatic  Right Ear: External ear normal    Left Ear: External ear normal    Mouth/Throat: Oropharynx is clear and moist    Eyes: Pupils are equal, round, and reactive to light   Conjunctivae and EOM are normal    Neck: Normal range of motion  Cardiovascular: Normal rate, regular rhythm and normal heart sounds  Pulmonary/Chest: Effort normal and breath sounds normal    Abdominal: Soft  She exhibits distension  There is tenderness  There is no rebound  Bowel sounds decreased  Musculoskeletal: Normal range of motion  Neurological: She is alert and oriented to person, place, and time  She has normal reflexes  Skin: Skin is warm and dry  Psychiatric: She has a normal mood and affect  Her behavior is normal  Judgment and thought content normal    Nursing note and vitals reviewed

## 2020-07-15 LAB
ALBUMIN SERPL-MCNC: 4.5 G/DL (ref 3.6–5.1)
ALBUMIN/GLOB SERPL: 1.6 (CALC) (ref 1–2.5)
ALP SERPL-CCNC: 79 U/L (ref 31–125)
ALT SERPL-CCNC: 28 U/L (ref 6–29)
AMYLASE SERPL-CCNC: 78 U/L (ref 21–101)
AST SERPL-CCNC: 14 U/L (ref 10–30)
BASOPHILS # BLD AUTO: 47 CELLS/UL (ref 0–200)
BASOPHILS NFR BLD AUTO: 0.5 %
BILIRUB SERPL-MCNC: 0.5 MG/DL (ref 0.2–1.2)
BUN SERPL-MCNC: 13 MG/DL (ref 7–25)
BUN/CREAT SERPL: NORMAL (CALC) (ref 6–22)
CALCIUM SERPL-MCNC: 9.8 MG/DL (ref 8.6–10.2)
CHLORIDE SERPL-SCNC: 105 MMOL/L (ref 98–110)
CO2 SERPL-SCNC: 29 MMOL/L (ref 20–32)
CREAT SERPL-MCNC: 1.04 MG/DL (ref 0.5–1.1)
EOSINOPHIL # BLD AUTO: 376 CELLS/UL (ref 15–500)
EOSINOPHIL NFR BLD AUTO: 4 %
ERYTHROCYTE [DISTWIDTH] IN BLOOD BY AUTOMATED COUNT: 12.9 % (ref 11–15)
ERYTHROCYTE [SEDIMENTATION RATE] IN BLOOD BY WESTERGREN METHOD: 6 MM/H
GLOBULIN SER CALC-MCNC: 2.9 G/DL (CALC) (ref 1.9–3.7)
GLUCOSE SERPL-MCNC: 104 MG/DL (ref 65–139)
HCT VFR BLD AUTO: 42.6 % (ref 35–45)
HGB BLD-MCNC: 14.2 G/DL (ref 11.7–15.5)
LIPASE SERPL-CCNC: 58 U/L (ref 7–60)
LYMPHOCYTES # BLD AUTO: 2566 CELLS/UL (ref 850–3900)
LYMPHOCYTES NFR BLD AUTO: 27.3 %
MCH RBC QN AUTO: 29.2 PG (ref 27–33)
MCHC RBC AUTO-ENTMCNC: 33.3 G/DL (ref 32–36)
MCV RBC AUTO: 87.5 FL (ref 80–100)
MONOCYTES # BLD AUTO: 733 CELLS/UL (ref 200–950)
MONOCYTES NFR BLD AUTO: 7.8 %
NEUTROPHILS # BLD AUTO: 5678 CELLS/UL (ref 1500–7800)
NEUTROPHILS NFR BLD AUTO: 60.4 %
PLATELET # BLD AUTO: 240 THOUSAND/UL (ref 140–400)
PMV BLD REES-ECKER: 11.7 FL (ref 7.5–12.5)
POTASSIUM SERPL-SCNC: 3.9 MMOL/L (ref 3.5–5.3)
PROT SERPL-MCNC: 7.4 G/DL (ref 6.1–8.1)
RBC # BLD AUTO: 4.87 MILLION/UL (ref 3.8–5.1)
SL AMB EGFR AFRICAN AMERICAN: 81 ML/MIN/1.73M2
SL AMB EGFR NON AFRICAN AMERICAN: 70 ML/MIN/1.73M2
SODIUM SERPL-SCNC: 140 MMOL/L (ref 135–146)
WBC # BLD AUTO: 9.4 THOUSAND/UL (ref 3.8–10.8)

## 2020-07-20 ENCOUNTER — APPOINTMENT (OUTPATIENT)
Dept: PHYSICAL THERAPY | Facility: CLINIC | Age: 35
End: 2020-07-20
Payer: COMMERCIAL

## 2020-07-20 ENCOUNTER — HOSPITAL ENCOUNTER (OUTPATIENT)
Dept: CT IMAGING | Facility: HOSPITAL | Age: 35
Discharge: HOME | End: 2020-07-20
Payer: COMMERCIAL

## 2020-07-20 DIAGNOSIS — R10.84 ABDOMINAL PAIN, ACUTE, GENERALIZED: ICD-10-CM

## 2020-07-20 DIAGNOSIS — G89.29 CHRONIC PELVIC PAIN IN FEMALE: ICD-10-CM

## 2020-07-20 DIAGNOSIS — R10.2 CHRONIC PELVIC PAIN IN FEMALE: ICD-10-CM

## 2020-07-20 PROCEDURE — 74177 CT ABD & PELVIS W/CONTRAST: CPT

## 2020-07-20 RX ADMIN — IOHEXOL 100 ML: 350 INJECTION, SOLUTION INTRAVENOUS at 13:56

## 2020-07-23 ENCOUNTER — TELEPHONE (OUTPATIENT)
Dept: FAMILY MEDICINE CLINIC | Facility: CLINIC | Age: 35
End: 2020-07-23

## 2020-07-23 NOTE — TELEPHONE ENCOUNTER
* I spoke with this patient yesterday - she'd called the results line requesting the resutls  She is already aware that we do not yet have her results back, but that we'll call and confirm with her once we do

## 2020-07-23 NOTE — TELEPHONE ENCOUNTER
This can wait until Dr Page is back   I sent message to patient [[email or web address]] : health maintenance visit  [de-identified] : 51 year-old male patient with history of HCC s/p embolization, currently on liver transplant list, DMII , presenting for follow-up. Currently asymptomatic. Patient came at sons request (Servando ) for health maintenance visit. Son endorsed patient seeing an oncologist at [facility] but doesn’t remember the name and hasn’t been following up.  Patient is hard of hearing and uses sign language to communicate, translated by daughter.

## 2020-07-23 NOTE — TELEPHONE ENCOUNTER
----- Message from Nan Sanchez sent at 7/22/2020  2:13 PM EDT -----  Regarding: Test Results Question  Contact: [phone]  Please send CT results to Dr Terry North [facility]  [facility] at [address]  [address] [place] [address],[address]  [address] [facility], [address] [place] [address],[address] Fax [phone]    Thank you, Nan Sanchez

## 2020-07-23 NOTE — TELEPHONE ENCOUNTER
Amara Eason TO get results of her CT scan, she would like to get them before Dr Vivek Samuel gets back also she would like them posted in [address] [place] [address]

## 2020-07-24 ENCOUNTER — TELEPHONE (OUTPATIENT)
Dept: FAMILY MEDICINE CLINIC | Facility: CLINIC | Age: 35
End: 2020-07-24

## 2020-07-24 NOTE — TELEPHONE ENCOUNTER
I sent patient a message on my chart that this will be done when Dr Bhandari gets back from vacation

## 2020-07-24 NOTE — TELEPHONE ENCOUNTER
PT CALLING FOR CT   RESULTS  PT HAD AT [facility] QT CALL [phone] ANYTIME  PT HAD CT 07/20/20 SHE KNOWS DR WASSERMAN IS ON VACATION SHE ALSO HAS LOOKED IN MY CHART BUT ITS NOT THERE

## 2020-07-27 ENCOUNTER — APPOINTMENT (OUTPATIENT)
Dept: PHYSICAL THERAPY | Facility: CLINIC | Age: 35
End: 2020-07-27
Payer: COMMERCIAL

## 2020-08-03 ENCOUNTER — OFFICE VISIT (OUTPATIENT)
Dept: PHYSICAL THERAPY | Facility: CLINIC | Age: 35
End: 2020-08-03
Payer: COMMERCIAL

## 2020-08-03 DIAGNOSIS — G89.29 CHRONIC BILATERAL LOW BACK PAIN WITHOUT SCIATICA: ICD-10-CM

## 2020-08-03 DIAGNOSIS — M62.89 PELVIC FLOOR DYSFUNCTION IN FEMALE: Primary | ICD-10-CM

## 2020-08-03 DIAGNOSIS — M54.50 CHRONIC BILATERAL LOW BACK PAIN WITHOUT SCIATICA: ICD-10-CM

## 2020-08-03 PROCEDURE — 97140 MANUAL THERAPY 1/> REGIONS: CPT | Performed by: PHYSICAL THERAPIST

## 2020-08-03 PROCEDURE — 97112 NEUROMUSCULAR REEDUCATION: CPT | Performed by: PHYSICAL THERAPIST

## 2020-08-03 NOTE — PROGRESS NOTES
Daily Note     Today's date: 8/3/2020  Patient name: Jason Rudolph  : 1985  MRN: [id number]  Referring provider: Micheal Harris DO  Dx:   Encounter Diagnosis     ICD-10-CM    1  Pelvic floor dysfunction in female  M62 89    2  Chronic bilateral low back pain without sciatica  M54 5     G89 29                   Subjective: Pt saw a urogynecologist specialist due to chronic pelvic pain  Pt was given a vaginal muscle relaxtor medication to address pelvic floor spasm  Pt also encouraged to continue PT and increase to 2x/week if possible for manual therapy  Objective: See treatment diary below      Assessment: Demonstrates return of muscle spasm in pelvic floor with greater ttp on left compared to right side  Pt was able to active right PFM with TA/left heel press/Right elbow press/hip adduction iso, but fatigued after 4 reps  Pt unable to perform exercise on left side without core instability noted  Demonstrated return in muscle spasm in PFM following exercise  Patient would benefit from continued PT      Plan: Continue per plan of care        Precautions: PCOS  Focus on core strength, PF, and functional activities      Manuals 6/18 6/22 6/29 7/6 7/13 8/3       STM to PFM  [address]       TPR left QL/oblique     [address]                                  Neuro Re-Ed             TA with hip add iso  5"x5  Left hip add only 5"x10  B/l hip add  With heel press 5"x10  Heel press --> B hip add 5"x10  Heel --> B hip add 5"x5  Heel --> B hip add       TA with hip abd iso             TA march  With heel press/elbow press    Right LE lift  5"x5  Left LE lift  1"x3         TA bridge             Diaphragmatic breathing   10x 2x10 2x10 10x                                  Ther Ex             Happy Baby S   10"x1  10"x3        Piriformis S   30"x1 ea                                                                                        Ther Activity                                       Gait Training Modalities

## 2020-08-10 ENCOUNTER — OFFICE VISIT (OUTPATIENT)
Dept: PHYSICAL THERAPY | Facility: CLINIC | Age: 35
End: 2020-08-10
Payer: COMMERCIAL

## 2020-08-10 DIAGNOSIS — G89.29 CHRONIC BILATERAL LOW BACK PAIN WITHOUT SCIATICA: ICD-10-CM

## 2020-08-10 DIAGNOSIS — M62.89 PELVIC FLOOR DYSFUNCTION IN FEMALE: Primary | ICD-10-CM

## 2020-08-10 DIAGNOSIS — M54.50 CHRONIC BILATERAL LOW BACK PAIN WITHOUT SCIATICA: ICD-10-CM

## 2020-08-10 PROCEDURE — 97140 MANUAL THERAPY 1/> REGIONS: CPT | Performed by: PHYSICAL THERAPIST

## 2020-08-10 PROCEDURE — 97112 NEUROMUSCULAR REEDUCATION: CPT | Performed by: PHYSICAL THERAPIST

## 2020-08-10 NOTE — PROGRESS NOTES
Daily Note     Today's date: 8/10/2020  Patient name: Renay Britt  : 1985  MRN: [id number]  Referring provider: Jason Franco DO  Dx:   Encounter Diagnosis     ICD-10-CM    1  Pelvic floor dysfunction in female  M62 89    2  Chronic bilateral low back pain without sciatica  M54 5     G89 29                   Subjective: Pt c/o left nerve pain along tail bone returning over the weekend  Objective: See treatment diary below      Assessment: Demonstrate improved left PFM relaxation following contraction when cued to hip abd iso  Attempted bridges, but demonstrated spasm in left PFM requiring manual release  Focused on left glut contraction with hip abd iso/TA/left heel press, which prevented muscle spasm in left PFM  Instructed pt to prioritize these 2 exercises at home daily  Patient would benefit from continued PT      Plan: Continue per plan of care        Precautions: PCOS  Focus on core strength, PF, and functional activities      Manuals 6/18 6/22 6/29 7/6 7/13 8/3 8/10      STM to PFM  [address]      TPR left QL/oblique     MONET                                  Neuro Re-Ed             TA with hip add iso  5"x5  Left hip add only 5"x10  B/l hip add  With heel press 5"x10  Heel press --> B hip add 5"x10  Heel --> B hip add 5"x5  Heel --> B hip add       TA with hip abd iso             TA march  With heel press/elbow press    Right LE lift  5"x5  Left LE lift  1"x3         TA bridge             Diaphragmatic breathing   10x 2x10 2x10 10x  throughout tx      Hip abd Iso -> PF lift       5"x10      Hip abd iso -> TA -> left heel press       5"x10      Ther Ex             Happy Baby S   10"x1  10"x3        Piriformis S   30"x1 ea                                                                                        Ther Activity                                       Gait Training                                       Modalities

## 2020-08-20 ENCOUNTER — EVALUATION (OUTPATIENT)
Dept: PHYSICAL THERAPY | Facility: CLINIC | Age: 35
End: 2020-08-20
Payer: COMMERCIAL

## 2020-08-20 DIAGNOSIS — G89.29 CHRONIC BILATERAL LOW BACK PAIN WITHOUT SCIATICA: ICD-10-CM

## 2020-08-20 DIAGNOSIS — M54.50 CHRONIC BILATERAL LOW BACK PAIN WITHOUT SCIATICA: ICD-10-CM

## 2020-08-20 DIAGNOSIS — M62.89 PELVIC FLOOR DYSFUNCTION IN FEMALE: Primary | ICD-10-CM

## 2020-08-20 PROCEDURE — 97140 MANUAL THERAPY 1/> REGIONS: CPT | Performed by: PHYSICAL THERAPIST

## 2020-08-20 PROCEDURE — 97110 THERAPEUTIC EXERCISES: CPT | Performed by: PHYSICAL THERAPIST

## 2020-08-20 NOTE — PROGRESS NOTES
PT Re-Evaluation     Today's date: 2020  Patient name: Rosie Clayton  : 1985  MRN: [id number]  Referring provider: Lang Webb DO  Dx:   Encounter Diagnosis     ICD-10-CM    1  Pelvic floor dysfunction in female  M62 89    2  Chronic bilateral low back pain without sciatica  M54 5     G89 29                   Assessment  Assessment details: Patient continues to experience left side glut/tail bone pain, but no longer experiencing constant back pain and can tolerate sitting on supportive chairs without increase in tail bone pain  Pt still unable to tolerate sitting on hard surfaces and notes pain with giving child a bath and heavy chores  Pt has seen specialist for chronic pelvic pain and was encouraged to continue PT  Pt notes improvement in tail bone pain with starting acupuncture last week  Demonstrates increase in LE strength, but demonstrates sway back posture affecting lumbar spine and tail bone position  Severe ttp and muscle spasm noted in left iliopoas and reproduced pain in left side of coccyx  Unable to assess PFM strength due to time constraints  Patient would benefit from further PT to decrease pain, improve postural control, and maximize functional capacity  Impairments: abnormal muscle tone, abnormal or restricted ROM, activity intolerance, impaired physical strength, lacks appropriate home exercise program, pain with function and poor posture     Goals  Impairment Goals in 8 weeks:  1  Improve MMT for pelvic floor to greater than or equal to 2+/5 in order to improve lumbopelvic stability - not met  2  Decrease PFM muscle tension to minimal tenderness - not met  3  Increase prone knee flexion to 130* L without back pain - not met    Functional Goals in 8 weeks:  1  Patient is able to ambulate community distances pushing stroller without back pain - partially met  2  Patient is able to sit on firm chair without left glut pain - not met  3   Patient is able to cough without leakage of urine - not met  4  Patient is able to kneel and bathe her baby in bathtub without back pain - not met    Plan  Patient would benefit from: women's health eval and skilled physical therapy  Planned modality interventions: biofeedback  Planned therapy interventions: manual therapy, neuromuscular re-education, patient education, self care, strengthening, stretching, home exercise program and behavior modification  Frequency: 2x week  Duration in weeks: 8  Plan of Care expiration date: 10/15/2020  Treatment plan discussed with: patient        Subjective Evaluation    History of Present Illness  Mechanism of injury: Patient notes improvement in pain in left glut following acupuncture tx twice last week  Pt still notes pain is aggravated by kneeling, bending, sitting on hard surfaces  Pain  At best pain ratin  At worst pain ratin  Location: Left glut  Quality: dull ache and tight    Patient Goals  Patient goals for therapy: decreased pain, increased strength, independence with ADLs/IADLs and return to work          Objective     Active Range of Motion     Lumbar   Flexion:  Restriction level: minimal  Extension:  Restriction level: moderate  Left lateral flexion:  Restriction level: moderate  Right lateral flexion:  Restriction level: moderate    Additional Active Range of Motion Details  Sway back posture with APT    Decreased mobility noted in lumbar spine with standing trunk movements  Hypermobility in thoracolumbar junction with trunk extension and lateral flexion b/l  Passive SLR: 74* R, 84* L  Prone knee flexion: 130* R (minimal quad pull), 120* L (pulling in quad and back pain)    Palpations: Moderate ttp and muscle spasm in left glut, QL/multifidus, and L4-L5    Mild ttp and muscle spasm in right glut  Mild ttp and muscle spasm along mid thoracic pvm    Strength/Myotome Testing     Left Hip   Planes of Motion   Flexion: 4+  Extension: 4-  Abduction: 4+  Adduction: 3+ (pain in groin)  External rotation: 4+  Internal rotation: 4    Right Hip   Planes of Motion   Flexion: 4+  Extension: 4  Abduction: 4+  Adduction: 4-  External rotation: 4+  Internal rotation: 4    Left Knee   Flexion: 4+  Extension: 4+    Right Knee   Flexion: 4+  Extension: 4+    Left Ankle/Foot   Dorsiflexion: 4+    Right Ankle/Foot   Dorsiflexion: 4+    Additional Strength Details  Modified plank fatigue after 5 seconds    Palpation: severe ttp in left iliopsoas muscle and hip flexor tendon  Mild ttp in right iliopsoas muscle and hip flexor tendon     Hip extension PROM: 15* R (pulling in left groin), 15* L (left tail bone pain)             Precautions: PCOS  Focus on core strength, PF, and functional activities      Manuals 6/18 6/22 6/29 7/6 7/13 8/3 8/10 8/20     STM to PFM  [address] [place] [address]     TPR left QL/oblique     MONET        Assessment        Performed     TPR of left iliopsoas         MONET     Neuro Re-Ed             TA with hip add iso  5"x5  Left hip add only 5"x10  B/l hip add  With heel press 5"x10  Heel press --> B hip add 5"x10  Heel --> B hip add 5"x5  Heel --> B hip add       TA with hip abd iso             TA march  With heel press/elbow press    Right LE lift  5"x5  Left LE lift  1"x3         TA bridge             Diaphragmatic breathing   10x 2x10 2x10 10x  throughout tx      Hip abd Iso -> PF lift       5"x10      Hip abd iso -> TA -> left heel press       5"x10      Ther Ex             Happy Baby S   10"x1  10"x3        Piriformis S   30"x1 ea                                                                                        Ther Activity                                       Gait Training                                       Modalities

## 2020-08-20 NOTE — LETTER
2020    Kusum Kearns [address]    Patient: Anjel Malcolm   YOB: 1985   Date of Visit: 2020     Encounter Diagnosis     ICD-10-CM    1  Pelvic floor dysfunction in female  M62 89    2  Chronic bilateral low back pain without sciatica  M54 5     G89 29        Dear Dr Diane Maldonado:    Thank you for your recent referral of Anjel Malcolm  Please review the attached evaluation summary from Laurren's recent visit  Please verify that you agree with the plan of care by signing the attached order  If you have any questions or concerns, please do not hesitate to call  I sincerely appreciate the opportunity to share in the care of one of your patients and hope to have another opportunity to work with you in the near future  Sincerely,    Sophie Barajas, PT      Referring Provider:      I certify that I have read the below Plan of Care and certify the need for these services furnished under this plan of treatment while under my care  Kusum Kearns DO  [address]: [phone]          PT Re-Evaluation     Today's date: 2020  Patient name: Anjel Malcolm  : 1985  MRN: [id number]  Referring provider: Danya Alfred DO  Dx:   Encounter Diagnosis     ICD-10-CM    1  Pelvic floor dysfunction in female  M62 89    2  Chronic bilateral low back pain without sciatica  M54 5     G89 29                   Assessment  Assessment details: Patient continues to experience left side glut/tail bone pain, but no longer experiencing constant back pain and can tolerate sitting on supportive chairs without increase in tail bone pain  Pt still unable to tolerate sitting on hard surfaces and notes pain with giving child a bath and heavy chores  Pt has seen specialist for chronic pelvic pain and was encouraged to continue PT   Pt notes improvement in tail bone pain with starting acupuncture last week  Demonstrates increase in LE strength, but demonstrates sway back posture affecting lumbar spine and tail bone position  Severe ttp and muscle spasm noted in left iliopoas and reproduced pain in left side of coccyx  Unable to assess PFM strength due to time constraints  Patient would benefit from further PT to decrease pain, improve postural control, and maximize functional capacity  Impairments: abnormal muscle tone, abnormal or restricted ROM, activity intolerance, impaired physical strength, lacks appropriate home exercise program, pain with function and poor posture     Goals  Impairment Goals in 8 weeks:  1  Improve MMT for pelvic floor to greater than or equal to 2+/5 in order to improve lumbopelvic stability - not met  2  Decrease PFM muscle tension to minimal tenderness - not met  3  Increase prone knee flexion to 130* L without back pain - not met    Functional Goals in 8 weeks:  1  Patient is able to ambulate community distances pushing stroller without back pain - partially met  2  Patient is able to sit on firm chair without left glut pain - not met  3  Patient is able to cough without leakage of urine - not met  4  Patient is able to kneel and bathe her baby in bathtub without back pain - not met    Plan  Patient would benefit from: women's health eval and skilled physical therapy  Planned modality interventions: biofeedback  Planned therapy interventions: manual therapy, neuromuscular re-education, patient education, self care, strengthening, stretching, home exercise program and behavior modification  Frequency: 2x week  Duration in weeks: 8  Plan of Care expiration date: 10/15/2020  Treatment plan discussed with: patient        Subjective Evaluation    History of Present Illness  Mechanism of injury: Patient notes improvement in pain in left glut following acupuncture tx twice last week   Pt still notes pain is aggravated by kneeling, bending, sitting on hard surfaces  Pain  At best pain ratin  At worst pain ratin  Location: Left glut  Quality: dull ache and tight    Patient Goals  Patient goals for therapy: decreased pain, increased strength, independence with ADLs/IADLs and return to work          Objective     Active Range of Motion     Lumbar   Flexion:  Restriction level: minimal  Extension:  Restriction level: moderate  Left lateral flexion:  Restriction level: moderate  Right lateral flexion:  Restriction level: moderate    Additional Active Range of Motion Details  Sway back posture with APT    Decreased mobility noted in lumbar spine with standing trunk movements  Hypermobility in thoracolumbar junction with trunk extension and lateral flexion b/l  Passive SLR: 74* R, 84* L  Prone knee flexion: 130* R (minimal quad pull), 120* L (pulling in quad and back pain)    Palpations: Moderate ttp and muscle spasm in left glut, QL/multifidus, and L4-L5    Mild ttp and muscle spasm in right glut  Mild ttp and muscle spasm along mid thoracic pvm    Strength/Myotome Testing     Left Hip   Planes of Motion   Flexion: 4+  Extension: 4-  Abduction: 4+  Adduction: 3+ (pain in groin)  External rotation: 4+  Internal rotation: 4    Right Hip   Planes of Motion   Flexion: 4+  Extension: 4  Abduction: 4+  Adduction: 4-  External rotation: 4+  Internal rotation: 4    Left Knee   Flexion: 4+  Extension: 4+    Right Knee   Flexion: 4+  Extension: 4+    Left Ankle/Foot   Dorsiflexion: 4+    Right Ankle/Foot   Dorsiflexion: 4+    Additional Strength Details  Modified plank fatigue after 5 seconds    Palpation: severe ttp in left iliopsoas muscle and hip flexor tendon  Mild ttp in right iliopsoas muscle and hip flexor tendon     Hip extension PROM: 15* R (pulling in left groin), 15* L (left tail bone pain)             Precautions: PCOS  Focus on core strength, PF, and functional activities      Manuals  8/3 8/10 8/20     [facility] to PF  [address] [place] [address] TPR left QL/oblique     MONET        Assessment        Performed     TPR of left iliopsoas         MONET     Neuro Re-Ed             TA with hip add iso  5"x5  Left hip add only 5"x10  B/l hip add  With heel press 5"x10  Heel press --> B hip add 5"x10  Heel --> B hip add 5"x5  Heel --> B hip add       TA with hip abd iso             TA march  With heel press/elbow press    Right LE lift  5"x5  Left LE lift  1"x3         TA bridge             Diaphragmatic breathing   10x 2x10 2x10 10x  throughout tx      Hip abd Iso -> PF lift       5"x10      Hip abd iso -> TA -> left heel press       5"x10      Ther Ex             Happy Baby S   10"x1  10"x3        Piriformis S   30"x1 ea                                                                                        Ther Activity                                       Gait Training                                       Modalities

## 2020-08-27 ENCOUNTER — OFFICE VISIT (OUTPATIENT)
Dept: PHYSICAL THERAPY | Facility: CLINIC | Age: 35
End: 2020-08-27
Payer: COMMERCIAL

## 2020-08-27 DIAGNOSIS — M54.50 CHRONIC BILATERAL LOW BACK PAIN WITHOUT SCIATICA: ICD-10-CM

## 2020-08-27 DIAGNOSIS — M62.89 PELVIC FLOOR DYSFUNCTION IN FEMALE: Primary | ICD-10-CM

## 2020-08-27 DIAGNOSIS — G89.29 CHRONIC BILATERAL LOW BACK PAIN WITHOUT SCIATICA: ICD-10-CM

## 2020-08-27 PROCEDURE — 97140 MANUAL THERAPY 1/> REGIONS: CPT | Performed by: PHYSICAL THERAPIST

## 2020-08-27 PROCEDURE — 97110 THERAPEUTIC EXERCISES: CPT | Performed by: PHYSICAL THERAPIST

## 2020-08-27 PROCEDURE — 97112 NEUROMUSCULAR REEDUCATION: CPT | Performed by: PHYSICAL THERAPIST

## 2020-08-27 NOTE — PROGRESS NOTES
Daily Note     Today's date: 2020  Patient name: Mihir Fitzpatrick  : 1985  MRN: [id number]  Referring provider: Tim Petersen DO  Dx:   Encounter Diagnosis     ICD-10-CM    1  Pelvic floor dysfunction in female  M62 89    2  Chronic bilateral low back pain without sciatica  M54 5     G89 29                   Subjective: Patient reports no relief with vaginal muscle relaxer  Pt notes relief in tailbone pain for a few days following acupuncture  Pt notes back/ tailbone pain is more central instead on left side  Pt admits she has not perform hip abd iso exercises  Objective: See treatment diary below      Assessment: Pt noted return of left side tailbone pain and increase in lower back pain with reps of hip abd and PF lift exercises  Attempted PFM stretches, but pt unable to relief left side muscle pain  Performed manual STM on left PFM  Patient would benefit from continued PT      Plan: Continue per plan of care        Precautions: PCOS  Focus on core strength, PF, and functional activities      Manuals 6/18 6/22 6/29 7/6 7/13 8/3 8/10 8/20 8/27    STM to PFM  [address] [address]    TPR left QL/oblique     [address]        Assessment        Performed     TPR of left iliopsoas         MONET     Neuro Re-Ed             TA with hip add iso  5"x5  Left hip add only 5"x10  B/l hip add  With heel press 5"x10  Heel press --> B hip add 5"x10  Heel --> B hip add 5"x5  Heel --> B hip add       TA with hip abd iso             TA march  With heel press/elbow press    Right LE lift  5"x5  Left LE lift  1"x3         TA bridge             Diaphragmatic breathing   10x 2x10 2x10 10x  throughout tx  throughout tx    Hip abd Iso -> PF lift       5"x10  5"x10    Hip abd iso -> TA -> left heel press       5"x10      Ther Ex             Happy Baby S   10"x1  10"x3    10"x3    Piriformis S   30"x1 ea          [place] S         10"x5                                                                     Ther Activity Gait Training                                       Modalities

## 2020-08-31 ENCOUNTER — OFFICE VISIT (OUTPATIENT)
Dept: PHYSICAL THERAPY | Facility: CLINIC | Age: 35
End: 2020-08-31
Payer: COMMERCIAL

## 2020-08-31 DIAGNOSIS — M62.89 PELVIC FLOOR DYSFUNCTION IN FEMALE: Primary | ICD-10-CM

## 2020-08-31 DIAGNOSIS — M54.50 CHRONIC BILATERAL LOW BACK PAIN WITHOUT SCIATICA: ICD-10-CM

## 2020-08-31 DIAGNOSIS — G89.29 CHRONIC BILATERAL LOW BACK PAIN WITHOUT SCIATICA: ICD-10-CM

## 2020-08-31 PROCEDURE — 97112 NEUROMUSCULAR REEDUCATION: CPT | Performed by: PHYSICAL THERAPIST

## 2020-08-31 PROCEDURE — 97140 MANUAL THERAPY 1/> REGIONS: CPT | Performed by: PHYSICAL THERAPIST

## 2020-08-31 PROCEDURE — 97110 THERAPEUTIC EXERCISES: CPT | Performed by: PHYSICAL THERAPIST

## 2020-08-31 NOTE — PROGRESS NOTES
Daily Note     Today's date: 2020  Patient name: Francoise Chow  : 1985  MRN: [id number]  Referring provider: Mike Vicente DO  Dx:   Encounter Diagnosis     ICD-10-CM    1  Pelvic floor dysfunction in female  M62 89    2  Chronic bilateral low back pain without sciatica  M54 5     G89 29                   Subjective: Pt reports left tailbone pain produced from last visit subsided within 20 minutes  Pt admits she did not perform strengthening exercises at home since last visit, but has been prioritizing the stretches  Objective: See treatment diary below      Assessment: Pt tolerated hip abd iso/TA without increase in tailbone pain  Pt noted mild increase in left glut pain with added heel press  Pt noted pain subsided with stretches, but central tailbone pain persisted  Following TPR to left iliopoas, pt noted decrease in central tailbone pain  Demonstrated less muscle spasm and tenderness in left PFM, but tension noted in right PFM  Patient would benefit from continued PT      Plan: Continue per plan of care        Precautions: PCOS  Focus on core strength, PF, and functional activities      Manuals 6/18 6/22 6/29 7/6 7/13 8/3 8/10 8/20 8/27 8/31   STM to PFM  [address]   TPR left QL/oblique     [address]        Assessment        Performed     TPR of left iliopsoas         [address]  MONET   Neuro Re-Ed             TA with hip add iso  5"x5  Left hip add only 5"x10  B/l hip add  With heel press 5"x10  Heel press --> B hip add 5"x10  Heel --> B hip add 5"x5  Heel --> B hip add       TA with hip abd iso             TA march  With heel press/elbow press    Right LE lift  5"x5  Left LE lift  1"x3         TA bridge             Diaphragmatic breathing   10x 2x10 2x10 10x  throughout tx  throughout tx throughout tx   Hip abd Iso -> TA       5"x10  5"x10 Belt  5"x10   Hip abd iso -> TA -> left heel press       5"x10   5"x10   Ther Ex             Happy Baby S   10"x1  10"x3    10"x3    Piriformis S 30"x1 ea       30"x2   [place] S         10"x5 10"x5                                                                    Ther Activity                                       Gait Training                                       Modalities

## 2020-09-03 ENCOUNTER — OFFICE VISIT (OUTPATIENT)
Dept: PHYSICAL THERAPY | Facility: CLINIC | Age: 35
End: 2020-09-03
Payer: COMMERCIAL

## 2020-09-03 DIAGNOSIS — M54.50 CHRONIC BILATERAL LOW BACK PAIN WITHOUT SCIATICA: ICD-10-CM

## 2020-09-03 DIAGNOSIS — G89.29 CHRONIC BILATERAL LOW BACK PAIN WITHOUT SCIATICA: ICD-10-CM

## 2020-09-03 DIAGNOSIS — M62.89 PELVIC FLOOR DYSFUNCTION IN FEMALE: Primary | ICD-10-CM

## 2020-09-03 PROCEDURE — 97112 NEUROMUSCULAR REEDUCATION: CPT | Performed by: PHYSICAL THERAPIST

## 2020-09-03 PROCEDURE — 97110 THERAPEUTIC EXERCISES: CPT | Performed by: PHYSICAL THERAPIST

## 2020-09-08 ENCOUNTER — OFFICE VISIT (OUTPATIENT)
Dept: PHYSICAL THERAPY | Facility: CLINIC | Age: 35
End: 2020-09-08
Payer: COMMERCIAL

## 2020-09-08 DIAGNOSIS — M54.50 CHRONIC BILATERAL LOW BACK PAIN WITHOUT SCIATICA: ICD-10-CM

## 2020-09-08 DIAGNOSIS — M62.89 PELVIC FLOOR DYSFUNCTION IN FEMALE: Primary | ICD-10-CM

## 2020-09-08 DIAGNOSIS — G89.29 CHRONIC BILATERAL LOW BACK PAIN WITHOUT SCIATICA: ICD-10-CM

## 2020-09-08 PROCEDURE — 97112 NEUROMUSCULAR REEDUCATION: CPT | Performed by: PHYSICAL THERAPIST

## 2020-09-08 PROCEDURE — 97110 THERAPEUTIC EXERCISES: CPT | Performed by: PHYSICAL THERAPIST

## 2020-09-08 PROCEDURE — 97140 MANUAL THERAPY 1/> REGIONS: CPT | Performed by: PHYSICAL THERAPIST

## 2020-09-08 NOTE — PROGRESS NOTES
Daily Note     Today's date: 2020  Patient name: Ralph Rojo  : 1985  MRN: [id number]  Referring provider: Monica Holliday DO  Dx:   Encounter Diagnosis     ICD-10-CM    1  Pelvic floor dysfunction in female  M62 89    2  Chronic bilateral low back pain without sciatica  M54 5     G89 29                   Subjective: Pt reports relief following acupuncture yesterday, but pain returned with being up during the night with her son  Objective: See treatment diary below      Assessment: Pt noted decrease in tailbone pain following TPR/TFM to hip flexor  Instructed pt in hip flexor stretch off edge of table  Pt noted some relief in hip flexor tensions following stretch  Patient would benefit from continued PT      Plan: Continue per plan of care        Precautions: PCOS  Focus on core strength, PF, and functional activities      Manuals 9/3 9/8           STM to PFM             TPR left QL/oblique             Assessment             TPR of iliopsoas  MONET  b/l MONET  b/l           Neuro Re-Ed             TA with hip add iso             TA with hip abd iso             TA march  With heel press/elbow press             TA bridge             Diaphragmatic breathing  throught tx throughout tx           Hip abd Iso -> TA Belt  5"x10 Belt  5"x10           Hip abd iso -> TA -> left heel press             Ther Ex             Happy Baby S             Piriformis S 30"x1            [place] S             Self-TPR with ball performed            EOT hip flexor  30"x3 ea                                                  Ther Activity                                       Gait Training                                       Modalities

## 2020-09-10 ENCOUNTER — OFFICE VISIT (OUTPATIENT)
Dept: PHYSICAL THERAPY | Facility: CLINIC | Age: 35
End: 2020-09-10
Payer: COMMERCIAL

## 2020-09-10 DIAGNOSIS — M54.50 CHRONIC BILATERAL LOW BACK PAIN WITHOUT SCIATICA: ICD-10-CM

## 2020-09-10 DIAGNOSIS — G89.29 CHRONIC BILATERAL LOW BACK PAIN WITHOUT SCIATICA: ICD-10-CM

## 2020-09-10 DIAGNOSIS — M62.89 PELVIC FLOOR DYSFUNCTION IN FEMALE: Primary | ICD-10-CM

## 2020-09-10 PROCEDURE — 97140 MANUAL THERAPY 1/> REGIONS: CPT | Performed by: PHYSICAL THERAPIST

## 2020-09-10 PROCEDURE — 97112 NEUROMUSCULAR REEDUCATION: CPT | Performed by: PHYSICAL THERAPIST

## 2020-09-10 NOTE — PROGRESS NOTES
Daily Note     Today's date: 9/10/2020  Patient name: Priya Dasilva  : 1985  MRN: [id number]  Referring provider: Julianna Andrews DO  Dx:   Encounter Diagnosis     ICD-10-CM    1  Pelvic floor dysfunction in female  M62 89    2  Chronic bilateral low back pain without sciatica  M54 5     G89 29                   Subjective: Patient notes reports driving 3 hours yesterday, which aggravated lower back and left glut  Pt noted some relief with using trigger point balls and stretches  Objective: See treatment diary below      Assessment: Patient reports decrease in lower back pain following TPR to hip flexor, but no change in left glut pain  Attempted TPR to left glut with no relief  Pt reported some relief in left tailbone pain following STM to left coccygeus  Patient would benefit from continued PT      Plan: Continue per plan of care        Precautions: PCOS  Focus on core strength, PF, and functional activities      Manuals 9/3 9/8 9/10          STM to PFM   [address]          TPR left QL/oblique   MONET          Assessment             TPR of iliopsoas  MONET  b/l MONET  b/l MONET  b/l          Neuro Re-Ed             TA with hip add iso             TA with hip abd iso             TA march  With heel press/elbow press             TA bridge             Diaphragmatic breathing  throught tx throughout tx throughout  tx          Hip abd Iso -> TA Belt  5"x10 Belt  5"x10 Belt  5"x10          Hip abd iso -> TA -> left heel press             Ther Ex             Happy Baby S             Piriformis S 30"x1            [place] [address]             Self-TPR with ball performed            EOT hip flexor  30"x3 ea                                                  Ther Activity                                       Gait Training                                       Modalities

## 2020-09-21 ENCOUNTER — APPOINTMENT (OUTPATIENT)
Dept: PHYSICAL THERAPY | Facility: CLINIC | Age: 35
End: 2020-09-21
Payer: COMMERCIAL

## 2020-09-24 ENCOUNTER — OFFICE VISIT (OUTPATIENT)
Dept: PHYSICAL THERAPY | Facility: CLINIC | Age: 35
End: 2020-09-24
Payer: COMMERCIAL

## 2020-09-24 DIAGNOSIS — G89.29 CHRONIC BILATERAL LOW BACK PAIN WITHOUT SCIATICA: ICD-10-CM

## 2020-09-24 DIAGNOSIS — M62.89 PELVIC FLOOR DYSFUNCTION IN FEMALE: Primary | ICD-10-CM

## 2020-09-24 DIAGNOSIS — M54.50 CHRONIC BILATERAL LOW BACK PAIN WITHOUT SCIATICA: ICD-10-CM

## 2020-09-24 PROCEDURE — 97110 THERAPEUTIC EXERCISES: CPT | Performed by: PHYSICAL THERAPIST

## 2020-09-24 PROCEDURE — 97112 NEUROMUSCULAR REEDUCATION: CPT | Performed by: PHYSICAL THERAPIST

## 2020-09-24 NOTE — PROGRESS NOTES
Daily Note     Today's date: 2020  Patient name: Mai Ross  : 1985  MRN: [id number]  Referring provider: Mic Somers DO  Dx:   Encounter Diagnosis     ICD-10-CM    1  Pelvic floor dysfunction in female  M62 89    2  Chronic bilateral low back pain without sciatica  M54 5     G89 29                   Subjective: Pt reports menstrual cycle starting on 20  Pt stopped taking muscle relaxor and hasn't noticed the difference  Pt notes relief from acupuncture for at least 2 weeks  Pt notes adding a cushion to car seat to help with tail bone pain  Objective: See treatment diary below      Assessment: Focused on self-stretching and core stabilization without flaring-up tailbone pain  Pt noted decrease in tailbone pain with EOT hip flexor stretch, but noted strain in groin  Following standing hip flexion marching, pt noted subside of groin  Pt tolerated weight shifting to glut activation with minimal increase in tailbone pain  Instructed pt to ambulate with hip in neutral (foot straight) instead of externally rotated to limit strain and stress in gluts  Pt noted core challenge with TA quadruped, with mild strain on tailbone  Provided updated HEP  Discussed decreasing treatment session to once a month to update HEP  Patient would benefit from continued PT      Plan: Continue per plan of care  Addendum 20: Patient has not returned to PT at this time  Self-discharge at this time       Precautions: PCOS  Focus on core strength, PF, and functional activities      Manuals 9/3 9/8 9/10 9/24         STM to PFM   MONET          TPR left QL/oblique   MONET          Assessment             TPR of iliopsoas  MONET  b/l MONET  b/l MONET  b/l          Neuro Re-Ed             TA PPT in standing against wall    5"x5         Weight shift    5"x10 ea         TA Quadruped    30"x3         Standing hip flexion    5x ea         Diaphragmatic breathing  throught tx throughout tx throughout  tx          Hip abd Iso -> TA Belt  5"x10 Belt  5"x10 Belt  5"x10          Hip abd iso -> TA -> left heel press             Ther Ex             Happy Baby S             Piriformis S 30"x1            [place] S             Self-TPR with ball performed            EOT hip flexor  30"x3 ea  30"x5 ea                                                Ther Activity                                       Gait Training                                       Modalities

## 2020-09-28 ENCOUNTER — TRANSCRIBE ORDERS (OUTPATIENT)
Dept: PHYSICAL THERAPY | Facility: CLINIC | Age: 35
End: 2020-09-28

## 2020-09-28 ENCOUNTER — APPOINTMENT (OUTPATIENT)
Dept: PHYSICAL THERAPY | Facility: CLINIC | Age: 35
End: 2020-09-28
Payer: COMMERCIAL

## 2020-09-28 DIAGNOSIS — M62.89 OTHER SPECIFIED DISORDERS OF MUSCLE: Primary | ICD-10-CM

## 2020-12-09 DIAGNOSIS — N91.2 AMENORRHEA: ICD-10-CM

## 2020-12-09 DIAGNOSIS — E28.2 PCOS (POLYCYSTIC OVARIAN SYNDROME): Primary | ICD-10-CM

## 2020-12-09 DIAGNOSIS — D64.9 ANEMIA, UNSPECIFIED TYPE: ICD-10-CM

## 2020-12-09 DIAGNOSIS — O99.013 ANEMIA AFFECTING PREGNANCY IN THIRD TRIMESTER: ICD-10-CM

## 2020-12-09 DIAGNOSIS — E34.9 HORMONAL DISORDER: ICD-10-CM

## 2020-12-09 DIAGNOSIS — E78.5 HYPERLIPIDEMIA, UNSPECIFIED HYPERLIPIDEMIA TYPE: ICD-10-CM

## 2021-01-03 LAB
25(OH)D3 SERPL-MCNC: 24 NG/ML (ref 30–100)
CHOLEST SERPL-MCNC: 180 MG/DL
CHOLEST/HDLC SERPL: 4 (CALC)
ESTROGEN SERPL-MCNC: 181.7 PG/ML
FSH SERPL-ACNC: 4.6 MIU/ML
HBA1C MFR BLD: 5.3 % OF TOTAL HGB
HDLC SERPL-MCNC: 45 MG/DL
LDLC SERPL CALC-MCNC: 99 MG/DL (CALC)
LH SERPL-ACNC: 8 MIU/ML
NONHDLC SERPL-MCNC: 135 MG/DL (CALC)
TESTOST SERPL-MCNC: 34 NG/DL (ref 2–45)
THYROGLOB AB SERPL-ACNC: <1 IU/ML
THYROPEROXIDASE AB SERPL-ACNC: <1 IU/ML
TRIGL SERPL-MCNC: 254 MG/DL
VIT B12 SERPL-MCNC: 545 PG/ML (ref 200–1100)

## 2021-04-02 DIAGNOSIS — Z23 ENCOUNTER FOR IMMUNIZATION: ICD-10-CM

## 2021-04-16 ENCOUNTER — IMMUNIZATIONS (OUTPATIENT)
Dept: FAMILY MEDICINE CLINIC | Facility: HOSPITAL | Age: 36
End: 2021-04-16

## 2021-04-16 DIAGNOSIS — Z23 ENCOUNTER FOR IMMUNIZATION: Primary | ICD-10-CM

## 2021-04-16 PROCEDURE — 91300 SARS-COV-2 / COVID-19 MRNA VACCINE (PFIZER-BIONTECH) 30 MCG: CPT

## 2021-04-16 PROCEDURE — 0001A SARS-COV-2 / COVID-19 MRNA VACCINE (PFIZER-BIONTECH) 30 MCG: CPT

## 2021-04-30 RX ORDER — CRANBERRY FRUIT EXTRACT 650 MG
CAPSULE ORAL
COMMUNITY

## 2021-04-30 RX ORDER — ACETYLCYSTEINE 600 MG
CAPSULE ORAL
COMMUNITY

## 2021-04-30 RX ORDER — NYSTATIN 100000 [USP'U]/G
POWDER TOPICAL
COMMUNITY
Start: 2021-04-12

## 2021-04-30 RX ORDER — CHOLECALCIFEROL (VITAMIN D3) 10(400)/ML
DROPS ORAL
COMMUNITY
Start: 2021-04-15

## 2021-05-03 ENCOUNTER — OFFICE VISIT (OUTPATIENT)
Dept: FAMILY MEDICINE CLINIC | Facility: CLINIC | Age: 36
End: 2021-05-03
Payer: COMMERCIAL

## 2021-05-03 VITALS
DIASTOLIC BLOOD PRESSURE: 76 MMHG | TEMPERATURE: 99.3 F | RESPIRATION RATE: 16 BRPM | BODY MASS INDEX: 31.04 KG/M2 | HEIGHT: 64 IN | SYSTOLIC BLOOD PRESSURE: 122 MMHG | OXYGEN SATURATION: 99 % | HEART RATE: 91 BPM | WEIGHT: 181.8 LBS

## 2021-05-03 DIAGNOSIS — N91.2 AMENORRHEA: ICD-10-CM

## 2021-05-03 DIAGNOSIS — E28.2 POLYCYSTIC OVARIAN SYNDROME: Primary | ICD-10-CM

## 2021-05-03 DIAGNOSIS — E34.9 HORMONAL DISORDER: ICD-10-CM

## 2021-05-03 DIAGNOSIS — M62.89 PELVIC FLOOR DYSFUNCTION: ICD-10-CM

## 2021-05-03 DIAGNOSIS — E55.9 VITAMIN D DEFICIENCY: ICD-10-CM

## 2021-05-03 PROBLEM — R10.2 PELVIC PAIN: Status: ACTIVE | Noted: 2021-04-26

## 2021-05-03 PROCEDURE — 99214 OFFICE O/P EST MOD 30 MIN: CPT | Performed by: FAMILY MEDICINE

## 2021-05-03 RX ORDER — MEDROXYPROGESTERONE ACETATE 10 MG/1
TABLET ORAL
COMMUNITY
Start: 2021-04-26

## 2021-05-03 RX ORDER — DULOXETIN HYDROCHLORIDE 60 MG/1
CAPSULE, DELAYED RELEASE ORAL DAILY
COMMUNITY
Start: 2021-04-26 | End: 2022-02-03 | Stop reason: SDUPTHER

## 2021-05-03 NOTE — PROGRESS NOTES
Assessment/Plan:       Diagnoses and all orders for this visit:    Polycystic ovarian syndrome  -     CBC; Future  -     Comprehensive metabolic panel; Future  -     TSH, 3rd generation; Future  -     Testosterone, free, total; Future  -     Estrogens, total; Future  -     FSH and LH; Future  -     CBC  -     Comprehensive metabolic panel  -     TSH, 3rd generation  -     Testosterone, free, total  -     Estrogens, total  -     FSH and LH    Vitamin D deficiency  -     Vitamin D 1,25 dihydroxy; Future  -     Vitamin D 1,25 dihydroxy    Hormonal disorder  -     CBC; Future  -     Comprehensive metabolic panel; Future  -     TSH, 3rd generation; Future  -     Testosterone, free, total; Future  -     Estrogens, total; Future  -     FSH and LH; Future  -     CBC  -     Comprehensive metabolic panel  -     TSH, 3rd generation  -     Testosterone, free, total  -     Estrogens, total  -     FSH and LH    Amenorrhea  -     CBC; Future  -     Comprehensive metabolic panel; Future  -     CBC  -     Comprehensive metabolic panel    Pelvic floor dysfunction  -     UA (URINE) with reflex to Scope; Future  -     UA (URINE) with reflex to Scope    Other orders  -     DULoxetine (CYMBALTA) 30 mg delayed release capsule  -     medroxyPROGESTERone (PROVERA) 10 mg tablet       28year-old chronic pelvic pain  She was started on duloxetine by her OBGYN  She is still going to therapy she still sees pelvic floor and gyn specialist   His history of PCOS  Labs ordered per her request which she will continue the duloxetine will increase to 60 if she is tolerating it well in a month   Otherwise she can follow-up in 3 months or sooner if needed      Subjective:     Chief Complaint   Patient presents with    Anxiety     Patient reports this started about 2 years ago  She's seeing a therapist for this and has recently started Cymalta   Back Pain     Patient reports back pain for about 2 years  She tried PT for a while, which helped  Stopped with COVID, and just restarted  Patient ID: Kathryn Jenkins is a 28 y o  female  Patient presents today for checkup  She was requesting blood work again  She has been seeing a new gyn who started her on duloxetine for her chronic pains   She had an ultrasound which was normal as well as a CT scan a couple months ago that was also normal gets till she is having chronic pelvic pain she is concerning she has not lost any weight either      The following portions of the patient's history were reviewed and updated as appropriate: allergies, current medications, past family history, past medical history, past social history, past surgical history and problem list     Review of Systems   Constitutional: Negative  HENT: Negative  Eyes: Negative  Respiratory: Negative  Cardiovascular: Negative  Gastrointestinal: Negative  Endocrine: Negative  Genitourinary: Negative  Musculoskeletal: Negative  Skin: Negative  Allergic/Immunologic: Negative  Neurological: Negative  Hematological: Negative  Psychiatric/Behavioral: Negative  All other systems reviewed and are negative  Objective:    Vitals:    05/03/21 1130   BP: 122/76   BP Location: Left arm   Patient Position: Sitting   Cuff Size: Standard   Pulse: 91   Resp: 16   Temp: 99 3 °F (37 4 °C)   TempSrc: Tympanic   SpO2: 99%   Weight: 82 5 kg (181 lb 12 8 oz)   Height: 5' 3 75" (1 619 m)          Physical Exam  Vitals signs and nursing note reviewed  Constitutional:       Appearance: She is well-developed  HENT:      Head: Normocephalic and atraumatic  Right Ear: External ear normal       Left Ear: External ear normal    Eyes:      Conjunctiva/sclera: Conjunctivae normal       Pupils: Pupils are equal, round, and reactive to light  Neck:      Musculoskeletal: Normal range of motion  Cardiovascular:      Rate and Rhythm: Normal rate and regular rhythm  Heart sounds: Normal heart sounds  Pulmonary:      Effort: Pulmonary effort is normal       Breath sounds: Normal breath sounds  Abdominal:      General: Bowel sounds are normal       Palpations: Abdomen is soft  Musculoskeletal: Normal range of motion  Skin:     General: Skin is warm and dry  Neurological:      Mental Status: She is alert and oriented to person, place, and time  Deep Tendon Reflexes: Reflexes are normal and symmetric  Psychiatric:         Behavior: Behavior normal          Thought Content:  Thought content normal          Judgment: Judgment normal

## 2021-05-11 ENCOUNTER — IMMUNIZATIONS (OUTPATIENT)
Dept: FAMILY MEDICINE CLINIC | Facility: HOSPITAL | Age: 36
End: 2021-05-11

## 2021-05-11 DIAGNOSIS — Z23 ENCOUNTER FOR IMMUNIZATION: Primary | ICD-10-CM

## 2021-05-11 PROCEDURE — 0002A SARS-COV-2 / COVID-19 MRNA VACCINE (PFIZER-BIONTECH) 30 MCG: CPT

## 2021-05-11 PROCEDURE — 91300 SARS-COV-2 / COVID-19 MRNA VACCINE (PFIZER-BIONTECH) 30 MCG: CPT

## 2021-07-31 LAB
1,25(OH)2D3 SERPL-MCNC: 44.3 PG/ML (ref 19.9–79.3)
ALBUMIN SERPL-MCNC: 4.6 G/DL (ref 3.8–4.8)
ALBUMIN/GLOB SERPL: 1.7 {RATIO} (ref 1.2–2.2)
ALP SERPL-CCNC: 74 IU/L (ref 48–121)
ALT SERPL-CCNC: 23 IU/L (ref 0–32)
APPEARANCE UR: CLEAR
AST SERPL-CCNC: 14 IU/L (ref 0–40)
BACTERIA URNS QL MICRO: NORMAL
BILIRUB SERPL-MCNC: 0.3 MG/DL (ref 0–1.2)
BILIRUB UR QL STRIP: NEGATIVE
BUN SERPL-MCNC: 11 MG/DL (ref 6–20)
BUN/CREAT SERPL: 12 (ref 9–23)
CALCIUM SERPL-MCNC: 9.4 MG/DL (ref 8.7–10.2)
CASTS URNS QL MICRO: NORMAL /LPF
CHLORIDE SERPL-SCNC: 100 MMOL/L (ref 96–106)
CO2 SERPL-SCNC: 23 MMOL/L (ref 20–29)
COLOR UR: YELLOW
CREAT SERPL-MCNC: 0.9 MG/DL (ref 0.57–1)
EPI CELLS #/AREA URNS HPF: NORMAL /HPF (ref 0–10)
ERYTHROCYTE [DISTWIDTH] IN BLOOD BY AUTOMATED COUNT: 12.8 % (ref 11.7–15.4)
ESTROGEN SERPL-MCNC: 182 PG/ML
FSH SERPL-ACNC: 7 MIU/ML
GLOBULIN SER-MCNC: 2.7 G/DL (ref 1.5–4.5)
GLUCOSE SERPL-MCNC: 81 MG/DL (ref 65–99)
GLUCOSE UR QL: NEGATIVE
HCT VFR BLD AUTO: 41.7 % (ref 34–46.6)
HGB BLD-MCNC: 13.6 G/DL (ref 11.1–15.9)
HGB UR QL STRIP: NEGATIVE
KETONES UR QL STRIP: NEGATIVE
LEUKOCYTE ESTERASE UR QL STRIP: ABNORMAL
LH SERPL-ACNC: 18.6 MIU/ML
MCH RBC QN AUTO: 28.2 PG (ref 26.6–33)
MCHC RBC AUTO-ENTMCNC: 32.6 G/DL (ref 31.5–35.7)
MCV RBC AUTO: 86 FL (ref 79–97)
MICRO URNS: ABNORMAL
NITRITE UR QL STRIP: NEGATIVE
PH UR STRIP: 7.5 [PH] (ref 5–7.5)
PLATELET # BLD AUTO: 279 X10E3/UL (ref 150–450)
POTASSIUM SERPL-SCNC: 4.2 MMOL/L (ref 3.5–5.2)
PROT SERPL-MCNC: 7.3 G/DL (ref 6–8.5)
PROT UR QL STRIP: NEGATIVE
RBC # BLD AUTO: 4.83 X10E6/UL (ref 3.77–5.28)
RBC #/AREA URNS HPF: NORMAL /HPF (ref 0–2)
SL AMB EGFR AFRICAN AMERICAN: 96 ML/MIN/1.73
SL AMB EGFR NON AFRICAN AMERICAN: 83 ML/MIN/1.73
SODIUM SERPL-SCNC: 136 MMOL/L (ref 134–144)
SP GR UR: 1.01 (ref 1–1.03)
TESTOST FREE SERPL-MCNC: 4.2 PG/ML (ref 0–4.2)
TESTOST SERPL-MCNC: 47 NG/DL (ref 8–60)
TSH SERPL DL<=0.005 MIU/L-ACNC: 1.52 UIU/ML (ref 0.45–4.5)
UROBILINOGEN UR STRIP-ACNC: 0.2 MG/DL (ref 0.2–1)
WBC # BLD AUTO: 9 X10E3/UL (ref 3.4–10.8)
WBC #/AREA URNS HPF: NORMAL /HPF (ref 0–5)

## 2021-08-03 ENCOUNTER — OFFICE VISIT (OUTPATIENT)
Dept: FAMILY MEDICINE CLINIC | Facility: CLINIC | Age: 36
End: 2021-08-03
Payer: COMMERCIAL

## 2021-08-03 VITALS
HEIGHT: 64 IN | BODY MASS INDEX: 29.98 KG/M2 | HEART RATE: 118 BPM | WEIGHT: 175.6 LBS | DIASTOLIC BLOOD PRESSURE: 94 MMHG | SYSTOLIC BLOOD PRESSURE: 136 MMHG | TEMPERATURE: 98.5 F | RESPIRATION RATE: 18 BRPM | OXYGEN SATURATION: 98 %

## 2021-08-03 DIAGNOSIS — Z12.31 ENCOUNTER FOR SCREENING MAMMOGRAM FOR MALIGNANT NEOPLASM OF BREAST: ICD-10-CM

## 2021-08-03 DIAGNOSIS — R35.0 URINARY FREQUENCY: Primary | ICD-10-CM

## 2021-08-03 DIAGNOSIS — F51.01 PRIMARY INSOMNIA: ICD-10-CM

## 2021-08-03 DIAGNOSIS — E28.2 POLYCYSTIC OVARIAN SYNDROME: ICD-10-CM

## 2021-08-03 LAB
SL AMB  POCT GLUCOSE, UA: ABNORMAL
SL AMB LEUKOCYTE ESTERASE,UA: ABNORMAL
SL AMB POCT BILIRUBIN,UA: ABNORMAL
SL AMB POCT BLOOD,UA: ABNORMAL
SL AMB POCT CLARITY,UA: ABNORMAL
SL AMB POCT COLOR,UA: ABNORMAL
SL AMB POCT KETONES,UA: ABNORMAL
SL AMB POCT NITRITE,UA: ABNORMAL
SL AMB POCT PH,UA: 5
SL AMB POCT SPECIFIC GRAVITY,UA: 1.01
SL AMB POCT URINE PROTEIN: ABNORMAL
SL AMB POCT UROBILINOGEN: NORMAL

## 2021-08-03 PROCEDURE — 3008F BODY MASS INDEX DOCD: CPT | Performed by: FAMILY MEDICINE

## 2021-08-03 PROCEDURE — 3725F SCREEN DEPRESSION PERFORMED: CPT | Performed by: FAMILY MEDICINE

## 2021-08-03 PROCEDURE — 99214 OFFICE O/P EST MOD 30 MIN: CPT | Performed by: FAMILY MEDICINE

## 2021-08-03 PROCEDURE — 1036F TOBACCO NON-USER: CPT | Performed by: FAMILY MEDICINE

## 2021-08-03 PROCEDURE — 81002 URINALYSIS NONAUTO W/O SCOPE: CPT | Performed by: FAMILY MEDICINE

## 2021-08-03 RX ORDER — SULFAMETHOXAZOLE AND TRIMETHOPRIM 800; 160 MG/1; MG/1
1 TABLET ORAL EVERY 12 HOURS SCHEDULED
Qty: 14 TABLET | Refills: 0 | Status: SHIPPED | OUTPATIENT
Start: 2021-08-03 | End: 2021-08-10

## 2021-08-03 NOTE — PROGRESS NOTES
Assessment/Plan:         Diagnoses and all orders for this visit:    Urinary frequency  -     POCT urine dip  -     sulfamethoxazole-trimethoprim (BACTRIM DS) 800-160 mg per tablet; Take 1 tablet by mouth every 12 (twelve) hours for 7 days    Polycystic ovarian syndrome  -     Ambulatory referral to Obstetrics / Gynecology; Future    Primary insomnia    Encounter for screening mammogram for malignant neoplasm of breast  -     Mammo screening bilateral w 3d & cad; Future       Bactrim ordered for her dysuria   Patient is requesting a new OBGYN refer to [address]    Discussed her sleep issues  Likely due to excessive stress from AL child   Mammogram ordered   She will follow-up with her Obgyn   her labs are reviewed and are normal   She can follow up with me in 6 months or sooner if neededBMI Counseling: Body mass index is 30 38 kg/m²  The BMI is above normal  Nutrition recommendations include decreasing portion sizes, encouraging healthy choices of fruits and vegetables, decreasing fast food intake, consuming healthier snacks, limiting drinks that contain sugar, moderation in carbohydrate intake, increasing intake of lean protein, reducing intake of saturated and trans fat and reducing intake of cholesterol  Exercise recommendations include exercising 3-5 times per week  Subjective:     Chief Complaint   Patient presents with    Follow-up     3 month chk     Urinary Frequency     for 1 day         Patient ID: Maria E Parent is a 28 y o  female       Patient is here for six-month check   Reviewed recent lab work is all normal   Patient is complaining of dysuria today for the past few days  She is also complaining lot of insomnia from increased stress after her child was injured      The following portions of the patient's history were reviewed and updated as appropriate: allergies, current medications, past family history, past medical history, past social history, past surgical history and problem list     Review of Systems   Constitutional: Negative  HENT: Negative  Eyes: Negative  Respiratory: Negative  Cardiovascular: Negative  Gastrointestinal: Negative  Endocrine: Negative  Genitourinary: Positive for dysuria  Musculoskeletal: Negative  Skin: Negative  Allergic/Immunologic: Negative  Neurological: Negative  Hematological: Negative  Psychiatric/Behavioral: Positive for sleep disturbance  All other systems reviewed and are negative  Objective:    Vitals:    08/03/21 0934   BP: 136/94   BP Location: Left arm   Patient Position: Sitting   Cuff Size: Standard   Pulse: (!) 118   Resp: 18   Temp: 98 5 °F (36 9 °C)   TempSrc: Tympanic   SpO2: 98%   Weight: 79 7 kg (175 lb 9 6 oz)   Height: 5' 3 75" (1 619 m)          Physical Exam  Vitals and nursing note reviewed  Constitutional:       Appearance: She is well-developed  HENT:      Head: Normocephalic and atraumatic  Right Ear: External ear normal       Left Ear: External ear normal    Eyes:      Conjunctiva/sclera: Conjunctivae normal       Pupils: Pupils are equal, round, and reactive to light  Cardiovascular:      Rate and Rhythm: Normal rate and regular rhythm  Heart sounds: Normal heart sounds  Pulmonary:      Effort: Pulmonary effort is normal       Breath sounds: Normal breath sounds  Abdominal:      General: Bowel sounds are normal       Palpations: Abdomen is soft  Musculoskeletal:         General: Normal range of motion  Cervical back: Normal range of motion  Skin:     General: Skin is warm and dry  Neurological:      Mental Status: She is alert and oriented to person, place, and time  Deep Tendon Reflexes: Reflexes are normal and symmetric  Psychiatric:         Behavior: Behavior normal          Thought Content:  Thought content normal          Judgment: Judgment normal

## 2021-10-05 ENCOUNTER — TELEPHONE (OUTPATIENT)
Dept: FAMILY MEDICINE CLINIC | Facility: CLINIC | Age: 36
End: 2021-10-05

## 2021-12-03 ENCOUNTER — TELEPHONE (OUTPATIENT)
Dept: FAMILY MEDICINE CLINIC | Facility: CLINIC | Age: 36
End: 2021-12-03

## 2021-12-03 ENCOUNTER — HOSPITAL ENCOUNTER (OUTPATIENT)
Dept: ULTRASOUND IMAGING | Facility: HOSPITAL | Age: 36
Discharge: HOME | End: 2021-12-03
Payer: COMMERCIAL

## 2021-12-03 ENCOUNTER — TELEPHONE (OUTPATIENT)
Dept: OBGYN CLINIC | Facility: CLINIC | Age: 36
End: 2021-12-03

## 2021-12-03 DIAGNOSIS — R10.2 PELVIC PAIN: Primary | ICD-10-CM

## 2021-12-03 DIAGNOSIS — R10.2 PELVIC PAIN: ICD-10-CM

## 2021-12-03 PROCEDURE — 76830 TRANSVAGINAL US NON-OB: CPT

## 2021-12-03 PROCEDURE — 76856 US EXAM PELVIC COMPLETE: CPT

## 2021-12-08 ENCOUNTER — HOSPITAL ENCOUNTER (OUTPATIENT)
Dept: MAMMOGRAPHY | Facility: CLINIC | Age: 36
Discharge: HOME | End: 2021-12-08
Payer: COMMERCIAL

## 2021-12-08 VITALS — HEIGHT: 64 IN | WEIGHT: 173 LBS | BODY MASS INDEX: 29.53 KG/M2

## 2021-12-08 DIAGNOSIS — Z12.31 ENCOUNTER FOR SCREENING MAMMOGRAM FOR MALIGNANT NEOPLASM OF BREAST: ICD-10-CM

## 2021-12-08 PROCEDURE — 77067 SCR MAMMO BI INCL CAD: CPT

## 2021-12-08 PROCEDURE — 77063 BREAST TOMOSYNTHESIS BI: CPT

## 2022-02-03 ENCOUNTER — TELEMEDICINE (OUTPATIENT)
Dept: FAMILY MEDICINE CLINIC | Facility: CLINIC | Age: 37
End: 2022-02-03
Payer: COMMERCIAL

## 2022-02-03 VITALS — BODY MASS INDEX: 29.7 KG/M2 | WEIGHT: 173 LBS | TEMPERATURE: 98.1 F

## 2022-02-03 DIAGNOSIS — R10.2 CHRONIC PELVIC PAIN IN FEMALE: ICD-10-CM

## 2022-02-03 DIAGNOSIS — B02.9 HERPES ZOSTER WITHOUT COMPLICATION: Primary | ICD-10-CM

## 2022-02-03 DIAGNOSIS — G89.29 CHRONIC PELVIC PAIN IN FEMALE: ICD-10-CM

## 2022-02-03 DIAGNOSIS — F33.0 MILD EPISODE OF RECURRENT MAJOR DEPRESSIVE DISORDER (HCC): ICD-10-CM

## 2022-02-03 PROCEDURE — 99214 OFFICE O/P EST MOD 30 MIN: CPT | Performed by: FAMILY MEDICINE

## 2022-02-03 PROCEDURE — 1036F TOBACCO NON-USER: CPT | Performed by: FAMILY MEDICINE

## 2022-02-03 RX ORDER — VALACYCLOVIR HYDROCHLORIDE 1 G/1
1000 TABLET, FILM COATED ORAL 3 TIMES DAILY
Qty: 21 TABLET | Refills: 0 | Status: SHIPPED | OUTPATIENT
Start: 2022-02-03 | End: 2022-02-10

## 2022-02-03 RX ORDER — DULOXETIN HYDROCHLORIDE 60 MG/1
60 CAPSULE, DELAYED RELEASE ORAL DAILY
Qty: 90 CAPSULE | Refills: 0 | Status: SHIPPED | OUTPATIENT
Start: 2022-02-03

## 2022-02-03 RX ORDER — FLUTICASONE PROPIONATE 50 MCG
2 SPRAY, SUSPENSION (ML) NASAL DAILY
COMMUNITY

## 2022-02-03 NOTE — PROGRESS NOTES
Virtual Regular Visit    Verification of patient location:    Patient is located in the following state in which I hold an active license [place]      Assessment/Plan:    Problem List Items Addressed This Visit     None      Visit Diagnoses     Herpes zoster without complication    -  Primary    Relevant Medications    valACYclovir (VALTREX) 1,000 mg tablet    Mild episode of recurrent major depressive disorder (HCC)        Relevant Medications    DULoxetine (CYMBALTA) 60 mg delayed release capsule         patient needs follow-up with her gyn  Will hold off on any changes to her duloxetine at this time in the script was refilled  Valacyclovir ordered for her shingles infection   Discussed her COVID infection symptomatic care at this time   She will need to follow-up in a month      BMI Counseling: Body mass index is 29 7 kg/m²  The BMI is above normal  Nutrition recommendations include decreasing portion sizes, encouraging healthy choices of fruits and vegetables, decreasing fast food intake, consuming healthier snacks, limiting drinks that contain sugar, moderation in carbohydrate intake, increasing intake of lean protein, reducing intake of saturated and trans fat and reducing intake of cholesterol  Exercise recommendations include exercising 3-5 times per week  Rationale for BMI follow-up plan is due to patient being overweight or obese  Depression Screening and Follow-up Plan: Patient was screened for depression during today's encounter  They screened negative with a PHQ-2 score of 2          Reason for visit is   Chief Complaint   Patient presents with    Rash     painful rash on back and starting to get it on abdoman--Positive home test COVID     Virtual Regular Visit        Encounter provider Cecil Mane DO    Provider located at [address] [address] [phone]      Recent Visits  No visits were found meeting these conditions  Showing recent visits within past 7 days and meeting all other requirements  Today's Visits  Date Type Provider Dept   02/03/22 Telemedicine DO Brayan Stark   Showing today's visits and meeting all other requirements  Future Appointments  No visits were found meeting these conditions  Showing future appointments within next 150 days and meeting all other requirements       The patient was identified by name and date of birth  Milvia Tong was informed that this is a telemedicine visit and that the visit is being conducted through [address] [facility] and patient was informed that this is a secure, HIPAA-compliant platform  She agrees to proceed     My office door was closed  No one else was in the room  She acknowledged consent and understanding of privacy and security of the video platform  The patient has agreed to participate and understands they can discontinue the visit at any time  Patient is aware this is a billable service  Subjective  Milvia Tong is a 39 y o  female  Presents today for multiple questions          Patient presents today for multiple questions   She recently made appointment to talk about her pelvic pain as well as decreasing her duloxetine  But she tested positive for COVID at home test yesterday she feels fatigued and has some mild upper respiratory symptoms   She also broke out with a rash on her back that is radiating around to her front on her right side       Past Medical History:   Diagnosis Date    History of blood clots     History of PCOS        Past Surgical History:   Procedure Laterality Date    ARTHROSCOPIC REPAIR ACL         Current Outpatient Medications   Medication Sig Dispense Refill    Acetylcysteine (N-Acetyl-L-Cysteine) 600 MG CAPS       cholecalciferol (VITAMIN D) 400 units/1 mL       cyanocobalamin (VITAMIN B-12) 100 mcg tablet Take by mouth daily      DULoxetine (CYMBALTA) 60 mg delayed release capsule Take 1 capsule (60 mg total) by mouth daily 90 capsule 0    Inositol-D Chiro-Inositol (Ovasitol) 2000-50 MG PACK       Multiple Vitamin (MULTIVITAMIN ADULT PO) Take 1 tablet by mouth      NAPHAZOLINE-GLYCERIN-ZINC SULF OP       fluticasone (FLONASE) 50 mcg/act nasal spray 2 sprays into each nostril daily      medroxyPROGESTERone (PROVERA) 10 mg tablet  (Patient not taking: Reported on 8/3/2021)      Multiple Vitamin (MULTIVITAMIN) tablet Take 1 tablet by mouth daily      nystatin (MYCOSTATIN) powder  (Patient not taking: Reported on 8/3/2021)      valACYclovir (VALTREX) 1,000 mg tablet Take 1 tablet (1,000 mg total) by mouth 3 (three) times a day for 7 days 21 tablet 0     No current facility-administered medications for this visit  No Known Allergies    Review of Systems   Constitutional: Negative  HENT: Negative  Eyes: Negative  Respiratory: Negative  Cardiovascular: Negative  Gastrointestinal: Negative  Endocrine: Negative  Genitourinary: Negative  Musculoskeletal: Negative  Skin: Positive for rash  Allergic/Immunologic: Negative  Neurological: Negative  Hematological: Negative  Psychiatric/Behavioral: Negative  All other systems reviewed and are negative  Video Exam    Vitals:    02/03/22 0832   Temp: 98 1 °F (36 7 °C)   TempSrc: Oral   Weight: 78 5 kg (173 lb)       Physical Exam  Vitals and nursing note reviewed  Constitutional:       Appearance: She is well-developed  HENT:      Head: Normocephalic and atraumatic  Right Ear: External ear normal       Left Ear: External ear normal    Eyes:      Conjunctiva/sclera: Conjunctivae normal    Pulmonary:      Effort: Pulmonary effort is normal    Musculoskeletal:         General: Normal range of motion  Cervical back: Normal range of motion  Skin:     General: Skin is warm and dry        Comments:  Rash on back difficult to ascertain through video technology however appears to be shingles and patient's history confirms this   Neurological:      Mental Status: She is alert and oriented to person, place, and time  Deep Tendon Reflexes: Reflexes are normal and symmetric  Psychiatric:         Behavior: Behavior normal          Thought Content: Thought content normal          Judgment: Judgment normal           I spent  15 minutes directly with the patient during this visit    VIRTUAL VISIT DISCLAIMER      Arnold Cabello verbally agrees to participate in [employer]  Pt is aware that [employer] could be limited without vital signs or the ability to perform a full hands-on physical Suzzane Shearing understands she or the provider may request at any time to terminate the video visit and request the patient to seek care or treatment in person

## 2022-03-30 ENCOUNTER — OFFICE VISIT (OUTPATIENT)
Dept: FAMILY MEDICINE CLINIC | Facility: CLINIC | Age: 37
End: 2022-03-30
Payer: COMMERCIAL

## 2022-03-30 VITALS
RESPIRATION RATE: 17 BRPM | TEMPERATURE: 98.1 F | HEART RATE: 115 BPM | WEIGHT: 174.2 LBS | HEIGHT: 64 IN | SYSTOLIC BLOOD PRESSURE: 124 MMHG | OXYGEN SATURATION: 99 % | BODY MASS INDEX: 29.74 KG/M2 | DIASTOLIC BLOOD PRESSURE: 82 MMHG

## 2022-03-30 DIAGNOSIS — F51.01 PRIMARY INSOMNIA: Primary | ICD-10-CM

## 2022-03-30 DIAGNOSIS — F34.1 DYSTHYMIA: ICD-10-CM

## 2022-03-30 DIAGNOSIS — Z13.31 POSITIVE DEPRESSION SCREENING: ICD-10-CM

## 2022-03-30 PROCEDURE — 1036F TOBACCO NON-USER: CPT | Performed by: FAMILY MEDICINE

## 2022-03-30 PROCEDURE — 3725F SCREEN DEPRESSION PERFORMED: CPT | Performed by: FAMILY MEDICINE

## 2022-03-30 PROCEDURE — 99214 OFFICE O/P EST MOD 30 MIN: CPT | Performed by: FAMILY MEDICINE

## 2022-03-30 PROCEDURE — 3008F BODY MASS INDEX DOCD: CPT | Performed by: FAMILY MEDICINE

## 2022-03-30 RX ORDER — TRAZODONE HYDROCHLORIDE 50 MG/1
50 TABLET ORAL
Qty: 30 TABLET | Refills: 3 | Status: SHIPPED | OUTPATIENT
Start: 2022-03-30

## 2022-03-30 NOTE — PROGRESS NOTES
Assessment/Plan:     Diagnoses and all orders for this visit:    Primary insomnia  -     traZODone (DESYREL) 50 mg tablet; Take 1 tablet (50 mg total) by mouth daily at bedtime    Positive depression screening    Dysthymia      I do believe patient's symptoms are related to her suddenly stopping her Cymbalta  Will address the sleeping issue today with starting trazodone 50 mg at bedtime  Her labs were done within the past year all completely normal   Can follow-up as scheduled      Subjective:     Chief Complaint   Patient presents with    Headache     "Feels wiped out"        Patient ID: Jin Parish is a 39 y o  female  Patient presents today for multiple complaints   She recently stopped her Cymbalta on her own   She is tired she is not sleeping well she is having headaches and she feels and brain zaps"      The following portions of the patient's history were reviewed and updated as appropriate: allergies, current medications, past family history, past medical history, past social history, past surgical history and problem list     Review of Systems   Constitutional: Positive for fatigue  HENT: Negative  Eyes: Negative  Respiratory: Negative  Cardiovascular: Negative  Gastrointestinal: Negative  Endocrine: Negative  Genitourinary: Negative  Musculoskeletal: Negative  Skin: Negative  Allergic/Immunologic: Negative  Neurological: Positive for headaches  Hematological: Negative  Psychiatric/Behavioral: Positive for dysphoric mood and sleep disturbance  All other systems reviewed and are negative  Objective:    Vitals:    03/30/22 0811   BP: 124/82   BP Location: Left arm   Patient Position: Sitting   Cuff Size: Large   Pulse: (!) 115   Resp: 17   Temp: 98 1 °F (36 7 °C)   TempSrc: Tympanic   SpO2: 99%   Weight: 79 kg (174 lb 3 2 oz)   Height: 5' 4" (1 626 m)          Physical Exam  Vitals and nursing note reviewed     Constitutional:       Appearance: She is well-developed  HENT:      Head: Normocephalic and atraumatic  Right Ear: External ear normal       Left Ear: External ear normal    Eyes:      Conjunctiva/sclera: Conjunctivae normal       Pupils: Pupils are equal, round, and reactive to light  Cardiovascular:      Rate and Rhythm: Normal rate and regular rhythm  Heart sounds: Normal heart sounds  Pulmonary:      Effort: Pulmonary effort is normal       Breath sounds: Normal breath sounds  Abdominal:      General: Bowel sounds are normal       Palpations: Abdomen is soft  Musculoskeletal:         General: Normal range of motion  Cervical back: Normal range of motion  Skin:     General: Skin is warm and dry  Neurological:      Mental Status: She is alert and oriented to person, place, and time  Deep Tendon Reflexes: Reflexes are normal and symmetric  Psychiatric:         Behavior: Behavior normal          Thought Content: Thought content normal          Judgment: Judgment normal          Depression Screening Follow-up Plan: Patient's depression screening was positive with a PHQ-2 score of 6  Their PHQ-9 score was 18  Patient assessed for underlying major depression  They have no active suicidal ideations  Brief counseling provided and recommend additional follow-up/re-evaluation next office visit

## 2022-10-04 ENCOUNTER — EVALUATION (OUTPATIENT)
Dept: PHYSICAL THERAPY | Facility: CLINIC | Age: 37
End: 2022-10-04
Payer: COMMERCIAL

## 2022-10-04 DIAGNOSIS — M62.89 PELVIC FLOOR DYSFUNCTION IN FEMALE: Primary | ICD-10-CM

## 2022-10-04 DIAGNOSIS — G58.8 NEURALGIA OF LEFT PUDENDAL NERVE: ICD-10-CM

## 2022-10-04 PROCEDURE — 97112 NEUROMUSCULAR REEDUCATION: CPT | Performed by: PHYSICAL THERAPIST

## 2022-10-04 PROCEDURE — 97161 PT EVAL LOW COMPLEX 20 MIN: CPT | Performed by: PHYSICAL THERAPIST

## 2022-10-04 NOTE — PROGRESS NOTES
PT Evaluation     Today's date: 10/4/2022  Patient name: Eric Walker  : 1985  MRN: [id number]  Referring provider: Tino Garces DO  Dx:   Encounter Diagnosis     ICD-10-CM    1  Pelvic floor dysfunction in female  M62 89    2  Neuralgia of left pudendal nerve  G58 8                   Assessment  Assessment details: Eric Walker is a 40 y o  female who presents with concerns of pelvic floor dysfunction causing urinary stress incontinence and flare-up of pudendal nerve pain during pregnancy  Pelvic floor anatomy was explained to patient utilizing a pelvic model and examination technique was discussed, including all risks and precautions  Patient provided verbal and written consent for internal pelvic floor muscle assessment prior to examination  Demonstrates decrease in pelvic floor muscle (PFM) strength/endurance and decrease PFM relaxation  Therapeutic activities performed upon examination included education pelvic floor anatomy  Neuromuscular re-education exercises include instruction and cues on appropriate contraction pelvic floor muscles (PFM) without accessory muscles and relaxation of PFM through diaphragm breathing  Patient presents with the below outlined deficits and is appropriate for skilled physical therapy in order to address deficits and ultimately meet goal of independent self management of condition  Thank you for this referral!  Impairments: abnormal muscle tone, abnormal or restricted ROM, impaired physical strength, lacks appropriate home exercise program, pain with function and poor posture     Goals  Impairment Goals upon discharge  1  Improve MMT for pelvic floor muscle (PFM) to greater than or equal to 4/5 in order to improve (lumbopelvic stability, bladder control, bowel control, pelvic organ prolapse)  2  Increase PFM endurance to 5 second hold for 10 reps  3  Improve relaxation of PFM to 100% in 2 seconds  4   Demonstrates appropriate breathing mechanics with pelvic floor relaxation and contraction for improved muscle coordination in 8 weeks    Functional Goals Upon Discharge  1  Pt will be independent with home exercise program, with proper demonstration, rationale and understanding in order to improve functional abilities and quality of life  2  Patient is knowledgeable of postural and pelvic floor relaxation strategies to optimize toileting techniques  3  Decrease nocturia to 1 voids/night for more thorough sleep  4  Patient reports able to cough/laugh/sneeze with 50% less bladder leakage in order to improve quality of life  5  Patient is able to 1 mile without increase in pudendal nerve pain    Plan  Patient would benefit from: women's health eval and skilled physical therapy  Planned modality interventions: biofeedback  Planned therapy interventions: manual therapy, neuromuscular re-education, patient education, self care, strengthening, stretching, home exercise program and behavior modification  Frequency: 1x week  Duration in weeks: 8  Treatment plan discussed with: patient        PT Pelvic Floor Subjective:   History of Present Illness:   39 yo female presents with concerns of urinary stress incontinence and chronic left pudendal nerve pain during her pregnancy  Pt is currently  24 weeks pregnant,   Estimated due date 22  Pt has history of pelvic pain following birth of her first child 4 years ago  Pt c/o urinary incontinence with coughing and sneezing and flare-up of left pudendal nerve pain with walking or ADLs     Diet and Exercise:      Exercise type: walking and yoga    Exercise frequency: 3-4 times per week    Walking 1 mile around the block most days during nice weather  OB/ gyn History    Gestational History:     Prior Pregnancy: Yes      Number of prior pregnancies: 2    Number of term pregnancies: 1    Delivery Type: vaginal delivery      Number of vaginal deliveries: 1  Bladder Function:     Voiding Difficulties comments:     Voiding frequency: every 1-2 hours and every 3-4 hours    Urinary leakage: urine leakage    Urinary leakage aggravated by: coughing and sneezing    Urinary leakage not aggravated by: bending, exercise, standing up and walking to the bathroom    Nocturia (episodes per night): 2    Painful urination: No      Intake (ounces): Water: 96, Coffee: 8,   Incontinence Management:     Pads/Diaper Use:  Day (when out of the house)  Bowel Function:     Bowel frequency: daily    Stool softener use: no stool softeners  Sexual Function:     Sexually Active:  Sexually active    Pain during intercourse: No (except when hitting the nerve)    Pain:     At worst pain rating:  3 (walking)    Location:  Pelvic (Pudental Nerve)    Quality:  Sharp    Progression:  No change  Patient Goals:     Patient goals for therapy:  Improved quality of life, fully empty bladder or bowels, decreased pain, improved bladder or bowel function and relaxation      Objective   Pelvic Floor Exam   Position: supine exam    Pelvic Floor Muscle Exam     Palpation   Minimal increased muscle tension in the coccygeus  Moderate increased muscle tension in the puborectalis, pubococcygeus, iliococcygeus and obturator internus  No tenderness on right in the bulbospongiosus, ischiocavernosus, super transverse perineal, puborectalis, pubococcygeus, coccygeus and obturator internus  Minimal tenderness on right in the iliococcygeus  Minimal tenderness on left in the ischiocavernosus, super transverse perineal, puborectalis, pubococcygeus, iliococcygeus and coccygeus  Moderate tenderness on left in the bulbospongiosus and obturator internus    Breathing pattern with contraction: apical  Pelvic floor muscle relaxation is delayed and incomplete  75% pelvic floor relaxation  3 seconds required for complete relaxation  PERFECT Score   Power right: 3/5  Power left: 3/5  Endurance (seconds to max):  2  Repetitions (before fatigue): 5        Flowsheet Rows    Flowsheet Row Most Recent Value PT/OT G-Codes    Current Score 21   Projected Score 0             Precautions: history of pelvic pain  Focus: pudendal nerve mobs, PFM strengthening/relaxation, prepare for labor    [facility] (print and volodymyr)  Manuals 10/4            STM PFM nv as max                                                   Neuro Re-Ed             Seated TA 1"x5            Seated kegel nv                                                                             Ther Ex             Seated happy baby stretch 30"x3            Supine Happy baby stretch nv            Quadruped belly hang nv            OI stretch             Piriformis stretch nv                                                   Ther Activity                                       Gait Training                                       Modalities

## 2022-10-04 NOTE — LETTER
2022    [facility]  [address]    Patient: Eric Walker   YOB: 1985   Date of Visit: 10/4/2022     Encounter Diagnosis     ICD-10-CM    1  Pelvic floor dysfunction in female  M62 89    2  Neuralgia of left pudendal nerve  G58 8        Dear Dr Olamide Moreira:    Thank you for your recent referral of Eric Walker  Please review the attached evaluation summary from Laurren's recent visit  Please verify that you agree with the plan of care by signing the attached order  If you have any questions or concerns, please do not hesitate to call  I sincerely appreciate the opportunity to share in the care of one of your patients and hope to have another opportunity to work with you in the near future  Sincerely,    Tonye Spurling, PT      Referring Provider:      I certify that I have read the below Plan of Care and certify the need for these services furnished under this plan of treatment while under my care  Dennise Rizvi DO  [address]  Via Fax: [phone]          PT Evaluation     Today's date: 10/4/2022  Patient name: Eric Walker  : 1985  MRN: [id number]  Referring provider: Tino Garces DO  Dx:   Encounter Diagnosis     ICD-10-CM    1  Pelvic floor dysfunction in female  M62 89    2  Neuralgia of left pudendal nerve  G58 8                   Assessment  Assessment details: Eric Walker is a 40 y o  female who presents with concerns of pelvic floor dysfunction causing urinary stress incontinence and flare-up of pudendal nerve pain during pregnancy  Pelvic floor anatomy was explained to patient utilizing a pelvic model and examination technique was discussed, including all risks and precautions  Patient provided verbal and written consent for internal pelvic floor muscle assessment prior to examination    Demonstrates decrease in pelvic floor muscle (PFM) strength/endurance and decrease PFM relaxation  Therapeutic activities performed upon examination included education pelvic floor anatomy  Neuromuscular re-education exercises include instruction and cues on appropriate contraction pelvic floor muscles (PFM) without accessory muscles and relaxation of PFM through diaphragm breathing  Patient presents with the below outlined deficits and is appropriate for skilled physical therapy in order to address deficits and ultimately meet goal of independent self management of condition  Thank you for this referral!  Impairments: abnormal muscle tone, abnormal or restricted ROM, impaired physical strength, lacks appropriate home exercise program, pain with function and poor posture     Goals  Impairment Goals upon discharge  1  Improve MMT for pelvic floor muscle (PFM) to greater than or equal to 4/5 in order to improve (lumbopelvic stability, bladder control, bowel control, pelvic organ prolapse)  2  Increase PFM endurance to 5 second hold for 10 reps  3  Improve relaxation of PFM to 100% in 2 seconds  4  Demonstrates appropriate breathing mechanics with pelvic floor relaxation and contraction for improved muscle coordination in 8 weeks    Functional Goals Upon Discharge  1  Pt will be independent with home exercise program, with proper demonstration, rationale and understanding in order to improve functional abilities and quality of life  2  Patient is knowledgeable of postural and pelvic floor relaxation strategies to optimize toileting techniques  3  Decrease nocturia to 1 voids/night for more thorough sleep  4  Patient reports able to cough/laugh/sneeze with 50% less bladder leakage in order to improve quality of life  5   Patient is able to 1 mile without increase in pudendal nerve pain    Plan  Patient would benefit from: women's health eval and skilled physical therapy  Planned modality interventions: biofeedback  Planned therapy interventions: manual therapy, neuromuscular re-education, patient education, self care, strengthening, stretching, home exercise program and behavior modification  Frequency: 1x week  Duration in weeks: 8  Treatment plan discussed with: patient        PT Pelvic Floor Subjective:   History of Present Illness:   41 yo female presents with concerns of urinary stress incontinence and chronic left pudendal nerve pain during her pregnancy  Pt is currently  24 weeks pregnant,   Estimated due date 22  Pt has history of pelvic pain following birth of her first child 4 years ago  Pt c/o urinary incontinence with coughing and sneezing and flare-up of left pudendal nerve pain with walking or ADLs  Diet and Exercise:      Exercise type: walking and yoga    Exercise frequency: 3-4 times per week    Walking 1 mile around the block most days during nice weather  OB/ gyn History    Gestational History:     Prior Pregnancy: Yes      Number of prior pregnancies: 2    Number of term pregnancies: 1    Delivery Type: vaginal delivery      Number of vaginal deliveries: 1  Bladder Function:     Voiding Difficulties comments:     Voiding frequency: every 1-2 hours and every 3-4 hours    Urinary leakage: urine leakage    Urinary leakage aggravated by: coughing and sneezing    Urinary leakage not aggravated by: bending, exercise, standing up and walking to the bathroom    Nocturia (episodes per night): 2    Painful urination: No      Intake (ounces):  Water: 96, Coffee: 8,   Incontinence Management:     Pads/Diaper Use:  Day (when out of the house)  Bowel Function:     Bowel frequency: daily    Stool softener use: no stool softeners  Sexual Function:     Sexually Active:  Sexually active    Pain during intercourse: No (except when hitting the nerve)    Pain:     At worst pain rating:  3 (walking)    Location:  Pelvic (Pudental Nerve)    Quality:  Sharp    Progression:  No change  Patient Goals:     Patient goals for therapy:  Improved quality of life, fully empty bladder or bowels, decreased pain, improved bladder or bowel function and relaxation      Objective   Pelvic Floor Exam   Position: supine exam    Pelvic Floor Muscle Exam     Palpation   Minimal increased muscle tension in the coccygeus  Moderate increased muscle tension in the puborectalis, pubococcygeus, iliococcygeus and obturator internus  No tenderness on right in the bulbospongiosus, ischiocavernosus, super transverse perineal, puborectalis, pubococcygeus, coccygeus and obturator internus  Minimal tenderness on right in the iliococcygeus  Minimal tenderness on left in the ischiocavernosus, super transverse perineal, puborectalis, pubococcygeus, iliococcygeus and coccygeus  Moderate tenderness on left in the bulbospongiosus and obturator internus    Breathing pattern with contraction: apical  Pelvic floor muscle relaxation is delayed and incomplete  75% pelvic floor relaxation  3 seconds required for complete relaxation  PERFECT Score   Power right: 3/5  Power left: 3/5  Endurance (seconds to max):  2  Repetitions (before fatigue): 5        Flowsheet Rows    Flowsheet Row Most Recent Value   PT/OT G-Codes    Current Score 21   Projected Score 0             Precautions: history of pelvic pain  Focus: pudendal nerve mobs, PFM strengthening/relaxation, prepare for labor    [facility] (print and volodymyr)  Manuals 10/4            STM PFM nv as max                                                   Neuro Re-Ed             Seated TA 1"x5            Seated kegel nv                                                                             Ther Ex             Seated happy baby stretch 30"x3            Supine Happy baby stretch nv            Quadruped belly hang nv            OI stretch             Piriformis stretch nv                                                   Ther Activity                                       Gait Training                                       Modalities

## 2022-10-18 ENCOUNTER — OFFICE VISIT (OUTPATIENT)
Dept: PHYSICAL THERAPY | Facility: CLINIC | Age: 37
End: 2022-10-18
Payer: COMMERCIAL

## 2022-10-18 DIAGNOSIS — G58.8 NEURALGIA OF LEFT PUDENDAL NERVE: ICD-10-CM

## 2022-10-18 DIAGNOSIS — M62.89 PELVIC FLOOR DYSFUNCTION IN FEMALE: Primary | ICD-10-CM

## 2022-10-18 PROCEDURE — 97110 THERAPEUTIC EXERCISES: CPT | Performed by: PHYSICAL THERAPIST

## 2022-10-18 PROCEDURE — 97140 MANUAL THERAPY 1/> REGIONS: CPT | Performed by: PHYSICAL THERAPIST

## 2022-10-18 PROCEDURE — 97112 NEUROMUSCULAR REEDUCATION: CPT | Performed by: PHYSICAL THERAPIST

## 2022-10-18 PROCEDURE — 97530 THERAPEUTIC ACTIVITIES: CPT | Performed by: PHYSICAL THERAPIST

## 2022-10-18 NOTE — PROGRESS NOTES
Daily Note     Today's date: 10/18/2022  Patient name: Nuzhat Watson  : 1985  MRN: [id number]  Referring provider: Christoph Siddiqui DO  Dx:   Encounter Diagnosis     ICD-10-CM    1  Pelvic floor dysfunction in female  M62 89    2  Neuralgia of left pudendal nerve  G58 8                   Subjective: Pt noted a lot of left sciatic nerve irritation and new onset of left knee pain last night and this morning  Objective: See treatment diary below      Assessment: Instructed pt on how to use massage balls over left glut for self trigger point release to address radicular sx  Added standing hip flexor stretch and supine SKC and butterfly stretch without complaints of pain reported  Reviewed seated TA and added standing hip abd with appropriate challenge noted  Pt tolerated MFR to lower abdomen with mild relief noted  Provided updated HEP  Patient would benefit from continued PT      Plan: Continue per plan of care        Precautions: history of pelvic pain  Focus: pudendal nerve mobs, PFM strengthening/relaxation, prepare for labor    [facility] (print and volodymyr)  Manuals 10/4 10/18           STM PFM nv as max            TPR to left ITB and lat quad  MONET                                     Neuro Re-Ed             Seated TA 1"x5 reviewed           Seated kegel nv            Seated Hip add             PFM relaxation  In supine  And sitting           Standing hip abd (leaning forward for TA)  5x ea           MFR to lower abdomen  MONET                        Ther Ex             Seated happy baby stretch 30"x3            Supine butterfly stretch  30"x3           Supine Happy baby stretch nv            Quadruped belly hang nv 3x           OI stretch             Piriformis stretch nv            SKC  30"x3           Standing hip flexor stretch                          Ther Activity             Self-massage with ball on glut  instructed                        Gait Training                                       Modalities

## 2022-10-25 ENCOUNTER — OFFICE VISIT (OUTPATIENT)
Dept: PHYSICAL THERAPY | Facility: CLINIC | Age: 37
End: 2022-10-25
Payer: COMMERCIAL

## 2022-10-25 DIAGNOSIS — G58.8 NEURALGIA OF LEFT PUDENDAL NERVE: ICD-10-CM

## 2022-10-25 DIAGNOSIS — M62.89 PELVIC FLOOR DYSFUNCTION IN FEMALE: Primary | ICD-10-CM

## 2022-10-25 PROCEDURE — 97112 NEUROMUSCULAR REEDUCATION: CPT | Performed by: PHYSICAL THERAPIST

## 2022-10-25 PROCEDURE — 97140 MANUAL THERAPY 1/> REGIONS: CPT | Performed by: PHYSICAL THERAPIST

## 2022-10-25 PROCEDURE — 97110 THERAPEUTIC EXERCISES: CPT | Performed by: PHYSICAL THERAPIST

## 2022-10-25 NOTE — PROGRESS NOTES
Daily Note     Today's date: 10/25/2022  Patient name: Sadia Britt  : 1985  MRN: [id number]  Referring provider: Osmar Robb DO  Dx:   Encounter Diagnosis     ICD-10-CM    1  Pelvic floor dysfunction in female  M62 89    2  Neuralgia of left pudendal nerve  G58 8                   Subjective: Pt reports performing stretches and ball trigger point release to the glut at bedtime helps calm down discomfort to sleep better  Pt has started wearing SI belt to increase pelvic stability  Objective: See treatment diary below      Assessment: Pt reports not change in anterior pelvic pressure with seated happy baby stretch  Pt noted decrease in anterior pelvic pressure following visceral mobs to lower abdomen  Reviewed seated TA and added s/l clamshells neutral to strengthen glut med  Pt noted appropriate challenge without back pain  Patient would benefit from continued PT      Plan: Continue per plan of care        Precautions: history of pelvic pain  Focus: pudendal nerve mobs, PFM strengthening/relaxation, prepare for labor    [facility]  Manuals 10/4 10/18 10/25          STM PFM nv as max            TPR to left ITB and lat quad  MONET           Visceral mob of bladder region/round ligament   MONET                       Neuro Re-Ed             Seated TA 1"x5 reviewed           Seated kegel nv            Seated Hip add             PFM relaxation  In supine  And sitting performed          Standing hip abd (leaning forward for TA)  5x ea           S/l clamshell neutral   10x2 ea          Ther Ex             Seated happy baby stretch 30"x3            Supine butterfly stretch  30"x3 30"x2          Supine Happy baby stretch nv            Quadruped belly hang nv 3x           OI stretch             Piriformis stretch nv            SKC  30"x3           Standing hip flexor stretch                          Ther Activity             Self-massage with ball on glut  instructed                        Gait Training                                       Modalities

## 2022-11-01 ENCOUNTER — OFFICE VISIT (OUTPATIENT)
Dept: PHYSICAL THERAPY | Facility: CLINIC | Age: 37
End: 2022-11-01

## 2022-11-01 DIAGNOSIS — M62.89 PELVIC FLOOR DYSFUNCTION IN FEMALE: Primary | ICD-10-CM

## 2022-11-01 DIAGNOSIS — G58.8 NEURALGIA OF LEFT PUDENDAL NERVE: ICD-10-CM

## 2022-11-01 NOTE — PROGRESS NOTES
Daily Note     Today's date: 2022  Patient name: Alona Bar  : 1985  MRN: [id number]  Referring provider: Bethany Wilhelm DO  Dx:   Encounter Diagnosis     ICD-10-CM    1  Pelvic floor dysfunction in female  M62 89    2  Neuralgia of left pudendal nerve  G58 8                   Subjective: Pt reported temporary relief in round lignemnt discomfort following last tx session  Pt notes she needs to focus more on strengthening instead of just stretches has she has been  Objective: See treatment diary below      Assessment: Instructed pt in quadruped TA and pelvic tilts/rocking on pball  Demonstrated decrease in muscle tension in left glut/PFM with STM/TPR, but pt continues to note left side sacral muscle tension/pain  Patient would benefit from continued PT      Plan: Continue per plan of care        Precautions: history of pelvic pain  Focus: pudendal nerve mobs, PFM strengthening/relaxation, prepare for labor    [facility] (print)  Manuals 10/4 10/18 10/25 11/1         STM PFM nv as max            TPR to left ITB and lat quad  MONET           Visceral mob of bladder region/round ligament   MONET MONET         STM to glut/PFM externally in s/l    MONET         Neuro Re-Ed             Seated TA 1"x5 reviewed  On pball  1"x10         Seated kegel nv            Seated Hip add             PFM relaxation  In supine  And sitting performed          Standing hip abd (leaning forward for TA)  5x ea           S/l clamshell neutral   10x2 ea 10x L         Quadruped TA    10"x5         Ther Ex             Seated happy baby stretch 30"x3            Supine butterfly stretch  30"x3 30"x2          Supine Happy baby stretch nv            Quadruped belly hang nv 3x           OI stretch             Piriformis stretch nv            SKC  30"x3           Standing hip flexor stretch                          Ther Activity             Self-massage with ball on glut  instructed reviewed                       Gait Training Modalities

## 2022-11-08 ENCOUNTER — OFFICE VISIT (OUTPATIENT)
Dept: PHYSICAL THERAPY | Facility: CLINIC | Age: 37
End: 2022-11-08

## 2022-11-08 DIAGNOSIS — M62.89 PELVIC FLOOR DYSFUNCTION IN FEMALE: Primary | ICD-10-CM

## 2022-11-08 DIAGNOSIS — G58.8 NEURALGIA OF LEFT PUDENDAL NERVE: ICD-10-CM

## 2022-11-08 NOTE — PROGRESS NOTES
Daily Note     Today's date: 2022  Patient name: Higinio Figueroa  : 1985  MRN: [id number]  Referring provider: Dex Jarquin DO  Dx:   Encounter Diagnosis     ICD-10-CM    1  Pelvic floor dysfunction in female  M62 89    2  Neuralgia of left pudendal nerve  G58 8                   Subjective: Pt continues to experience lower back and glut pain during pregnancy  Pt reports focusing on stretches to relieve pain, but less focus on LE/core strengthening as prescribed  Objective: See treatment diary below      Assessment: Added TB hip abd during seated TA, with sit to stand, and side stepping, pt noted fatigue and challenge in glut  Pt noted decrease in left side glut/back pain with VFM to iliopsoas/round ligament  Patient would benefit from continued PT      Plan: Continue per plan of care        Precautions: history of pelvic pain  Focus: pudendal nerve mobs, PFM strengthening/relaxation, prepare for labor    [facility] (print)  Manuals 10/4 10/18 10/25 11/1 11/8        STM PFM nv as max            TPR to left ITB and lat quad  MONET           Visceral mob of bladder region/round ligament   MONET MONET MONET b/l in supine        STM to glut/PFM externally in s/l    MONET         Neuro Re-Ed             Seated TA 1"x5 reviewed  On pball  1"x10         Seated kegel nv            Seated Hip add             PFM relaxation  In supine  And sitting performed          Standing hip abd (leaning forward for TA)  5x ea           S/l clamshell neutral   10x2 ea 10x L 10x ea        Quadruped TA    10"x5         Seated TA with resisted hip Abd     blkTB  10x          Ther Ex             Seated happy baby stretch 30"x3    30"x2        Supine butterfly stretch  30"x3 30"x2          Supine Happy baby stretch nv            Quadruped belly hang nv 3x           OI stretch             Piriformis stretch nv            SKC  30"x3           Standing hip flexor stretch     30"x1 ea                     Ther Activity             Sit to stand with TB hip abd     blkTB  10x        Side stepping with TB     [id number]         Self-massage with ball on glut  instructed reviewed                       Gait Training                                       Modalities

## 2022-11-15 ENCOUNTER — APPOINTMENT (OUTPATIENT)
Dept: PHYSICAL THERAPY | Facility: CLINIC | Age: 37
End: 2022-11-15

## 2022-11-22 ENCOUNTER — APPOINTMENT (OUTPATIENT)
Dept: PHYSICAL THERAPY | Facility: CLINIC | Age: 37
End: 2022-11-22

## 2022-11-29 ENCOUNTER — OFFICE VISIT (OUTPATIENT)
Dept: PHYSICAL THERAPY | Facility: CLINIC | Age: 37
End: 2022-11-29

## 2022-11-29 DIAGNOSIS — G58.8 NEURALGIA OF LEFT PUDENDAL NERVE: ICD-10-CM

## 2022-11-29 DIAGNOSIS — M62.89 PELVIC FLOOR DYSFUNCTION IN FEMALE: Primary | ICD-10-CM

## 2022-11-29 NOTE — PROGRESS NOTES
Daily Note     Today's date: 2022  Patient name: Adina Disla  : 1985  MRN: [id number]  Referring provider: Shari Eason DO  Dx:   Encounter Diagnosis     ICD-10-CM    1  Pelvic floor dysfunction in female  M62 89       2  Neuralgia of left pudendal nerve  G58 8                      Subjective: Pt reports having severe left side abdominal pain last night, but pt reports OB does not believe it was contractions  Pt reports left side abdominal pain has subsided this morning, but still notes left glut pain  Objective: See treatment diary below      Assessment: Focused on relaxation of pelvic floor and glut muscles through VFM  Mild decrease in hip flexor tension noted with VFM to hip flexor b/l  Pt noted decrease in PFM tension with VFM compression to hips, but still noted spasm in left glut  With further VFM to left glut, pt noted overall decrease in tension and pain in left glut  Encouraged pt to focus on glut strengthen and PFM relaxation  Instructed pt ways to relax PFM and breath with pball standing during labor  Patient would benefit from continued PT      Plan: Continue per plan of care        Precautions: history of pelvic pain  Focus: pudendal nerve mobs, PFM strengthening/relaxation, prepare for labor    [facility] (print)  Manuals 10/4 10/18 10/25 11/1 11/8 11/29       STM PFM nv as max            TPR to left ITB and lat quad  MONET           Visceral mob of bladder region/round ligament   MONET MONET MONET b/l in supine [address]       STM to glut/PFM externally in s/l    [address]         Neuro Re-Ed             Seated TA 1"x5 reviewed  On pball  1"x10  seated 1"x10       Seated kegel nv            Seated Hip add             PFM relaxation  In supine  And sitting performed   performed       Standing hip abd (leaning forward for TA)  5x ea           S/l clamshell neutral   10x2 ea 10x L 10x ea HEP       Quadruped TA    10"x5         Seated TA with resisted hip Abd     blkTB  10x          Ther Ex Seated happy baby stretch 30"x3    30"x2        Supine butterfly stretch  30"x3 30"x2          Supine Happy baby stretch nv            Quadruped belly hang nv 3x           OI stretch             Piriformis stretch nv            SKC  30"x3           Standing hip flexor stretch     30"x1 ea                     Ther Activity             Sit to stand with TB hip abd     blkTB  10x HEP       Side stepping with TB     [id number]         Self-massage with ball on glut  instructed reviewed          PFM relaxation during labor      instructed with pball       Belly Lift during exhalation      Seated  instructed       Gait Training                                       Modalities

## 2022-12-06 ENCOUNTER — EVALUATION (OUTPATIENT)
Dept: PHYSICAL THERAPY | Facility: CLINIC | Age: 37
End: 2022-12-06

## 2022-12-06 DIAGNOSIS — M62.89 PELVIC FLOOR DYSFUNCTION IN FEMALE: Primary | ICD-10-CM

## 2022-12-06 DIAGNOSIS — G58.8 NEURALGIA OF LEFT PUDENDAL NERVE: ICD-10-CM

## 2022-12-06 NOTE — PROGRESS NOTES
PT Re-Evaluation     Today's date: 2022  Patient name: Nuzhat Watson  : 1985  MRN: [id number]  Referring provider: Christoph Siddiqui DO  Dx:   Encounter Diagnosis     ICD-10-CM    1  Pelvic floor dysfunction in female  M62 89       2  Neuralgia of left pudendal nerve  G58 8                      Assessment  Assessment details: Pt reports overall decrease in frequency of left pudendal nerve pain, but notes increase in anterior abdominal discomfort as her belly expands during pregnancy  Pt notes overall increase in urinary leakage with coughing and sneezing and increasing voiding during the day and at night recently  Pt admits to not focusing on PFM/core strengthening but on stretches for pain management  Demonstrates increase in PFM tension and decrease in PFM relaxation on the right side  Assessed core and LE strength  Patient would benefit from further skilled pelvic PT to management pelvic pain during pregnancy  Impairments: abnormal muscle tone, abnormal or restricted ROM, impaired physical strength, lacks appropriate home exercise program, pain with function and poor posture     Goals  Impairment Goals upon discharge  1  Improve MMT for pelvic floor muscle (PFM) to greater than or equal to 4/5 in order to improve (lumbopelvic stability, bladder control, bowel control, pelvic organ prolapse) - not met  2  Increase PFM endurance to 5 second hold for 10 reps - not met  3  Improve relaxation of PFM to 100% in 2 seconds - not met  4  Demonstrates appropriate breathing mechanics with pelvic floor relaxation and contraction for improved muscle coordination in 8 weeks - not met    Functional Goals Upon Discharge  1  Pt will be independent with home exercise program, with proper demonstration, rationale and understanding in order to improve functional abilities and quality of life - not met   2   Patient is knowledgeable of postural and pelvic floor relaxation strategies to optimize toileting techniques - not met  3  Decrease nocturia to 1 voids/night for more thorough sleep - not met  4  Patient reports able to cough/laugh/sneeze with 50% less bladder leakage in order to improve quality of life - not met  5  Patient is able to 1 mile without increase in pudendal nerve pain - partially met    Plan  Patient would benefit from: skilled physical therapy  Planned modality interventions: biofeedback  Planned therapy interventions: manual therapy, neuromuscular re-education, patient education, self care, strengthening, stretching, home exercise program and behavior modification  Frequency: 1x week  Duration in weeks: 8  Plan of Care expiration date: 1/31/2023  Treatment plan discussed with: patient        Subjective Evaluation    History of Present Illness  Mechanism of injury: Pt reports daily urinary leakage with coughing and sneezing and for unknown cause      Bladder Function:     Voiding Difficulties comments:     Voiding frequency: every 1-2 hours    Urinary leakage: urine leakage    Urinary leakage aggravated by: coughing and sneezing    Urinary leakage not aggravated by: bending, exercise, standing up and walking to the bathroom    Nocturia (episodes per night): 2-4   Pain  At worst pain rating: 3 (relieved by stretches given by PT)  Location: Pelvic          Objective     Strength/Myotome Testing     Left Hip   Planes of Motion   Flexion: 4- (left glut pain)  Abduction: 4+  External rotation: 4+    Right Hip   Planes of Motion   Flexion: 4-  Abduction: 4  External rotation: 4  Pelvic Floor Exam   Position: supine exam    Pelvic Floor Muscle Exam     Palpation   Moderate increased muscle tension in the bulbospongiosus, ischiocavernosus, super transverse perineal and coccygeus  Severe increased muscle tension in the puborectalis, pubococcygeus and iliococcygeus  Moderate tenderness on right in the bulbospongiosus, ischiocavernosus, super transverse perineal, coccygeus and obturator internus  Severe tenderness on right in the puborectalis, pubococcygeus and iliococcygeus  Minimal tenderness on left in the puborectalis and pubococcygeus  Moderate tenderness on left in the bulbospongiosus, ischiocavernosus and super transverse perineal    Pelvic floor muscle relaxation is incomplete  25% pelvic floor relaxation  3 seconds required for complete relaxation       PERFECT Score   Power right: 2/5 (3 sec hold, 3 reps)  Power left: 3/5 (10 sec hold, 3 reps)             Precautions: history of pelvic pain  Focus: pudendal nerve mobs, PFM strengthening/relaxation, prepare for labor    [facility] (print)  Manuals 10/4 10/18 10/25 11/1 11/8 11/29 12/6      STM PFM nv as max      MONET      TPR to left ITB and lat quad  MONET     TPR to right glut/PFM externally MONET      Visceral mob of bladder region/round ligament   MONET MONET MONET b/l in supine [address]       STM to glut/PFM externally in s/l    [address]         Neuro Re-Ed             Seated TA 1"x5 reviewed  On pball  1"x10  seated 1"x10 reviewed      Seated kegel nv            Seated Hip add             PFM relaxation  In supine  And sitting performed   performed reviewed      Standing hip abd (leaning forward for TA)  5x ea           S/l clamshell neutral   10x2 ea 10x L 10x ea HEP       Quadruped TA    10"x5         Seated TA with resisted hip Abd     blkTB  10x          Ther Ex             Seated happy baby stretch 30"x3    30"x2  reviewed      Supine butterfly stretch  30"x3 30"x2          Supine Happy baby stretch nv            Quadruped belly hang nv 3x           OI stretch             Piriformis stretch nv      seated 30"x1      SKC  30"x3           Standing hip flexor stretch     30"x1 ea        Re-eval       performed      Ther Activity             Sit to stand with TB hip abd     blkTB  10x HEP       Side stepping with TB     [id number]         Self-massage with ball on glut  instructed reviewed          PFM relaxation during labor      instructed with pball       Belly Lift during exhalation Seated  instructed       Gait Training                                       Modalities

## 2022-12-06 NOTE — LETTER
2022    Hong Duran [address]    Patient: Eric Walker   YOB: 1985   Date of Visit: 2022     Encounter Diagnosis     ICD-10-CM    1  Pelvic floor dysfunction in female  M62 89       2  Neuralgia of left pudendal nerve  G58 8           Dear Dr Olamide Moreira:    Thank you for your recent referral of Eric Walker  Please review the attached evaluation summary from Marilu's recent visit  Please verify that you agree with the plan of care by signing the attached order  If you have any questions or concerns, please do not hesitate to call  I sincerely appreciate the opportunity to share in the care of one of your patients and hope to have another opportunity to work with you in the near future  Sincerely,    Tonye Spurling, PT      Referring Provider:      I certify that I have read the below Plan of Care and certify the need for these services furnished under this plan of treatment while under my care  Dennise Rizvi DO  [address] [place] [address]  Via Fax: [phone]          PT Re-Evaluation     Today's date: 2022  Patient name: Eric Walker  : 1985  MRN: [id number]  Referring provider: Tino Garces DO  Dx:   Encounter Diagnosis     ICD-10-CM    1  Pelvic floor dysfunction in female  M62 89       2  Neuralgia of left pudendal nerve  G58 8                      Assessment  Assessment details: Pt reports overall decrease in frequency of left pudendal nerve pain, but notes increase in anterior abdominal discomfort as her belly expands during pregnancy  Pt notes overall increase in urinary leakage with coughing and sneezing and increasing voiding during the day and at night recently  Pt admits to not focusing on PFM/core strengthening but on stretches for pain management  Demonstrates increase in PFM tension and decrease in PFM relaxation on the right side   Assessed core and LE strength  Patient would benefit from further skilled pelvic PT to management pelvic pain during pregnancy  Impairments: abnormal muscle tone, abnormal or restricted ROM, impaired physical strength, lacks appropriate home exercise program, pain with function and poor posture     Goals  Impairment Goals upon discharge  1  Improve MMT for pelvic floor muscle (PFM) to greater than or equal to 4/5 in order to improve (lumbopelvic stability, bladder control, bowel control, pelvic organ prolapse) - not met  2  Increase PFM endurance to 5 second hold for 10 reps - not met  3  Improve relaxation of PFM to 100% in 2 seconds - not met  4  Demonstrates appropriate breathing mechanics with pelvic floor relaxation and contraction for improved muscle coordination in 8 weeks - not met    Functional Goals Upon Discharge  1  Pt will be independent with home exercise program, with proper demonstration, rationale and understanding in order to improve functional abilities and quality of life - not met   2  Patient is knowledgeable of postural and pelvic floor relaxation strategies to optimize toileting techniques - not met  3  Decrease nocturia to 1 voids/night for more thorough sleep - not met  4  Patient reports able to cough/laugh/sneeze with 50% less bladder leakage in order to improve quality of life - not met  5   Patient is able to 1 mile without increase in pudendal nerve pain - partially met    Plan  Patient would benefit from: skilled physical therapy  Planned modality interventions: biofeedback  Planned therapy interventions: manual therapy, neuromuscular re-education, patient education, self care, strengthening, stretching, home exercise program and behavior modification  Frequency: 1x week  Duration in weeks: 8  Plan of Care expiration date: 1/31/2023  Treatment plan discussed with: patient        Subjective Evaluation    History of Present Illness  Mechanism of injury: Pt reports daily urinary leakage with coughing and sneezing and for unknown cause  Bladder Function:     Voiding Difficulties comments:     Voiding frequency: every 1-2 hours    Urinary leakage: urine leakage    Urinary leakage aggravated by: coughing and sneezing    Urinary leakage not aggravated by: bending, exercise, standing up and walking to the bathroom    Nocturia (episodes per night): 2-4   Pain  At worst pain rating: 3 (relieved by stretches given by PT)  Location: Pelvic          Objective     Strength/Myotome Testing     Left Hip   Planes of Motion   Flexion: 4- (left glut pain)  Abduction: 4+  External rotation: 4+    Right Hip   Planes of Motion   Flexion: 4-  Abduction: 4  External rotation: 4  Pelvic Floor Exam   Position: supine exam    Pelvic Floor Muscle Exam     Palpation   Moderate increased muscle tension in the bulbospongiosus, ischiocavernosus, super transverse perineal and coccygeus  Severe increased muscle tension in the puborectalis, pubococcygeus and iliococcygeus  Moderate tenderness on right in the bulbospongiosus, ischiocavernosus, super transverse perineal, coccygeus and obturator internus  Severe tenderness on right in the puborectalis, pubococcygeus and iliococcygeus  Minimal tenderness on left in the puborectalis and pubococcygeus  Moderate tenderness on left in the bulbospongiosus, ischiocavernosus and super transverse perineal    Pelvic floor muscle relaxation is incomplete  25% pelvic floor relaxation  3 seconds required for complete relaxation       PERFECT Score   Power right: 2/5 (3 sec hold, 3 reps)  Power left: 3/5 (10 sec hold, 3 reps)            Precautions: history of pelvic pain  Focus: pudendal nerve mobs, PFM strengthening/relaxation, prepare for labor    [facility] (print)  Manuals 10/4 10/18 10/25 11/1 11/8 11/29 12/6      STM PFM nv as max      MONET      TPR to left ITB and lat quad  MONET     TPR to right glut/PFM externally MONET      Visceral mob of bladder region/round ligament   [address] MONET b/l in supine [address] STM to glut/PFM externally in s/l    MONET         Neuro Re-Ed             Seated TA 1"x5 reviewed  On pball  1"x10  seated 1"x10 reviewed      Seated kegel nv            Seated Hip add             PFM relaxation  In supine  And sitting performed   performed reviewed      Standing hip abd (leaning forward for TA)  5x ea           S/l clamshell neutral   10x2 ea 10x L 10x ea HEP       Quadruped TA    10"x5         Seated TA with resisted hip Abd     blkTB  10x          Ther Ex             Seated happy baby stretch 30"x3    30"x2  reviewed      Supine butterfly stretch  30"x3 30"x2          Supine Happy baby stretch nv            Quadruped belly hang nv 3x           OI stretch             Piriformis stretch nv      seated 30"x1      SKC  30"x3           Standing hip flexor stretch     30"x1 ea        Re-eval       performed      Ther Activity             Sit to stand with TB hip abd     blkTB  10x HEP       Side stepping with TB     [id number]         Self-massage with ball on glut  instructed reviewed          PFM relaxation during labor      instructed with pball       Belly Lift during exhalation      Seated  instructed       Gait Training                                       Modalities

## 2022-12-20 ENCOUNTER — OFFICE VISIT (OUTPATIENT)
Dept: PHYSICAL THERAPY | Facility: CLINIC | Age: 37
End: 2022-12-20

## 2022-12-20 DIAGNOSIS — M62.89 PELVIC FLOOR DYSFUNCTION IN FEMALE: Primary | ICD-10-CM

## 2022-12-20 DIAGNOSIS — G58.8 NEURALGIA OF LEFT PUDENDAL NERVE: ICD-10-CM

## 2022-12-20 NOTE — PROGRESS NOTES
Daily Note     Today's date: 2022  Patient name: Minesh Womack  : 1985  MRN: [id number]  Referring provider: Elena Gonzalez DO  Dx:   Encounter Diagnosis     ICD-10-CM    1  Pelvic floor dysfunction in female  M62 89       2  Neuralgia of left pudendal nerve  G58 8                      Subjective: Pt reports some relief in pain following last session, but notes pain returning this morning when rising  Objective: See treatment diary below      Assessment: Pt noted challenge with added sit to stand with TA+kegel  Demonstrates severe spasm in left glut/PFM  Following manuals, pt noted decrease in left PFM tension  Reviewed stretches and encouraged pt to continue to focus on TA+PFM strengthening and stretches  Pt requested to be discharge from therapy at this time, but plans to return after baby for postpartum pelvic therapy  Plan: Discharge to [facility]       Precautions: history of pelvic pain  Focus: pudendal nerve mobs, PFM strengthening/relaxation, prepare for labor    [facility]  Manuals 10/4 10/18 10/25 11/1 11/8 11/29 12/6 12/20     STM PFM nv as max      [address]      TPR to left ITB and lat quad  MONET     TPR to right glut/PFM externally MONET      Visceral mob of bladder region/round ligament   MONET MONET MONET b/l in supine [address]       STM to glut/PFM externally in s/l    [address]    MONET     Neuro Re-Ed             Seated TA 1"x5 reviewed  On pball  1"x10  seated 1"x10 reviewed      Seated TA+kegel        3"x10     Seated Hip add             PFM relaxation  In supine  And sitting performed   performed reviewed      Standing hip abd (leaning forward for TA)  5x ea           S/l clamshell neutral   10x2 ea 10x L 10x ea HEP  10x ea     Quadruped TA    10"x5         Seated TA with resisted hip Abd     blkTB  10x          Ther Ex             Seated happy baby stretch 30"x3    30"x2  reviewed      Supine butterfly stretch  30"x3 30"x2     30"x3     Supine Happy baby stretch nv       30"x3     Quadruped belly hang nv 3x           OI stretch             Piriformis stretch nv      seated 30"x1      SKC  30"x3           Standing hip flexor stretch     30"x1 ea        Re-eval       performed      Ther Activity             Sit to stand with TB hip abd     blkTB  10x HEP       Sit to stand with TA+kegel        10x     Side stepping with TB     [id number]         Self-massage with ball on glut  instructed reviewed          PFM relaxation during labor      instructed with pball       Belly Lift during exhalation      Seated  instructed       Gait Training                                       Modalities

## 2023-03-14 ENCOUNTER — EVALUATION (OUTPATIENT)
Dept: PHYSICAL THERAPY | Facility: CLINIC | Age: 38
End: 2023-03-14

## 2023-03-14 DIAGNOSIS — M25.552 CHRONIC LEFT HIP PAIN: ICD-10-CM

## 2023-03-14 DIAGNOSIS — G89.29 CHRONIC LEFT HIP PAIN: ICD-10-CM

## 2023-03-14 DIAGNOSIS — M62.89 PELVIC FLOOR DYSFUNCTION IN FEMALE: Primary | ICD-10-CM

## 2023-03-14 NOTE — LETTER
2023    [facility]  [address]    Patient: Meryl Early   YOB: 1985   Date of Visit: 3/14/2023     Encounter Diagnosis     ICD-10-CM    1  Pelvic floor dysfunction in female  M62 89       2  Chronic left hip pain  M25 552     G89 29           Dear Dr Haque Reach:    Thank you for your recent referral of Meryl Early  Please review the attached evaluation summary from Marilu's recent visit  Please verify that you agree with the plan of care by signing the attached order  If you have any questions or concerns, please do not hesitate to call  I sincerely appreciate the opportunity to share in the care of one of your patients and hope to have another opportunity to work with you in the near future  Sincerely,    Leonor Arvizu, PT      Referring Provider:      I certify that I have read the below Plan of Care and certify the need for these services furnished under this plan of treatment while under my care  Feng Fong DO  [address]  Via Fax: [phone]          PT Evaluation     Today's date: 3/14/2023  Patient name: Meryl Early  : 1985  MRN: [id number]  Referring provider: Siena Nicole DO  Dx:   Encounter Diagnosis     ICD-10-CM    1  Pelvic floor dysfunction in female  M62 89       2  Chronic left hip pain  M25 552     G89 29                      Assessment  Assessment details: Meryl Early is a 40 y o  female who is returning to pelvic PT with concerns of persistent left glut pain and pelvic floor dysfunction following  of 2nd child  Pt is  with history of pelvic pain following vaginal delivery of first child  Pelvic floor anatomy was explained to patient utilizing a pelvic model and examination technique was discussed, including all risks and precautions   Patient provided verbal and written consent for internal pelvic floor muscle assessment prior to examination  Demonstrates decrease in core and LE strength, decrease in pelvic floor muscle (PFM) strength/endurance, decrease PFM relaxation, and tenderness to palpitation in PFM and gluts  Therapeutic activities performed upon examination included education pelvic floor anatomy  Neuromuscular re-education exercises include instruction and cues on appropriate contraction pelvic floor muscles (PFM) and transverse abdominal muscle (TA) without accessory muscles and relaxation of PFM through diaphragm breathing and stretches  Patient presents with the below outlined deficits and is appropriate for skilled physical therapy in order to address deficits and ultimately meet goal of independent self management of condition  Thank you for this referral!  Impairments: abnormal muscle tone, abnormal or restricted ROM, impaired physical strength, lacks appropriate home exercise program, pain with function and poor posture   Barriers to therapy: Inconsistency with HEP in the past    Goals  Impairment Goals upon discharge  - Decrease left glut pain to 0-2/10 with sitting for 1 hour  - Decrease back pain to 0-2/10 with ADLs  - Improve pelvic stability with resisted SLR to 4+/5  - Improve MMT for pelvic floor muscle (PFM) to greater than or equal to 4/5 in order to improve lumbopelvic stability and bladder control  - Increase PFM endurance to 10 second hold for 10 reps  - Improve relaxation of PFM to 100% in 2 seconds  - Demonstrates appropriate breathing mechanics with pelvic floor relaxation and contraction for improved muscle coordination in 8 weeks    Functional Goals Upon Discharge  - Pt will be independent with home exercise program, with proper demonstration, rationale and understanding in order to improve functional abilities and quality of life     - Patient is knowledgeable of postural and pelvic floor relaxation strategies to optimize toileting techniques  - Patient has implemented urge suppression strategies throughout the day and reports average voiding interval is 2-3 hours upon discharge in order to have more functional bladder functioning  - Patient reports decrease nocturia to 0-1 voids/night for more thorough sleep  - Patient is able to sit for 1 hour at [employer] without increase in left glut pain      Plan  Patient would benefit from: women's health eval and skilled physical therapy  Planned modality interventions: biofeedback  Planned therapy interventions: manual therapy, neuromuscular re-education, patient education, self care, strengthening, stretching, home exercise program and behavior modification  Frequency: 1x week  Duration in weeks: 8  Plan of Care expiration date: 2023  Treatment plan discussed with: patient        PT Pelvic Floor Subjective:   History of Present Illness:   39 yo female returning to pelvic PT to address persistent left glut pain and pelvic floor dysfunction following birth of her 2nd child on 23 by caesarian following failed vaginal delivery due to baby's position  Pt reports left glut pain affecting sitting tolerance and lumbar pain affecting ADLs  Pt denies urinary leakage/incontinence, but notes needing to strain to empty bladder  Lumbar Pain:  At worst: 6/10 (lifting and holding baby and ADLs)  At best: 2/10   OB/ gyn History    Gestational History:     Prior Pregnancy: Yes      Number of term pregnancies: 2    Delivery Type:  section      Number of vaginal deliveries: 1    Number of caesarian sections: 1  Bladder Function:     Voiding Difficulties positive for: straining      Voiding Difficulties comments:     Voiding frequency: every 1-2 hours    Urinary leakage: no urine leakage    Urinary leakage not aggravated by: coughing and sneezing    Nocturia (episodes per night): 3    Painful urination: No      Intake (ounces):  Water: 40, Coffee: 16,   Incontinence Management:     Pads/Diaper Use:  None    Patient has attempted at least 4 weeks of independent pelvic floor strengthening exercises without a resolution of symptoms  Bowel Function:     Voiding DIfficulties: urgency      Bowel frequency: daily    Stool softener use: no stool softeners  Sexual Function:     Pain during intercourse: Yes    Pain:     At best pain ratin    At worst pain ratin (prolong sitting on hard surface)    Location:  Left Glut    Onset:  More than 2 years ago    Quality:  Dull ache  Treatments:     Previous treatment:  Physical therapy  Patient Goals:     Patient goals for therapy:  Improved quality of life, improved pain management, improved comfort and decreased pain      Objective     Strength/Myotome Testing     Left Hip   Planes of Motion   Flexion: 4 (Pelvic instabilty)  Extension: 4-  Abduction: 4+  Adduction: 3+  External rotation: 4    Right Hip   Planes of Motion   Flexion: 4+ (mild pelvic instability)  Extension: 4  Abduction: 4+  Adduction: 4-  External rotation: 4+  Pelvic Floor Exam   Position: supine exam    General Perineum Exam:   Positive for no pelvic organ prolapse at rest      Visual Inspection of Perineum:   Excursion of perineal body in cephalad direction with contraction of pelvic floor muscles (PFM): fair   Excursion of perineal body in caudal direction with relaxation of pelvic floor muscles (PFM): fair   Involuntary contraction with coughing: yes  Involuntary relaxation with bearing down: yes    Pelvic Organ Prolapse   Position: hook-lying  At rest: none  With bearing down: none    Pelvic Floor Muscle Exam     Palpation   Minimal increased muscle tension in the bulbospongiosus, ischiocavernosus and super transverse perineal  Severe increased muscle tension in the puborectalis, pubococcygeus, iliococcygeus, coccygeus and obturator internus  No tenderness on right in the bulbospongiosus, ischiocavernosus and super transverse perineal  No tenderness on left in the bulbospongiosus, ischiocavernosus and super transverse perineal    Breathing pattern with contraction: apical  Pelvic floor muscle relaxation is delayed and incomplete  25% pelvic floor relaxation  5 seconds required for complete relaxation       PERFECT Score   Power right: 2/5 (3 sec hold, 3 reps)  Power left: 2/5 (5 sec hold, 5 reps)        Flowsheet Rows    Flowsheet Row Most Recent Value   PT/OT G-Codes    Current Score 46   Projected Score 0            Precautions: history of pelvic pain, difficulty emptying bladder post   Focus: PFM relaxation, core strengthening, urge suppression/bladder retraining    [facility]  Manuals 3/14                                                                Neuro Re-Ed             Quad pelvic tilt 10"x3            Supine TA (without PFM) nv            Supine BKFO                                                                 Ther Ex             Seated Happy Baby stretch 30"x1            Supine Happy Baby stretch                                                                                           Ther Activity             Urge suppression             Bladder retraining             Gait Training                                       Modalities

## 2023-03-30 ENCOUNTER — OFFICE VISIT (OUTPATIENT)
Dept: PHYSICAL THERAPY | Facility: CLINIC | Age: 38
End: 2023-03-30

## 2023-03-30 DIAGNOSIS — M62.89 PELVIC FLOOR DYSFUNCTION IN FEMALE: Primary | ICD-10-CM

## 2023-03-30 DIAGNOSIS — G89.29 CHRONIC LEFT HIP PAIN: ICD-10-CM

## 2023-03-30 DIAGNOSIS — M25.552 CHRONIC LEFT HIP PAIN: ICD-10-CM

## 2023-03-30 NOTE — PROGRESS NOTES
"Daily Note     Today's date: 3/30/2023  Patient name: Tim Fowler  : 1985  MRN: [id number]  Referring provider: Jorge Hunter DO  Dx:   Encounter Diagnosis     ICD-10-CM    1  Pelvic floor dysfunction in female  M62 89       2  Chronic left hip pain  M25 552     G89 29                      Subjective: Pt reports first episode of fecal incontinence this week  Pt noted bowel urgency, but when attempted to place baby down to go to the bathroom  Objective: See treatment diary below      Assessment: Pt noted decrease in PFM tension from 3/10 to 1/10 following seated happy baby stretch, but still notes left side lower back pain  Performed TPR to left glut/lumbar region and external PFM with notable muscle spasm noted in left glut and multifidus  Demonstrated ttp throughout lumbar and sacrum with PA mobs, but tolerated UPA mobs grade 1-2 mobs and rotation MFR to sacrum  Added prone press-ups with no increase in pain reported  Patient would benefit from continued PT      Plan: Continue per plan of care        Precautions: history of pelvic pain, difficulty emptying bladder post   Focus: PFM relaxation, core strengthening, urge suppression/bladder retraining      Manuals 3/14 3/30           External PFM TPR in s/l nv MONET           TPR to glut/lumbar  S/l  MONET           Lumbar UPA mobs  Prone  MONET           Sacral MFR  Rotation  MONET           Neuro Re-Ed             Quad pelvic tilt 10\"x3            Supine TA (without PFM) nv 1\"x5           Supine BKFO             S/l Clamshells  7x ea fatigue           S/l neutral clamshells  7x ea  fatigue                                     Ther Ex             Seated Happy Baby stretch 30\"x1 30\"x3           Supine Happy Baby stretch             Prone Press-up  10x                                                                            Ther Activity             Urge suppression  nv           Bladder retraining  nv           Gait Training                            " Modalities

## 2023-05-02 ENCOUNTER — OFFICE VISIT (OUTPATIENT)
Dept: PHYSICAL THERAPY | Facility: CLINIC | Age: 38
End: 2023-05-02

## 2023-05-02 DIAGNOSIS — M25.552 CHRONIC LEFT HIP PAIN: ICD-10-CM

## 2023-05-02 DIAGNOSIS — M62.89 PELVIC FLOOR DYSFUNCTION IN FEMALE: Primary | ICD-10-CM

## 2023-05-02 DIAGNOSIS — G89.29 CHRONIC LEFT HIP PAIN: ICD-10-CM

## 2023-05-02 NOTE — PROGRESS NOTES
"Daily Note     Today's date: 2023  Patient name: Lisbeth Prado  : 1985  MRN: [id number]  Referring provider: Vero Soliz DO  Dx:   Encounter Diagnosis     ICD-10-CM    1  Pelvic floor dysfunction in female  M62 89       2  Chronic left hip pain  M25 552     G89 29                      Subjective: Pt reports focusing on stretches for relief in pain, but has not prioritized core and LE strengthening  Pt notes greatest pain in lumbar-sacral region and left tail bone/glut  Objective: See treatment diary below      Assessment: Pt c/o left tail bone pain with quadruped TA, even following lumbar manuals and TPR to PFM  Pt tolerated added supine BKFO and TA marching with core challenge and without pain  Patient would benefit from continued PT  Plan: Continue per plan of care        Precautions: history of pelvic pain, difficulty emptying bladder post   Focus: PFM relaxation, core strengthening, urge suppression/bladder retraining      Manuals 3/14 3/30 5/          External PFM TPR in s/l nv MONET MONET          TPR to glut/lumbar  S/l  MONET           Lumbar UPA mobs  Prone  MONET Prone MONET          Sacral MFR  Rotation  MONET Prone MONET          Neuro Re-Ed             Quad pelvic tilt 10\"x3  Tail bone pain*          Supine TA (without PFM) nv 1\"x5           Supine BKFO   10x ea          TA Marches   10x ea          S/l Clamshells  7x ea fatigue 10x ea fatigue          S/l neutral clamshells  7x ea  fatigue 10x ea fatigue                                    Ther Ex             Seated Happy Baby stretch 30\"x1 30\"x3           Supine Happy Baby stretch             Prone Press-up  10x           Piriformis stretch   30\"x2                                                              Ther Activity             Urge suppression  nv           Bladder retraining  nv           Gait Training                                       Modalities                                            "

## 2023-05-09 ENCOUNTER — OFFICE VISIT (OUTPATIENT)
Dept: PHYSICAL THERAPY | Facility: CLINIC | Age: 38
End: 2023-05-09

## 2023-05-09 DIAGNOSIS — M62.89 PELVIC FLOOR DYSFUNCTION IN FEMALE: Primary | ICD-10-CM

## 2023-05-09 DIAGNOSIS — M25.552 CHRONIC LEFT HIP PAIN: ICD-10-CM

## 2023-05-09 DIAGNOSIS — G89.29 CHRONIC LEFT HIP PAIN: ICD-10-CM

## 2023-05-09 NOTE — PROGRESS NOTES
"Daily Note     Today's date: 2023  Patient name: Russel Neil  : 1985  MRN: [id number]  Referring provider: Nahed Padilla DO  Dx:   Encounter Diagnosis     ICD-10-CM    1  Pelvic floor dysfunction in female  M62 89       2  Chronic left hip pain  M25 552     G89 29                      Subjective: Pt reports new onset of severe left vaginal pain with urinary urge irritation since   Pt saw OB who cleared her from a UTI or vaginal lesion and encouraged pt to coming to pelvic PT today  Objective: See treatment diary below      Assessment: Demonstrates spasm in left PFM, but pt denied decrease in vaginal pain with TPR to levator ani and hip adductor  Left bulbocavernosis muscle spasm noted vaginally with some relief with low-load prolong stretch and STM  Encouraged pt to use vaginal dilators and light contract-relax to help decrease muscle spasm contributing to symptoms  Pt noted overall decrease in irritation around urethrae, but still experiencing pain  Patient would benefit from continued PT      Plan: Continue per plan of care        Precautions: history of pelvic pain, difficulty emptying bladder post   Focus: PFM relaxation, core strengthening, urge suppression/bladder retraining      Manuals 3/14 3/30 5/2 5/9         External PFM TPR in s/l nv MONET MONET MONET         TPR to hip adductor    MONET         STM to left bulbocavernosis + transverse perineal    MONET         TPR to glut/lumbar  S/l  MONET           Lumbar UPA mobs  Prone  MONET Prone MONET          Sacral MFR  Rotation  MONET Prone MONET          Neuro Re-Ed             Quad pelvic tilt 10\"x3  Tail bone pain*          Supine TA (without PFM) nv 1\"x5           Supine BKFO   10x ea          TA Marches   10x ea          S/l Clamshells  7x ea fatigue 10x ea fatigue          S/l neutral clamshells  7x ea  fatigue 10x ea fatigue                                    Ther Ex             Seated Happy Baby stretch 30\"x1 30\"x3           Supine Happy Baby " "stretch             Prone Press-up  10x           Piriformis stretch   30\"x2                                                              Ther Activity             Urge suppression  nv           Bladder retraining  nv           Gait Training                                       Modalities                                            "

## 2023-05-11 ENCOUNTER — OFFICE VISIT (OUTPATIENT)
Dept: PHYSICAL THERAPY | Facility: CLINIC | Age: 38
End: 2023-05-11

## 2023-05-11 DIAGNOSIS — M25.552 CHRONIC LEFT HIP PAIN: ICD-10-CM

## 2023-05-11 DIAGNOSIS — G89.29 CHRONIC LEFT HIP PAIN: ICD-10-CM

## 2023-05-11 DIAGNOSIS — M62.89 PELVIC FLOOR DYSFUNCTION IN FEMALE: Primary | ICD-10-CM

## 2023-05-11 NOTE — PROGRESS NOTES
"Daily Note     Today's date: 2023  Patient name: Juan Pratt  : 1985  MRN: [id number]  Referring provider: Kavita Gamez DO  Dx:   Encounter Diagnosis     ICD-10-CM    1  Pelvic floor dysfunction in female  M62 89       2  Chronic left hip pain  M25 552     G89 29                      Subjective: Pt reports /10 PFM tension/pain pre tx, overall decrease in pain following last visit  Objective: See treatment diary below      Assessment: Demonstrated decrease in left bulbocavernous spasm compared to last visit  Pt tolerated STM to left bulbocavernous to mild decrease in tension  Severe ttp noted along left hip adductor tendon  Instructed pt in standing hip adductor stretch  Patient would benefit from continued PT      Plan: Continue per plan of care        Precautions: history of pelvic pain, difficulty emptying bladder post   Focus: PFM relaxation, core strengthening, urge suppression/bladder retraining      Manuals 3/14 3/30 5 5        External PFM TPR in s/l nv MONET MONET MONET         TPR to hip adductor    MONET MONET        STM to left bulbocavernosis + transverse perineal    MONET MONET        TPR to glut/lumbar  S/l  MONET           Lumbar UPA mobs  Prone  MONET Prone MONET          Sacral MFR  Rotation  MONET Prone MONET          Neuro Re-Ed             Quad pelvic tilt 10\"x3  Tail bone pain*          Supine TA (without PFM) nv 1\"x5           Supine BKFO   10x ea          TA Marches   10x ea          S/l Clamshells  7x ea fatigue 10x ea fatigue          S/l neutral clamshells  7x ea  fatigue 10x ea fatigue                                    Ther Ex             Seated Happy Baby stretch 30\"x1 30\"x3           Supine Happy Baby stretch             Prone Press-up  10x           Piriformis stretch   30\"x2          Standing Hip Adductor Stretch     10\"x5 ea                                               Ther Activity             Urge suppression  nv           Bladder retraining  nv           Gait Training     " Modalities

## 2023-05-16 ENCOUNTER — EVALUATION (OUTPATIENT)
Dept: PHYSICAL THERAPY | Facility: CLINIC | Age: 38
End: 2023-05-16

## 2023-05-16 DIAGNOSIS — M25.552 CHRONIC LEFT HIP PAIN: ICD-10-CM

## 2023-05-16 DIAGNOSIS — G89.29 CHRONIC LEFT HIP PAIN: ICD-10-CM

## 2023-05-16 DIAGNOSIS — M62.89 PELVIC FLOOR DYSFUNCTION IN FEMALE: Primary | ICD-10-CM

## 2023-05-16 NOTE — PROGRESS NOTES
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  Demonstrates increase in PFM relaxation, but moderates spasm noted along left bulbocavernous muscle  Minimal improvement in core, LE, and PFM strength/endurance due to lack of consistency with daily strengthening HEP  Patient desires to continue pelvic therapy to address back/left glut pain and weakness to maximize functional capacity    Impairments: abnormal muscle tone, abnormal or restricted ROM, impaired physical strength, lacks appropriate home exercise program, pain with function and poor posture     Goals  Impairment Goals upon discharge  - Decrease left glut pain to 0-2/10 with sitting for 1 hour - not met  - Decrease back pain to 0-2/10 with ADLs - not met  - Improve pelvic stability with resisted SLR to 4+/5  - Improve MMT for pelvic floor muscle (PFM) to greater than or equal to 4/5 in order to improve lumbopelvic stability and bladder control  - Increase PFM endurance to 10 second hold for 10 reps  - Improve relaxation of PFM to 100% in 2 seconds  - Demonstrates appropriate breathing mechanics with pelvic floor relaxation and contraction for improved muscle coordination in 8 weeks    Functional Goals Upon Discharge  - Pt will be independent with home exercise program, with proper demonstration, rationale and understanding in order to improve functional abilities and quality of life  - Patient is knowledgeable of postural and pelvic floor relaxation strategies to optimize toileting techniques - partially met  - Patient has implemented urge suppression strategies throughout the day and reports average voiding interval is 2-3 hours upon discharge in order to have more functional bladder functioning - partially met  - Patient reports decrease nocturia to 0-1 voids/night for more thorough sleep - partially met  - Patient is able to sit for 1 hour at [employer] without increase in left glut pain - not met    Plan  Patient would benefit from: women's health eval and skilled physical therapy  Planned modality interventions: biofeedback  Planned therapy interventions: manual therapy, neuromuscular re-education, patient education, self care, strengthening, stretching, home exercise program and behavior modification  Frequency: 1x week  Duration in weeks: 8  Plan of Care beginning date: 5/9/2023  Plan of Care expiration date: 7/4/2023  Treatment plan discussed with: patient        Subjective Evaluation    History of Present Illness  Mechanism of injury: Pt reports new onset of left vaginal/urethra pain with urinary urge irritation that began on 5/7/23  Pt saw OB who cleared her from any infection  Pt notes decrease in pain and urgency since starting manuals to release severe spasm noted in left bulbocavernous muscle  Urethra Pain:  At worst: 8/10 (pain and urge to urinate)  At best: 2/10    Lumbar Pain:  At worst: 3/10 (lifting and holding baby and ADLs)  At best: 1/10     Bladder Function:     Voiding Difficulties positive for: straining      Voiding Difficulties comments:     Voiding frequency: every 2-3 hours    Urinary leakage: no urine leakage    Urinary leakage not aggravated by: coughing and sneezing    Nocturia (episodes per night): 2 (when up to nurse baby)    Painful urination: No      Intake (ounces):  Water: 40, Coffee: 16,   Incontinence Management:     Pads/Diaper Use: None    Bowel Function:     Voiding DIfficulties: urgency      Bowel frequency: daily    Stool softener use: no stool softeners  Sexual Function:     Pain during intercourse: Yes    Pain  Current pain ratin  At best pain ratin  At worst pain ratin (prolong sitting on hard surface)  Location: Left Glut          Objective     Strength/Myotome Testing     Left Hip   Planes of Motion   Flexion: 4 (Pelvic instabilty)  Extension: 4-  Abduction: 4+  Adduction: 3+  External rotation: 4+    Right Hip   Planes of Motion   Flexion: 4 (Pelvic instabilty)  Extension: 4+  Abduction: 4  Adduction: 2+  External rotation: 4+  Pelvic Floor Exam   Position: supine exam    Visual Inspection of Perineum:   Excursion of perineal body in cephalad direction with contraction of pelvic floor muscles (PFM): fair   Excursion of perineal body in caudal direction with relaxation of pelvic floor muscles (PFM): weak    Pelvic Floor Muscle Exam     Palpation   Minimal increased muscle tension in the super transverse perineal  Moderate increased muscle tension in the bulbospongiosus, ischiocavernosus, puborectalis, pubococcygeus and iliococcygeus  No tenderness on right in the super transverse perineal  Minimal tenderness on right in the bulbospongiosus, ischiocavernosus, puborectalis, pubococcygeus, iliococcygeus and periurethral  Minimal tenderness on left in the super transverse perineal  Moderate tenderness on left in the bulbospongiosus, ischiocavernosus, puborectalis, pubococcygeus, iliococcygeus and periurethral    50% pelvic floor relaxation  5 seconds required for complete relaxation  PERFECT Score   Right power: anterior 2/5 2 sec hold 2 reps, posterior 3/5 10 sec hold 2 reps  Left power: anterior 1/5 5 sec hold, posteroir 2/5 3 sec hold 3 reps             Precautions: history of pelvic pain, difficulty emptying bladder post   Focus: PFM relaxation, core strengthening, urge suppression/bladder "retraining      Manuals 3/14 3/30 5/2 5/9 5/11 5/16       External PFM TPR in s/l nv MONET MONET MONET  nv       TPR to hip adductor    MONET MONET        STM to left bulbocavernosis + transverse perineal    [place]       TPR to glut/lumbar  S/l  MONET           Lumbar UPA mobs  Prone  MONET Prone MONET   MONET       Sacral MFR  Rotation  MONET Prone MONET   MONET       Neuro Re-Ed             Quad pelvic tilt 10\"x3  Tail bone pain*          Supine TA (without PFM) nv 1\"x5           Supine BKFO   10x ea          TA Marches   10x ea          S/l Clamshells  7x ea fatigue 10x ea fatigue          S/l neutral clamshells  7x ea  fatigue 10x ea fatigue                                    Ther Ex             Re-assessment      performed       Seated Happy Baby stretch 30\"x1 30\"x3           Supine Happy Baby stretch             Prone Press-up  10x           Piriformis stretch   30\"x2          Standing Hip Adductor Stretch     10\"x5 ea                                               Ther Activity             Urge suppression  nv           Bladder retraining  nv           Gait Training                                       Modalities                                         "

## 2023-05-16 NOTE — LETTER
May 17, 2023    [facility] [address]    Patient: Matthias Hsieh   YOB: 1985   Date of Visit: 2023     Encounter Diagnosis     ICD-10-CM    1  Pelvic floor dysfunction in female  M62 89       2  Chronic left hip pain  M25 552     G89 29           Dear Dr Davey Vergara:    Thank you for your recent referral of Matthias Hsieh  Please review the attached evaluation summary from Laurren's recent visit  Please verify that you agree with the plan of care by signing the attached order  If you have any questions or concerns, please do not hesitate to call  I sincerely appreciate the opportunity to share in the care of one of your patients and hope to have another opportunity to work with you in the near future  Sincerely,    Rosa Leonardo, PT      Referring Provider:      I certify that I have read the below Plan of Care and certify the need for these services furnished under this plan of treatment while under my care  Cedric Fields DO  [address]  Via Fax: [phone]          PT Re-Evaluation     Today's date: 2023  Patient name: Matthias Hsieh  : 1985  MRN: [id number]  Referring provider: Mahi Conde DO  Dx:   Encounter Diagnosis     ICD-10-CM    1  Pelvic floor dysfunction in female  M62 89       2  Chronic left hip pain  M25 552     G89 29                      Assessment  Assessment details: Pt reports overall improvement in decreasing lower back and glut pain with stretches provided in therapy, but continues to experience pain affecting daily functional capacity  Pt admits inconsistency in performing HEP with minimal focus on strengthening exercises due to 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  Demonstrates increase in PFM relaxation, but moderates spasm noted along left bulbocavernous muscle   Minimal improvement in core, LE, and PFM strength/endurance due to lack of consistency with daily strengthening HEP  Patient desires to continue pelvic therapy to address back/left glut pain and weakness to maximize functional capacity  Impairments: abnormal muscle tone, abnormal or restricted ROM, impaired physical strength, lacks appropriate home exercise program, pain with function and poor posture     Goals  Impairment Goals upon discharge  - Decrease left glut pain to 0-2/10 with sitting for 1 hour - not met  - Decrease back pain to 0-2/10 with ADLs - not met  - Improve pelvic stability with resisted SLR to 4+/5  - Improve MMT for pelvic floor muscle (PFM) to greater than or equal to 4/5 in order to improve lumbopelvic stability and bladder control  - Increase PFM endurance to 10 second hold for 10 reps  - Improve relaxation of PFM to 100% in 2 seconds  - Demonstrates appropriate breathing mechanics with pelvic floor relaxation and contraction for improved muscle coordination in 8 weeks    Functional Goals Upon Discharge  - Pt will be independent with home exercise program, with proper demonstration, rationale and understanding in order to improve functional abilities and quality of life     - Patient is knowledgeable of postural and pelvic floor relaxation strategies to optimize toileting techniques - partially met  - Patient has implemented urge suppression strategies throughout the day and reports average voiding interval is 2-3 hours upon discharge in order to have more functional bladder functioning - partially met  - Patient reports decrease nocturia to 0-1 voids/night for more thorough sleep - partially met  - Patient is able to sit for 1 hour at [employer] without increase in left glut pain - not met    Plan  Patient would benefit from: women's health eval and skilled physical therapy  Planned modality interventions: biofeedback  Planned therapy interventions: manual therapy, neuromuscular re-education, patient education, self care, strengthening, stretching, home exercise program and behavior modification  Frequency: 1x week  Duration in weeks: 8  Plan of Care beginning date: 2023  Plan of Care expiration date: 2023  Treatment plan discussed with: patient        Subjective Evaluation    History of Present Illness  Mechanism of injury: Pt reports new onset of left vaginal/urethra pain with urinary urge irritation that began on 23  Pt saw OB who cleared her from any infection  Pt notes decrease in pain and urgency since starting manuals to release severe spasm noted in left bulbocavernous muscle  Urethra Pain:  At worst: 8/10 (pain and urge to urinate)  At best: 2/10    Lumbar Pain:  At worst: 3/10 (lifting and holding baby and ADLs)  At best: 1/10     Bladder Function:     Voiding Difficulties positive for: straining      Voiding Difficulties comments:     Voiding frequency: every 2-3 hours    Urinary leakage: no urine leakage    Urinary leakage not aggravated by: coughing and sneezing    Nocturia (episodes per night): 2 (when up to nurse baby)    Painful urination: No      Intake (ounces):  Water: 40, Coffee: 16,   Incontinence Management:     Pads/Diaper Use:  None    Bowel Function:     Voiding DIfficulties: urgency      Bowel frequency: daily    Stool softener use: no stool softeners  Sexual Function:     Pain during intercourse: Yes    Pain  Current pain ratin  At best pain ratin  At worst pain ratin (prolong sitting on hard surface)  Location: Left Glut          Objective     Strength/Myotome Testing     Left Hip   Planes of Motion   Flexion: 4 (Pelvic instabilty)  Extension: 4-  Abduction: 4+  Adduction: 3+  External rotation: 4+    Right Hip   Planes of Motion   Flexion: 4 (Pelvic instabilty)  Extension: 4+  Abduction: 4  Adduction: 2+  External rotation: 4+  Pelvic Floor Exam   Position: supine exam    Visual Inspection of Perineum:   Excursion of perineal body in cephalad direction with contraction of pelvic floor muscles (PFM): fair   Excursion of "perineal body in caudal direction with relaxation of pelvic floor muscles (PFM): weak    Pelvic Floor Muscle Exam     Palpation   Minimal increased muscle tension in the super transverse perineal  Moderate increased muscle tension in the bulbospongiosus, ischiocavernosus, puborectalis, pubococcygeus and iliococcygeus  No tenderness on right in the super transverse perineal  Minimal tenderness on right in the bulbospongiosus, ischiocavernosus, puborectalis, pubococcygeus, iliococcygeus and periurethral  Minimal tenderness on left in the super transverse perineal  Moderate tenderness on left in the bulbospongiosus, ischiocavernosus, puborectalis, pubococcygeus, iliococcygeus and periurethral    50% pelvic floor relaxation  5 seconds required for complete relaxation  PERFECT Score   Right power: anterior 2/5 2 sec hold 2 reps, posterior 3/5 10 sec hold 2 reps  Left power: anterior 1/5 5 sec hold, posteroir 2/5 3 sec hold 3 reps            Precautions: history of pelvic pain, difficulty emptying bladder post   Focus: PFM relaxation, core strengthening, urge suppression/bladder retraining      Manuals 3/14 3/30 5 5 516       External PFM TPR in s/l nv [facility]  nv       TPR to hip adductor    [facility]        STM to left bulbocavernosis + transverse perineal    [facility]       TPR to glut/lumbar  S/l             Lumbar UPA mobs  Prone   Prone [facility]       Sacral MFR  Rotation   Prone [facility]       Neuro Re-Ed             Quad pelvic tilt 10\"x3  Tail bone pain*          Supine TA (without PFM) nv 1\"x5           Supine BKFO   10x ea          TA Marches   10x ea          S/l Clamshells  7x ea fatigue 10x ea fatigue          S/l neutral clamshells  7x ea  fatigue 10x ea fatigue                                    Ther Ex             Re-assessment      performed       Seated Happy Baby stretch 30\"x1 30\"x3           Supine Happy Baby stretch             Prone Press-up  10x           Piriformis stretch   " "30\"x2          Standing Hip Adductor Stretch     10\"x5 ea                                               Ther Activity             Urge suppression  nv           Bladder retraining  nv           Gait Training                                       Modalities                                                       "

## 2023-05-23 ENCOUNTER — OFFICE VISIT (OUTPATIENT)
Dept: PHYSICAL THERAPY | Facility: CLINIC | Age: 38
End: 2023-05-23

## 2023-05-23 DIAGNOSIS — G89.29 CHRONIC LEFT HIP PAIN: ICD-10-CM

## 2023-05-23 DIAGNOSIS — M25.552 CHRONIC LEFT HIP PAIN: ICD-10-CM

## 2023-05-23 DIAGNOSIS — M62.89 PELVIC FLOOR DYSFUNCTION IN FEMALE: Primary | ICD-10-CM

## 2023-05-23 NOTE — PROGRESS NOTES
"Daily Note     Today's date: 2023  Patient name: Bird Riojas  : 1985  MRN: [id number]  Referring provider: Tian Philippe DO  Dx:   Encounter Diagnosis     ICD-10-CM    1  Pelvic floor dysfunction in female  M62 89       2  Chronic left hip pain  M25 552     G89 29                      Subjective: Pt continues to experience left pelvic pain, but notes decrease in urethral irritation and urge sensation following last tx session  Pt notes mild relief in left groin spasm with intercourse  Objective: See treatment diary below      Assessment: Demonstrated less PFM and left glut tension externally and mild decrease in left bulbocavernous muscle spasm compared to last visit  Pt tolerated manuals and mild decrease in pelvic pain, but notes greater relief the day following therapy  Patient would benefit from continued PT      Plan: Continue per plan of care        Precautions: history of pelvic pain, difficulty emptying bladder post   Focus: PFM relaxation, core strengthening, urge suppression/bladder retraining      Manuals 3/14 3/30 5      External PFM TPR in s/l nv [facility]  nv MONET      TPR to hip adductor    [facility]      STM to left bulbocavernosis + transverse perineal    [facility]      TPR to glut/lumbar  S/l             Lumbar UPA mobs  Prone   Prone [facility]       Sacral MFR  Rotation  MONET Prone [facility]       Neuro Re-Ed             Quad pelvic tilt 10\"x3  Tail bone pain*          Supine TA (without PFM) nv 1\"x5           Supine BKFO   10x ea          TA Marches   10x ea          S/l Clamshells  7x ea fatigue 10x ea fatigue          S/l neutral clamshells  7x ea  fatigue 10x ea fatigue                                    Ther Ex             Re-assessment      performed       Seated Happy Baby stretch 30\"x1 30\"x3           Supine Happy Baby stretch             Prone Press-up  10x           Piriformis stretch   30\"x2          Standing Hip Adductor Stretch     " "10\"x5 ea                                               Ther Activity             Urge suppression  nv           Bladder retraining  nv           Gait Training                                       Modalities                                            "

## 2023-05-30 ENCOUNTER — OFFICE VISIT (OUTPATIENT)
Dept: PHYSICAL THERAPY | Facility: CLINIC | Age: 38
End: 2023-05-30

## 2023-05-30 DIAGNOSIS — M25.552 CHRONIC LEFT HIP PAIN: ICD-10-CM

## 2023-05-30 DIAGNOSIS — G89.29 CHRONIC LEFT HIP PAIN: ICD-10-CM

## 2023-05-30 DIAGNOSIS — M62.89 PELVIC FLOOR DYSFUNCTION IN FEMALE: Primary | ICD-10-CM

## 2023-05-30 NOTE — PROGRESS NOTES
"Daily Note     Today's date: 2023  Patient name: Alice Childers  : 1985  MRN: [id number]  Referring provider: Macho Gould DO  Dx:   Encounter Diagnosis     ICD-10-CM    1  Pelvic floor dysfunction in female  M62 89       2  Chronic left hip pain  M25 552     G89 29                      Subjective: Pt reports improvement in urethra irritation and urgency since last visit, but still notes left glut/pudental nerve irritation  Objective: See treatment diary below      Assessment: Demonstrated overall decrease in left posterior PFM tension, mild-moderate PFM tension still noted in left anterior and left hip adductor  Demonstrated quick TA fatigue during s/l clamshells and with TA in supine, quadruped, and sitting  Encouraged pt to prioritize core strengthen if desires to address left glut/hip pain  Patient would benefit from continued PT      Plan: Continue per plan of care        Precautions: history of pelvic pain, difficulty emptying bladder post   Focus: PFM relaxation, core strengthening, urge suppression/bladder retraining      Manuals 3/14 3/30 5     External PFM TPR in s/l nv MONET MONET MONET  nv MONET MONET     TPR to hip adductor    [address]  [address] MONET     STM to left bulbocavernosis + transverse perineal    [address] MONET  externally     TPR to glut/lumbar  S/l  MONET           Lumbar UPA mobs  Prone  MONET Prone    MONET       Sacral MFR  Rotation  MONET Prone [facility]       Neuro Re-Ed             Quad pelvic tilt 10\"x3  Tail bone pain*     5\"x5     Supine TA (without PFM) nv 1\"x5      3\"x5     Supine BKFO   10x ea          TA Marches   10x ea          S/l Clamshells  7x ea fatigue 10x ea fatigue     TA  10x ea     S/l neutral clamshells  7x ea  fatigue 10x ea fatigue          Seated TA        1\"x5  HEP                  Ther Ex             Re-assessment      performed       Seated Happy Baby stretch 30\"x1 30\"x3           Supine Happy Baby stretch             Prone Press-up  10x      " "     Piriformis stretch   30\"x2          Standing Hip Adductor Stretch     10\"x5 ea        SKC        30\"x1     Piriformis stretch        30\"x1 R                  Ther Activity             Urge suppression  nv           Bladder retraining  nv           Gait Training                                       Modalities                                            "

## 2023-06-08 ENCOUNTER — OFFICE VISIT (OUTPATIENT)
Dept: PHYSICAL THERAPY | Facility: CLINIC | Age: 38
End: 2023-06-08
Payer: COMMERCIAL

## 2023-06-08 DIAGNOSIS — G89.29 CHRONIC LEFT HIP PAIN: ICD-10-CM

## 2023-06-08 DIAGNOSIS — M62.89 PELVIC FLOOR DYSFUNCTION IN FEMALE: Primary | ICD-10-CM

## 2023-06-08 DIAGNOSIS — M25.552 CHRONIC LEFT HIP PAIN: ICD-10-CM

## 2023-06-08 PROCEDURE — 97140 MANUAL THERAPY 1/> REGIONS: CPT | Performed by: PHYSICAL THERAPIST

## 2023-06-08 NOTE — PROGRESS NOTES
"Daily Note     Today's date: 2023  Patient name: Adam Coates  : 1985  MRN: [id number]  Referring provider: Bethany Webster DO  Dx:   Encounter Diagnosis     ICD-10-CM    1  Pelvic floor dysfunction in female  M62 89       2  Chronic left hip pain  M25 552     G89 29                      Subjective: Pt reports flare-up of left pudendal nerve irritation and return of burning around urethra recently  Objective: See treatment diary below      Assessment: Demonstrates severe muscle spasm in left glut and PFM and left bulbocavernosus which decreased with manuals  Encouraged pt to prioritize core/PFM strengthening to address PFM spasms  Patient would benefit from continued PT      Plan: Continue per plan of care        Precautions: history of pelvic pain, difficulty emptying bladder post   Focus: PFM relaxation, core strengthening, urge suppression/bladder retraining      Manuals 3/14 3/30 5    External PFM TPR in s/l nv MONET MONET MONET  nv MONET MONET MONET    TPR to hip adductor    [address]  [address]    STM to left bulbocavernosis + transverse perineal    [address] MONET  externally MONET internaly    TPR to glut/lumbar  S/l  MONET           Lumbar UPA mobs  Prone   Prone    MONET       Sacral MFR  Rotation  MONET Prone [facility]       Neuro Re-Ed             Quad pelvic tilt 10\"x3  Tail bone pain*     5\"x5     Supine TA (without PFM) nv 1\"x5      3\"x5     Supine BKFO   10x ea          TA Marches   10x ea          S/l Clamshells  7x ea fatigue 10x ea fatigue     TA  10x ea     S/l neutral clamshells  7x ea  fatigue 10x ea fatigue          Seated TA        1\"x5  HEP                  Ther Ex             Re-assessment      performed       Seated Happy Baby stretch 30\"x1 30\"x3           Supine Happy Baby stretch             Prone Press-up  10x           Piriformis stretch   30\"x2          Standing Hip Adductor Stretch     10\"x5 ea        SKC        30\"x1     Piriformis stretch        30\"x1 R      " Ther Activity             Urge suppression  nv           Bladder retraining  nv           Gait Training                                       Modalities

## 2023-07-11 ENCOUNTER — EVALUATION (OUTPATIENT)
Dept: PHYSICAL THERAPY | Facility: CLINIC | Age: 38
End: 2023-07-11
Payer: COMMERCIAL

## 2023-07-11 DIAGNOSIS — G89.29 CHRONIC LEFT HIP PAIN: ICD-10-CM

## 2023-07-11 DIAGNOSIS — M25.552 CHRONIC LEFT HIP PAIN: ICD-10-CM

## 2023-07-11 DIAGNOSIS — M62.89 PELVIC FLOOR DYSFUNCTION IN FEMALE: Primary | ICD-10-CM

## 2023-07-11 PROCEDURE — 97110 THERAPEUTIC EXERCISES: CPT | Performed by: PHYSICAL THERAPIST

## 2023-07-11 NOTE — PROGRESS NOTES
PT Re-Evaluation     Today's date: 2023  Patient name: Praeven Harman  : 1985  MRN: [id number]  Referring provider: Tamanna Vernon DO  Dx:   Encounter Diagnosis     ICD-10-CM    1. Pelvic floor dysfunction in female  M62.89       2. Chronic left hip pain  M25.552     G89.29                      Assessment  Assessment details: Pt returning to pelvic PT after being absent since 23. Pt admits not performing any strengthening exercises and focusing on stretches along to address pain. Pt reports overall decrease in left glut pain, but increase in lower back pain with ADLs and caring for her baby. Pt reports times of urethral irritation, but overall less since starting therapy. Demonstrates overall increase in core/LE strength and mild increase in PFM strength/endurance. Patient would benefit from further skilled PT to address core and PFM weakness affecting functional capacity.    Impairments: abnormal muscle tone, abnormal or restricted ROM, impaired physical strength, lacks appropriate home exercise program, pain with function and poor posture     Goals  Impairment Goals upon discharge  - Decrease left glut pain to 0-2/10 with sitting for 1 hour - not met  - Decrease back pain to 0-2/10 with ADLs - not met  - Improve pelvic stability with resisted SLR to 4+/5 - partially met  - Improve MMT for pelvic floor muscle (PFM) to greater than or equal to 4/5 in order to improve lumbopelvic stability and bladder control - not met  - Increase PFM endurance to 10 second hold for 10 reps - partially met  - Improve relaxation of PFM to 100% in 2 seconds - partially met  - Demonstrates appropriate breathing mechanics with pelvic floor relaxation and contraction for improved muscle coordination in 8 weeks - partially met    Functional Goals Upon Discharge  - Pt will be independent with home exercise program, with proper demonstration, rationale and understanding in order to improve functional abilities and quality of life. - Patient is knowledgeable of postural and pelvic floor relaxation strategies to optimize toileting techniques - partially met  - Patient has implemented urge suppression strategies throughout the day and reports average voiding interval is 2-3 hours upon discharge in order to have more functional bladder functioning - not met  - Patient reports decrease nocturia to 0-1 voids/night for more thorough sleep - partially met  - Patient is able to sit for 1 hour at [employer] without increase in left glut pain - not met    Plan  Patient would benefit from: skilled physical therapy  Planned modality interventions: biofeedback  Planned therapy interventions: manual therapy, neuromuscular re-education, patient education, self care, strengthening, stretching, home exercise program and behavior modification  Frequency: 1x week  Duration in weeks: 8  Plan of Care expiration date: 9/5/2023  Treatment plan discussed with: patient        Subjective Evaluation    History of Present Illness  Mechanism of injury: Pt reports overall decrease in left glut pain, but increase in lower back pain with carrying/holding baby. Pt reports times of urethra pain, but it comes and goes. Urethra Pain:  At worst: 9/10 (pain and urge to urinate)  At best: 2/10    Lumbar Pain:  At worst: 8/10 (lifting and holding baby and ADLs)  At best: 4/10     Bladder Function:     Voiding Difficulties positive for: straining      Voiding Difficulties comments:     Voiding frequency: every 1-2 hours    Urinary leakage: no urine leakage    Urinary leakage not aggravated by: coughing and sneezing    Nocturia (episodes per night): 1-2 (drinking too much water at night)    Painful urination: No      Intake (ounces):  Water: 40, Coffee: 16,   Incontinence Management:     Pads/Diaper Use:  None    Bowel Function:     Voiding DIfficulties: urgency      Bowel frequency: daily    Stool softener use: no stool softeners    Sexual Function:     Pain during intercourse: Yes, with insertion   Pain  Current pain ratin  At best pain ratin  At worst pain rating: 3  Location: Left Glut  Quality: dull ache          Objective     Active Range of Motion     Lumbar   Flexion: 40 degrees   Extension: 5 degrees   Left lateral flexion: 0 degrees       Right lateral flexion: 0 degrees     Additional Active Range of Motion Details  Standing lumbar lordosis: 20* of extension    Strength/Myotome Testing     Left Hip   Planes of Motion   Flexion: 4+ (moderate pelvic instability)  Extension: 4-  Abduction: 4+  Adduction: 3+  External rotation: 4+  Internal rotation: 4    Right Hip   Planes of Motion   Flexion: 4+ (pelvic stability)  Extension: 4  Abduction: 4+  Adduction: 3+  External rotation: 4+  Internal rotation: 4      Abdominal Assessment:      Position: supine exam       Pelvic Floor Muscle Exam:    Muscle Contraction: well isolated   Pelvic floor muscle relaxation is incomplete. 75% pelvic floor relaxation   3 seconds required for complete relaxation. PERFECT Score   Right power: anterior 3/5 2 sec hold 2 reps, posterior 3/5 10 sec hold 2 reps. Left power: anterior 2/5 5 sec hold, posterior 2/5 5 sec hold 3 reps.       pelvic floor exam consent given by patient    Pelvic exam completed: vaginally              Precautions: history of pelvic pain, difficulty emptying bladder post   Focus: PFM relaxation, core strengthening, urge suppression/bladder retraining      Manuals             External PFM TPR in s/l nv            TPR to hip adductor             STM to left bulbocavernosis + transverse perineal MONET            TPR to glut/lumbar             Lumbar UPA mobs nv            Sacral MFR             Neuro Re-Ed             Quad pelvic tilt nv            Supine TA (without PFM)             TA Amanda             S/l Clamshells             S/l neutral clamshells             Seated TA nv            Seated Kegel nv            Ther Ex Re-assessment performed            Seated Happy Baby stretch             Supine Happy Baby stretch             Prone Press-up             Piriformis stretch             Standing Hip Adductor Stretch             SKC             Piriformis stretch                          Ther Activity                                       Gait Training                                       Modalities

## 2023-07-11 NOTE — LETTER
2023    Carleen Gosselin, [address] [facility] [place]    Patient: Amparo Cook   YOB: 1985   Date of Visit: 2023     Encounter Diagnosis     ICD-10-CM    1. Pelvic floor dysfunction in female  M62.89       2. Chronic left hip pain  M25.552     G89.29           Dear Dr. Edgar Marrero:    Thank you for your recent referral of Amparo Cook. Please review the attached evaluation summary from Marilu's recent visit. Please verify that you agree with the plan of care by signing the attached order. If you have any questions or concerns, please do not hesitate to call. I sincerely appreciate the opportunity to share in the care of one of your patients and hope to have another opportunity to work with you in the near future. Sincerely,    Bryce Avina, PT      Referring Provider:      I certify that I have read the below Plan of Care and certify the need for these services furnished under this plan of treatment while under my care. Carleen Gosselin, DO  [address]  Via Fax: [phone]          PT Re-Evaluation     Today's date: 2023  Patient name: Amparo Cook  : 1985  MRN: [id number]  Referring provider: Carleen Gosselin, DO  Dx:   Encounter Diagnosis     ICD-10-CM    1. Pelvic floor dysfunction in female  M62.89       2. Chronic left hip pain  M25.552     G89.29                      Assessment  Assessment details: Pt returning to pelvic PT after being absent since 23. Pt admits not performing any strengthening exercises and focusing on stretches along to address pain. Pt reports overall decrease in left glut pain, but increase in lower back pain with ADLs and caring for her baby. Pt reports times of urethral irritation, but overall less since starting therapy. Demonstrates overall increase in core/LE strength and mild increase in PFM strength/endurance.  Patient would benefit from further skilled PT to address core and PFM weakness affecting functional capacity. Impairments: abnormal muscle tone, abnormal or restricted ROM, impaired physical strength, lacks appropriate home exercise program, pain with function and poor posture     Goals  Impairment Goals upon discharge  - Decrease left glut pain to 0-2/10 with sitting for 1 hour - not met  - Decrease back pain to 0-2/10 with ADLs - not met  - Improve pelvic stability with resisted SLR to 4+/5 - partially met  - Improve MMT for pelvic floor muscle (PFM) to greater than or equal to 4/5 in order to improve lumbopelvic stability and bladder control - not met  - Increase PFM endurance to 10 second hold for 10 reps - partially met  - Improve relaxation of PFM to 100% in 2 seconds - partially met  - Demonstrates appropriate breathing mechanics with pelvic floor relaxation and contraction for improved muscle coordination in 8 weeks - partially met    Functional Goals Upon Discharge  - Pt will be independent with home exercise program, with proper demonstration, rationale and understanding in order to improve functional abilities and quality of life.    - Patient is knowledgeable of postural and pelvic floor relaxation strategies to optimize toileting techniques - partially met  - Patient has implemented urge suppression strategies throughout the day and reports average voiding interval is 2-3 hours upon discharge in order to have more functional bladder functioning - not met  - Patient reports decrease nocturia to 0-1 voids/night for more thorough sleep - partially met  - Patient is able to sit for 1 hour at [employer] without increase in left glut pain - not met    Plan  Patient would benefit from: skilled physical therapy  Planned modality interventions: biofeedback  Planned therapy interventions: manual therapy, neuromuscular re-education, patient education, self care, strengthening, stretching, home exercise program and behavior modification  Frequency: 1x week  Duration in weeks: 8  Plan of Care expiration date: 2023  Treatment plan discussed with: patient        Subjective Evaluation    History of Present Illness  Mechanism of injury: Pt reports overall decrease in left glut pain, but increase in lower back pain with carrying/holding baby. Pt reports times of urethra pain, but it comes and goes. Urethra Pain:  At worst: 9/10 (pain and urge to urinate)  At best: 2/10    Lumbar Pain:  At worst: 8/10 (lifting and holding baby and ADLs)  At best: 4/10     Bladder Function:     Voiding Difficulties positive for: straining      Voiding Difficulties comments:     Voiding frequency: every 1-2 hours    Urinary leakage: no urine leakage    Urinary leakage not aggravated by: coughing and sneezing    Nocturia (episodes per night): 1-2 (drinking too much water at night)    Painful urination: No      Intake (ounces):  Water: 40, Coffee: 16,   Incontinence Management:     Pads/Diaper Use:  None    Bowel Function:     Voiding DIfficulties: urgency      Bowel frequency: daily    Stool softener use: no stool softeners    Sexual Function:     Pain during intercourse: Yes, with insertion   Pain  Current pain ratin  At best pain ratin  At worst pain rating: 3  Location: Left Glut  Quality: dull ache          Objective     Active Range of Motion     Lumbar   Flexion: 40 degrees   Extension: 5 degrees   Left lateral flexion: 0 degrees       Right lateral flexion: 0 degrees     Additional Active Range of Motion Details  Standing lumbar lordosis: 20* of extension    Strength/Myotome Testing     Left Hip   Planes of Motion   Flexion: 4+ (moderate pelvic instability)  Extension: 4-  Abduction: 4+  Adduction: 3+  External rotation: 4+  Internal rotation: 4    Right Hip   Planes of Motion   Flexion: 4+ (pelvic stability)  Extension: 4  Abduction: 4+  Adduction: 3+  External rotation: 4+  Internal rotation: 4      Abdominal Assessment:      Position: supine exam       Pelvic Floor Muscle Exam:    Muscle Contraction: well isolated   Pelvic floor muscle relaxation is incomplete. 75% pelvic floor relaxation   3 seconds required for complete relaxation. PERFECT Score   Right power: anterior 3/5 2 sec hold 2 reps, posterior 3/5 10 sec hold 2 reps. Left power: anterior 2/5 5 sec hold, posterior 2/5 5 sec hold 3 reps.       pelvic floor exam consent given by patient    Pelvic exam completed: vaginally             Precautions: history of pelvic pain, difficulty emptying bladder post   Focus: PFM relaxation, core strengthening, urge suppression/bladder retraining      Manuals             External PFM TPR in s/l nv            TPR to hip adductor             STM to left bulbocavernosis + transverse perineal MONET            TPR to glut/lumbar             Lumbar UPA mobs nv            Sacral MFR             Neuro Re-Ed             Quad pelvic tilt nv            Supine TA (without PFM)             TA Marches             S/l Clamshells             S/l neutral clamshells             Seated TA nv            Seated Kegel nv            Ther Ex             Re-assessment performed            Seated Happy Baby stretch             Supine Happy Baby stretch             Prone Press-up             Piriformis stretch             Standing Hip Adductor Stretch             SKC             Piriformis stretch                          Ther Activity                                       Gait Training                                       Modalities

## 2023-07-18 ENCOUNTER — OFFICE VISIT (OUTPATIENT)
Dept: PHYSICAL THERAPY | Facility: CLINIC | Age: 38
End: 2023-07-18
Payer: COMMERCIAL

## 2023-07-18 DIAGNOSIS — M62.89 PELVIC FLOOR DYSFUNCTION IN FEMALE: Primary | ICD-10-CM

## 2023-07-18 DIAGNOSIS — M25.552 CHRONIC LEFT HIP PAIN: ICD-10-CM

## 2023-07-18 DIAGNOSIS — G89.29 CHRONIC LEFT HIP PAIN: ICD-10-CM

## 2023-07-18 PROCEDURE — 97140 MANUAL THERAPY 1/> REGIONS: CPT | Performed by: PHYSICAL THERAPIST

## 2023-07-18 NOTE — PROGRESS NOTES
Daily Note     Today's date: 2023  Patient name: Ella Monterroso  : 1985  MRN: [id number]  Referring provider: Ryan Brewer DO  Dx:   Encounter Diagnosis     ICD-10-CM    1. Pelvic floor dysfunction in female  M62.89       2. Chronic left hip pain  M25.552     G89.29                      Subjective: Pt reports relief in urethra irritant and decrease in pelvic pain follow last tx session, but notes flare-up of pelvic pain and urethra irritant with urinary frequency yesterday and today. Objective: See treatment diary below      Assessment: Reviewed quadruped TA with quick fatigue noted and increase in anterior PFM tension reported. Demonstrated moderate tension in posterior PFM and severe tension in b/l anterior PFM with muscle spasm/twitching noted in left bulbocavernosus. Decrease in PFM tension noted with muscles, but muscle spasm in left bulbo still noted. Patient would benefit from continued PT      Plan: Continue per plan of care.       Precautions: history of pelvic pain, difficulty emptying bladder post   Focus: PFM relaxation, core strengthening, urge suppression/bladder retraining      Manuals            External PFM TPR in s/l nv MONET           TPR to hip adductor             STM to left bulbocavernosis + transverse perineal MONET MONET           TPR to glut/lumbar             Lumbar UPA mobs nv            Sacral MFR             Neuro Re-Ed             Quad TA pelvic tilt nv 10"x8  fatigued           Supine TA (without PFM)             TA Marches             S/l Clamshells             S/l neutral clamshells             Seated TA nv            Seated Kegel nv            Ther Ex             Re-assessment performed            Seated Happy Baby stretch             Supine Happy Baby stretch             Prone Press-up             Piriformis stretch             Standing Hip Adductor Stretch             SKC             Piriformis stretch                          Ther Activity Gait Training                                       Modalities

## 2023-07-25 ENCOUNTER — OFFICE VISIT (OUTPATIENT)
Dept: PHYSICAL THERAPY | Facility: CLINIC | Age: 38
End: 2023-07-25
Payer: COMMERCIAL

## 2023-07-25 DIAGNOSIS — M25.552 CHRONIC LEFT HIP PAIN: ICD-10-CM

## 2023-07-25 DIAGNOSIS — M62.89 PELVIC FLOOR DYSFUNCTION IN FEMALE: Primary | ICD-10-CM

## 2023-07-25 DIAGNOSIS — G89.29 CHRONIC LEFT HIP PAIN: ICD-10-CM

## 2023-07-25 PROCEDURE — 97140 MANUAL THERAPY 1/> REGIONS: CPT | Performed by: PHYSICAL THERAPIST

## 2023-07-25 NOTE — PROGRESS NOTES
Daily Note     Today's date: 2023  Patient name: Pauline Rodriguez  : 1985  MRN: [id number]  Referring provider: Jeimy Gibbs DO  Dx:   Encounter Diagnosis     ICD-10-CM    1. Pelvic floor dysfunction in female  M62.89       2. Chronic left hip pain  M25.552     G89.29                      Subjective: Pt continues to experience lower back pain and urethral irritation. Objective: See treatment diary below      Assessment: Continues to demonstrate tension in left bulbo and transfers perineal with difficulty relaxing muscles with STM. Pt noted increase in PFM tension with s/l TA+kegel. Discussed benefit of dilator to address PFM spasm. Pt notes she would consider them. Patient would benefit from continued PT      Plan: Continue per plan of care.       Precautions: history of pelvic pain, difficulty emptying bladder post   Focus: PFM relaxation, core strengthening, urge suppression/bladder retraining      Manuals           External PFM TPR in s/l nv MONET MONET          TPR to hip adductor             STM to left bulbocavernosis + transverse perineal MONET MONET MONET          TPR to glut/lumbar             Lumbar UPA mobs nv            Sacral MFR             Neuro Re-Ed             Quad TA pelvic tilt nv 10"x8  fatigued           Supine TA (without PFM)   S/l TA+PFM 5"x8          TA Marches             S/l Clamshells             S/l neutral clamshells             Seated TA nv            Seated Kegel nv            Ther Ex             Re-assessment performed            Seated Happy Baby stretch             Supine Happy Baby stretch             Prone Press-up             Piriformis stretch             Standing Hip Adductor Stretch             SKC             Piriformis stretch                          Ther Activity                                       Gait Training                                       Modalities

## 2023-08-10 ENCOUNTER — OFFICE VISIT (OUTPATIENT)
Dept: PHYSICAL THERAPY | Facility: CLINIC | Age: 38
End: 2023-08-10
Payer: COMMERCIAL

## 2023-08-10 DIAGNOSIS — M62.89 PELVIC FLOOR DYSFUNCTION IN FEMALE: Primary | ICD-10-CM

## 2023-08-10 DIAGNOSIS — G89.29 CHRONIC LEFT HIP PAIN: ICD-10-CM

## 2023-08-10 DIAGNOSIS — M25.552 CHRONIC LEFT HIP PAIN: ICD-10-CM

## 2023-08-10 PROCEDURE — 97140 MANUAL THERAPY 1/> REGIONS: CPT | Performed by: PHYSICAL THERAPIST

## 2023-08-10 NOTE — PROGRESS NOTES
Daily Note     Today's date: 8/10/2023  Patient name: Meryl Bowman  : 1985  MRN: [id number]  Referring provider: Tamir Smith DO  Dx:   Encounter Diagnosis     ICD-10-CM    1. Pelvic floor dysfunction in female  M62.89       2. Chronic left hip pain  M25.552     G89.29                      Subjective: Pt c/o of lower back, left glut/hip, and urethral irritation pre tx. Pt admits to not keeping up with HEP, but reports she needs to start exercising to make progress in addressing her pain. Objective: See treatment diary below      Assessment: Demonstrated hypomobility in lumbar spine and sacrum, with mild increase in mobility noted with UPA and PA mobs. With STM to severe spasm in bulbo muscle demonstrated improvement in muscle relaxation without muscle spasm when performing following sacral mobs. Encouraged pt to focus on quadruped TA daily, if consistent with 1 exercise daily, will discuss further exercises to add to HEP. Patient would benefit from continued PT      Plan: Continue per plan of care.       Precautions: history of pelvic pain, difficulty emptying bladder post   Focus: PFM relaxation, core strengthening, urge suppression/bladder retraining      Manuals 7/11 7/18 7/25 8/10         External PFM TPR in s/l nv MONET MONET          TPR to hip adductor             STM to left bulbocavernosis + transverse perineal MONET MONET MONET MONET b/l         TPR to glut/lumbar             Lumbar UPA mobs nv   Prone MONET         Sacral MFR    Prone MONET  +sacral PA mobs                      Neuro Re-Ed             Quad TA pelvic tilt nv 10"x8  fatigued  10"x3         Supine TA (without PFM)   S/l TA+PFM 5"x8          TA Marches             S/l Clamshells             S/l neutral clamshells             Seated TA nv            Seated Kegel nv            Ther Ex             Re-assessment performed            Seated Happy Baby stretch    30"x2         Supine Happy Baby stretch             Prone Press-up Piriformis stretch             Standing Hip Adductor Stretch             SKC             Piriformis stretch             Child's Pose stretch    With over-pressure  manual         Ther Activity                                       Gait Training                                       Modalities

## 2023-08-17 ENCOUNTER — OFFICE VISIT (OUTPATIENT)
Dept: PHYSICAL THERAPY | Facility: CLINIC | Age: 38
End: 2023-08-17
Payer: COMMERCIAL

## 2023-08-17 DIAGNOSIS — G89.29 CHRONIC LEFT HIP PAIN: ICD-10-CM

## 2023-08-17 DIAGNOSIS — M25.552 CHRONIC LEFT HIP PAIN: ICD-10-CM

## 2023-08-17 DIAGNOSIS — M62.89 PELVIC FLOOR DYSFUNCTION IN FEMALE: Primary | ICD-10-CM

## 2023-08-17 PROCEDURE — 97140 MANUAL THERAPY 1/> REGIONS: CPT | Performed by: PHYSICAL THERAPIST

## 2023-08-17 NOTE — PROGRESS NOTES
Daily Note     Today's date: 2023  Patient name: Walter Fernandes  : 1985  MRN: [id number]  Referring provider: Yobany Adame DO  Dx:   Encounter Diagnosis     ICD-10-CM    1. Pelvic floor dysfunction in female  M62.89       2. Chronic left hip pain  M25.552     G89.29                      Subjective: Pt reports relief in pain for a day following last tx session. Pt c/o flare-up of left glut pain and urethral pain pre tx. Pt admits to performing quad TA only once since last visit. Objective: See treatment diary below      Assessment: Pt tolerated manuals with decrease in fascial tension noted in lumbar and spasm in left glut, but pt still noted deep left glut pain. Continues to demonstrates spasm in left bulbo m with mild decrease with STM. Patient would benefit from continued PT      Plan: Continue per plan of care.       Precautions: history of pelvic pain, difficulty emptying bladder post   Focus: PFM relaxation, core strengthening, urge suppression/bladder retraining      Manuals 7/11 7/18 7/25 8/10 8/17        External PFM TPR in s/l nv MONET MONET  MONET        TPR to hip adductor             STM to left bulbocavernosis + transverse perineal MONET MONET MONET MONET b/l MONET b/l        TPR to glut/lumbar             Lumbar UPA mobs nv   Prone MONET Prone MONET        Sacral MFR    Prone MONET  +sacral PA mobs Prone  MONET + sacral PA mobs                     Neuro Re-Ed             Quad TA pelvic tilt nv 10"x8  fatigued  10"x3         Supine TA (without PFM)   S/l TA+PFM 5"x8          TA Marches             S/l Clamshells             S/l neutral clamshells             Seated TA nv            Seated Kegel nv            Ther Ex             Re-assessment performed            Seated Happy Baby stretch    30"x2         Supine Happy Baby stretch             Prone Press-up             Piriformis stretch     reviewed        Standing Hip Adductor Stretch             SKC             Piriformis stretch             Child's Pose stretch    With over-pressure  manual         Ther Activity                                       Gait Training                                       Modalities

## 2023-08-24 ENCOUNTER — OFFICE VISIT (OUTPATIENT)
Dept: PHYSICAL THERAPY | Facility: CLINIC | Age: 38
End: 2023-08-24
Payer: COMMERCIAL

## 2023-08-24 DIAGNOSIS — M62.89 PELVIC FLOOR DYSFUNCTION IN FEMALE: Primary | ICD-10-CM

## 2023-08-24 DIAGNOSIS — M25.552 CHRONIC LEFT HIP PAIN: ICD-10-CM

## 2023-08-24 DIAGNOSIS — G89.29 CHRONIC LEFT HIP PAIN: ICD-10-CM

## 2023-08-24 PROCEDURE — 97140 MANUAL THERAPY 1/> REGIONS: CPT | Performed by: PHYSICAL THERAPIST

## 2023-08-24 PROCEDURE — 97112 NEUROMUSCULAR REEDUCATION: CPT | Performed by: PHYSICAL THERAPIST

## 2023-08-24 NOTE — PROGRESS NOTES
Daily Note     Today's date: 2023  Patient name: Bud Gonzalez  : 1985  MRN: [id number]  Referring provider: Rosalia Almaguer DO  Dx:   Encounter Diagnosis     ICD-10-CM    1. Pelvic floor dysfunction in female  M62.89       2. Chronic left hip pain  M25.552     G89.29                      Subjective: Pt reports relief from left glut pain following last tx session, but still notes urethral irritation from PFM spasm and lower back discomfort/pain. Pt admits to only performing core exercise occasionally and not daily. Objective: See treatment diary below      Assessment: Attempted supine TA, pt noted increase in PFM tension after 5 reps. Demonstrated mild decrease in PFM tension with TPR externally. Increase in sacral mobility noted with MFR sacral and mobs. Review quadruped TA and seated TA next visit, along with manuals. Patient would benefit from continued PT      Plan: Continue per plan of care.       Precautions: history of pelvic pain, difficulty emptying bladder post   Focus: PFM relaxation, core strengthening, urge suppression/bladder retraining      Manuals 7/11 7/18 7/25 8/10 8/17 8/24       External PFM TPR in s/l nv MONET MONET  MONET MONET       TPR to hip adductor             STM to left bulbocavernosis + transverse perineal MONET MONET MONET MONET b/l MONET b/l externally in supine  [facility]       TPR to glut/lumbar             Lumbar UPA mobs nv   Prone MONET Prone MONET Prone MONET       Sacral MFR    Prone MONET  +sacral PA mobs Prone  MONET + sacral PA mobs Prone MONET + sacral PA mobs                    Neuro Re-Ed             Quad TA pelvic tilt nv 10"x8  fatigued  10"x3  nv       Supine TA (without PFM)   S/l TA+PFM 5"x8   Supine  TA 3"       TA Marches             S/l Clamshells      nv       S/l neutral clamshells      nv       Seated TA nv     nv       Seated Kegel nv            Ther Ex             Re-assessment performed            Seated Happy Baby stretch    30"x2         Supine Happy Baby stretch      30"x1 Supine Butterfly Stretch      30"x       Piriformis stretch     reviewed        Standing Hip Adductor Stretch             SKC             Piriformis stretch             Child's Pose stretch    With over-pressure  manual         Ther Activity                                       Gait Training                                       Modalities

## 2023-08-31 ENCOUNTER — OFFICE VISIT (OUTPATIENT)
Dept: PHYSICAL THERAPY | Facility: CLINIC | Age: 38
End: 2023-08-31
Payer: COMMERCIAL

## 2023-08-31 DIAGNOSIS — M25.552 CHRONIC LEFT HIP PAIN: ICD-10-CM

## 2023-08-31 DIAGNOSIS — G89.29 CHRONIC LEFT HIP PAIN: ICD-10-CM

## 2023-08-31 DIAGNOSIS — M62.89 PELVIC FLOOR DYSFUNCTION IN FEMALE: Primary | ICD-10-CM

## 2023-08-31 PROCEDURE — 97140 MANUAL THERAPY 1/> REGIONS: CPT | Performed by: PHYSICAL THERAPIST

## 2023-08-31 NOTE — PROGRESS NOTES
Daily Note     Today's date: 2023  Patient name: Sonya Brunner  : 1985  MRN: [id number]  Referring provider: Alban Salcedo DO  Dx:   Encounter Diagnosis     ICD-10-CM    1. Pelvic floor dysfunction in female  M62.89       2. Chronic left hip pain  M25.552     G89.29                      Subjective: Pt reports difficulty sleeping last night due to pelvic pain and urethral irritation. Objective: See treatment diary below      Assessment: Demonstrated increase in sacral and thoracic mobility following manual. Continues to demosntrates tension in left bulbo with minimal release with STM. Attempted contract-relax, instructing pt to contract PFM 25% briefly then relax and take a diaphragmatic breath. Pt unable to contract PFM lightly, but able to engage TA at 25% for a very slight PFM co-contraction and improvement in PFM relaxation following relax and diaphragmatic breathing with STM. Review contract-relax through TA next visit to establish a HEP. Patient would benefit from continued PT      Plan: Continue per plan of care.       Precautions: history of pelvic pain, difficulty emptying bladder post   Focus: PFM relaxation, core strengthening, urge suppression/bladder retraining      Manuals 7/11 7/18 7/25 8/10 8/17 8/24 8/31      External PFM TPR in s/l nv MONET MONET  MONET MONET       TPR to hip adductor             STM to left bulbocavernosis + transverse perineal MONET MONET MONET MONET b/l MONET b/l externally in supine   [facility] internally  [facility]      TPR to glut/lumbar             Lumbar UPA mobs nv   Prone MONET Prone MONET Prone MONET Prone MONET  + thoracic      Sacral MFR    Prone MONET  +sacral PA mobs Prone  MONET + sacral PA mobs Prone MONET + sacral PA mobs Prone  MONET + sacral m PA mobs      Contract-Relax        TA 25% for slight PFM co-contraction -> deep breathing      Neuro Re-Ed             Quad TA pelvic tilt nv 10"x8  fatigued  10"x3  nv       Supine TA (without PFM)   S/l TA+PFM 5"x8   Supine  TA 3"       TA [address] [facility] S/l Gavino      nv       S/l neutral gavino      nv       Seated TA nv     nv       Seated Kegel nv            Ther Ex             Re-assessment performed            Seated Happy Baby stretch    30"x2         Supine Happy Baby stretch      30"x1       Supine Butterfly Stretch      30"x       Piriformis stretch     reviewed        Standing Hip Adductor Stretch             SKC             Piriformis stretch             Child's Pose stretch    With over-pressure  manual   With over-pressure      Ther Activity                                       Gait Training                                       Modalities

## 2023-09-07 ENCOUNTER — EVALUATION (OUTPATIENT)
Dept: PHYSICAL THERAPY | Facility: CLINIC | Age: 38
End: 2023-09-07
Payer: COMMERCIAL

## 2023-09-07 DIAGNOSIS — G89.29 CHRONIC LEFT HIP PAIN: ICD-10-CM

## 2023-09-07 DIAGNOSIS — M25.552 CHRONIC LEFT HIP PAIN: ICD-10-CM

## 2023-09-07 DIAGNOSIS — M62.89 PELVIC FLOOR DYSFUNCTION IN FEMALE: Primary | ICD-10-CM

## 2023-09-07 PROCEDURE — 97140 MANUAL THERAPY 1/> REGIONS: CPT | Performed by: PHYSICAL THERAPIST

## 2023-09-07 PROCEDURE — 97110 THERAPEUTIC EXERCISES: CPT | Performed by: PHYSICAL THERAPIST

## 2023-09-07 NOTE — PROGRESS NOTES
PT Re-Evaluation     Today's date: 2023  Patient name: Elisha Quevedo  : 1985  MRN: [id number]  Referring provider: Emeterio Brower DO  Dx:   Encounter Diagnosis     ICD-10-CM    1. Pelvic floor dysfunction in female  M62.89       2. Chronic left hip pain  M25.552     G89.29                      Assessment  Assessment details: Pt reports temporary relief in pelvic pain with manuals in therapy, but since pt reports she is unable to performing core strengthening through HEP, pt notes return in pelvic pain after a few days. Demonstrates no decline in pelvic stability or LE strength, but demonstrates overactivity of left anterior PFM (bulbo) and decrease in strength in posterior PFM. Improved posterior PFM contraction noted when instructed pt activate TA alone. Patient would benefit from further skilled PT to decrease pain and increase core/PFM strength to maximize functional capacity.   Impairments: abnormal muscle tone, abnormal or restricted ROM, impaired physical strength, lacks appropriate home exercise program, pain with function and poor posture     Goals  Impairment Goals upon discharge  - Decrease left glut pain to 0-2/10 with sitting for 1 hour - not met  - Decrease back pain to 0-2/10 with ADLs - not met  - Improve pelvic stability with resisted SLR to 4+/5 - partially met  - Improve MMT for pelvic floor muscle (PFM) to greater than or equal to 4/5 in order to improve lumbopelvic stability and bladder control - not met  - Increase PFM endurance to 10 second hold for 10 reps - partially met  - Improve relaxation of PFM to 100% in 2 seconds - partially met  - Demonstrates appropriate breathing mechanics with pelvic floor relaxation and contraction for improved muscle coordination in 8 weeks - partially met    Functional Goals Upon Discharge  - Pt will be independent with home exercise program, with proper demonstration, rationale and understanding in order to improve functional abilities and quality of life. - Patient is knowledgeable of postural and pelvic floor relaxation strategies to optimize toileting techniques - partially met  - Patient has implemented urge suppression strategies throughout the day and reports average voiding interval is 2-3 hours upon discharge in order to have more functional bladder functioning - partially met  - Patient reports decrease nocturia to 0-1 voids/night for more thorough sleep - partially met  - Patient is able to sit for 1 hour at [employer] without increase in left glut pain - not met    Plan  Patient would benefit from: women's health eval and skilled physical therapy  Planned modality interventions: biofeedback  Planned therapy interventions: manual therapy, neuromuscular re-education, patient education, self care, strengthening, stretching, home exercise program, behavior modification and therapeutic activities  Frequency: 1x week  Duration in weeks: 8  Plan of Care expiration date: 11/2/2023  Treatment plan discussed with: patient        Subjective Evaluation    History of Present Illness  Mechanism of injury: Pt reports relief in symptoms with PT sessions, but notes muscle spasm and pain returning after a few days. 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 Urethra Pain:  At worst: 9/10 (pain and urge to urinate)  At best: 3-4/10    Lumbar Pain:  At worst: 8/10 (lifting and holding baby and ADLs)  At best: 2-3/10     Bladder Function:     Voiding Difficulties positive for: straining      Voiding Difficulties comments:     Voiding frequency: every 1-2 hours    Urinary leakage: no urine leakage    Urinary leakage not aggravated by: coughing and sneezing    Nocturia (episodes per night): 0-2 (drinking too much water at night)    Painful urination: No      Intake (ounces):  Water: 40, Coffee: 16,   Incontinence Management:     Pads/Diaper Use:  None    Bowel Function:     Voiding DIfficulties: urgency Bowel frequency: daily    Stool softener use: no stool softeners    Sexual Function:     Pain during intercourse: Yes, with insertion   Pain  At best pain ratin  At worst pain ratin  Location: Left Glut  Quality: dull ache          Objective     Strength/Myotome Testing     Left Hip   Planes of Motion   Flexion: 4 (mild pelvic instability)  Abduction: 4+  Adduction: 3+  External rotation: 4+  Internal rotation: 4    Right Hip   Planes of Motion   Flexion: 4+ (mild pelvic instability)  Abduction: 4+  Adduction: 4-  External rotation: 4+  Internal rotation: 4+      Abdominal Assessment:      Position: supine exam    Visual Inspection of Perineum:   PFM Contraction Comments: Severe muscle spasm in left bulbospongiosus, ischiocavernosus, super transverse perineal    Mild tension in right bulbospongiosus and super transverse perineal       Pelvic Floor Muscle Exam:    75% pelvic floor relaxation   2 seconds required for complete relaxation. PERFECT Score   Right power: anterior and posterior 2/5 5 sec hold, 3 reps. Left power: Anterior 3/5 10 sec 2 sec, posterior 2/5 5 sec hold 2 sec.                       Precautions: history of pelvic pain, difficulty emptying bladder post   Focus: PFM relaxation, core strengthening, urge suppression/bladder retraining  Next visit focus on light TA contraction for PFM co-contraction,    Manuals 7/11 7/18 7/25 8/10 8/17 8/24 8/31 9/7     External PFM TPR in s/l nv [address]  MONET     TPR to hip adductor             STM to left bulbocavernosis + transverse perineal [address] [facility] MONET b/l MONET b/l externally in supine   [facility] internally  [facility] interla with TA contract-relax     TPR to glut/lumbar             Lumbar UPA mobs nv   Prone MONET Prone MONET Prone MONET Prone MONET  + thoracic Prone MONET     Sacral MFR    Prone MONET  +sacral PA mobs Prone  MONET + sacral PA mobs Prone MONET + sacral PA mobs Prone  MONET + sacral m PA mobs Prone MONET     Contract-Relax        TA 25% for slight PFM co-contraction -> deep breathing      Neuro Re-Ed             Quad TA pelvic tilt nv 10"x8  fatigued  10"x3  nv  nv     Supine TA (without PFM)        1"x10     S/l Clamshells      nv  nv     S/l reverse clam        nv     Seated TA nv     nv                    Ther Ex             Re-assessment performed       performed     Seated Happy Baby stretch    30"x2         Supine Happy Baby stretch      30"x1       Supine Butterfly Stretch      30"x       Piriformis stretch     reviewed        Standing Hip Adductor Stretch             SKC             Piriformis stretch             Child's Pose stretch    With over-pressure  manual   With over-pressure nv     Ther Activity                                       Gait Training                                       Modalities

## 2023-09-07 NOTE — LETTER
2023    Stu Shine [address] [facility]    Patient: Roberta Ballard   YOB: 1985   Date of Visit: 2023     Encounter Diagnosis     ICD-10-CM    1. Pelvic floor dysfunction in female  M62.89       2. Chronic left hip pain  M25.552     G89.29           Dear Dr. Deisi Boateng:    Please review the attached re-evaluation summary from Marilu's recent visit. Please verify that you agree with the plan of care by signing the attached order. If you have any questions or concerns, please do not hesitate to call. I sincerely appreciate the opportunity to share in the care of one of your patients and hope to have another opportunity to work with you in the near future. Sincerely,    Jolynn Dumont, PT      Referring Provider:      I certify that I have read the below Plan of Care and certify the need for these services furnished under this plan of treatment while under my care. Stu Shine DO  [address]  Via Fax: [phone]          PT Re-Evaluation     Today's date: 2023  Patient name: Roberta Ballard  : 1985  MRN: [id number]  Referring provider: Stu Shine DO  Dx:   Encounter Diagnosis     ICD-10-CM    1. Pelvic floor dysfunction in female  M62.89       2. Chronic left hip pain  M25.552     G89.29                      Assessment  Assessment details: Pt reports temporary relief in pelvic pain with manuals in therapy, but since pt reports she is unable to performing core strengthening through HEP, pt notes return in pelvic pain after a few days. Demonstrates no decline in pelvic stability or LE strength, but demonstrates overactivity of left anterior PFM (bulbo) and decrease in strength in posterior PFM. Improved posterior PFM contraction noted when instructed pt activate TA alone.  Patient would benefit from further skilled PT to decrease pain and increase core/PFM strength to maximize functional capacity. Impairments: abnormal muscle tone, abnormal or restricted ROM, impaired physical strength, lacks appropriate home exercise program, pain with function and poor posture     Goals  Impairment Goals upon discharge  - Decrease left glut pain to 0-2/10 with sitting for 1 hour - not met  - Decrease back pain to 0-2/10 with ADLs - not met  - Improve pelvic stability with resisted SLR to 4+/5 - partially met  - Improve MMT for pelvic floor muscle (PFM) to greater than or equal to 4/5 in order to improve lumbopelvic stability and bladder control - not met  - Increase PFM endurance to 10 second hold for 10 reps - partially met  - Improve relaxation of PFM to 100% in 2 seconds - partially met  - Demonstrates appropriate breathing mechanics with pelvic floor relaxation and contraction for improved muscle coordination in 8 weeks - partially met    Functional Goals Upon Discharge  - Pt will be independent with home exercise program, with proper demonstration, rationale and understanding in order to improve functional abilities and quality of life.    - Patient is knowledgeable of postural and pelvic floor relaxation strategies to optimize toileting techniques - partially met  - Patient has implemented urge suppression strategies throughout the day and reports average voiding interval is 2-3 hours upon discharge in order to have more functional bladder functioning - partially met  - Patient reports decrease nocturia to 0-1 voids/night for more thorough sleep - partially met  - Patient is able to sit for 1 hour at [employer] without increase in left glut pain - not met    Plan  Patient would benefit from: women's health eval and skilled physical therapy  Planned modality interventions: biofeedback  Planned therapy interventions: manual therapy, neuromuscular re-education, patient education, self care, strengthening, stretching, home exercise program, behavior modification and therapeutic activities  Frequency: 1x week  Duration in weeks: 8  Plan of Care expiration date: 2023  Treatment plan discussed with: patient        Subjective Evaluation    History of Present Illness  Mechanism of injury: Pt reports relief in symptoms with PT sessions, but notes muscle spasm and pain returning after a few days. 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 Urethra Pain:  At worst: 9/10 (pain and urge to urinate)  At best: 3-4/10    Lumbar Pain:  At worst: 8/10 (lifting and holding baby and ADLs)  At best: 2-3/10     Bladder Function:     Voiding Difficulties positive for: straining      Voiding Difficulties comments:     Voiding frequency: every 1-2 hours    Urinary leakage: no urine leakage    Urinary leakage not aggravated by: coughing and sneezing    Nocturia (episodes per night): 0-2 (drinking too much water at night)    Painful urination: No      Intake (ounces):  Water: 40, Coffee: 16,   Incontinence Management:     Pads/Diaper Use:  None    Bowel Function:     Voiding DIfficulties: urgency      Bowel frequency: daily    Stool softener use: no stool softeners    Sexual Function:     Pain during intercourse: Yes, with insertion   Pain  At best pain ratin  At worst pain ratin  Location: Left Glut  Quality: dull ache          Objective     Strength/Myotome Testing     Left Hip   Planes of Motion   Flexion: 4 (mild pelvic instability)  Abduction: 4+  Adduction: 3+  External rotation: 4+  Internal rotation: 4    Right Hip   Planes of Motion   Flexion: 4+ (mild pelvic instability)  Abduction: 4+  Adduction: 4-  External rotation: 4+  Internal rotation: 4+      Abdominal Assessment:      Position: supine exam    Visual Inspection of Perineum:   PFM Contraction Comments: Severe muscle spasm in left bulbospongiosus, ischiocavernosus, super transverse perineal    Mild tension in right bulbospongiosus and super transverse perineal Pelvic Floor Muscle Exam:    75% pelvic floor relaxation   2 seconds required for complete relaxation. PERFECT Score   Right power: anterior and posterior 2/5 5 sec hold, 3 reps. Left power: Anterior 3/5 10 sec 2 sec, posterior 2/5 5 sec hold 2 sec.                      Precautions: history of pelvic pain, difficulty emptying bladder post   Focus: PFM relaxation, core strengthening, urge suppression/bladder retraining  Next visit focus on light TA contraction for PFM co-contraction,    Manuals 7/11 7/18 7/25 8/10 8/17 8/24 8/31 9/7     External PFM TPR in s/l nv MONET MONET  MONET MONET  MONET     TPR to hip adductor             STM to left bulbocavernosis + transverse perineal MONET MONET MONET MONET b/l MONET b/l externally in supine   [facility] internally  [facility] interla with TA contract-relax     TPR to glut/lumbar             Lumbar UPA mobs nv   Prone MONET Prone MONET Prone MONET Prone MONET  + thoracic Prone MONET     Sacral MFR    Prone MONET  +sacral PA mobs Prone  MONET + sacral PA mobs Prone MONET + sacral PA mobs Prone  MONET + sacral m PA mobs Prone MONET     Contract-Relax        TA 25% for slight PFM co-contraction -> deep breathing      Neuro Re-Ed             Quad TA pelvic tilt nv 10"x8  fatigued  10"x3  nv  nv     Supine TA (without PFM)        1"x10     S/l Clamshells      nv  nv     S/l reverse clam        nv     Seated TA nv     nv                    Ther Ex             Re-assessment performed       performed     Seated Happy Baby stretch    30"x2         Supine Happy Baby stretch      30"x1       Supine Butterfly Stretch      30"x       Piriformis stretch     reviewed        Standing Hip Adductor Stretch             SKC             Piriformis stretch             Child's Pose stretch    With over-pressure  manual   With over-pressure nv     Ther Activity                                       Gait Training                                       Modalities

## 2023-09-14 ENCOUNTER — OFFICE VISIT (OUTPATIENT)
Dept: PHYSICAL THERAPY | Facility: CLINIC | Age: 38
End: 2023-09-14
Payer: COMMERCIAL

## 2023-09-14 DIAGNOSIS — G89.29 CHRONIC LEFT HIP PAIN: ICD-10-CM

## 2023-09-14 DIAGNOSIS — M62.89 PELVIC FLOOR DYSFUNCTION IN FEMALE: Primary | ICD-10-CM

## 2023-09-14 DIAGNOSIS — M25.552 CHRONIC LEFT HIP PAIN: ICD-10-CM

## 2023-09-14 PROCEDURE — 97140 MANUAL THERAPY 1/> REGIONS: CPT

## 2023-09-14 PROCEDURE — 97112 NEUROMUSCULAR REEDUCATION: CPT

## 2023-09-14 NOTE — PROGRESS NOTES
Daily Note     Today's date: 2023  Patient name: Alejandra Amos  : 1985  MRN: [id number]  Referring provider: Lev Moreau DO  Dx:   Encounter Diagnosis     ICD-10-CM    1. Pelvic floor dysfunction in female  M62.89       2. Chronic left hip pain  M25.552     G89.29                      Subjective: Pt reports her left sided pain is just as bad as usual.       Objective: See treatment diary below      Assessment: Pt educated on importance of using dialots and wand at home as well at performing her HEP. Pt educated on partner help with wand as well. Plan: Continue per plan of care.       Precautions: history of pelvic pain, difficulty emptying bladder post   Focus: PFM relaxation, core strengthening, urge suppression/bladder retraining  Next visit focus on light TA contraction for PFM co-contraction,    Manuals 7/18 7/25 8/10 8/17 8/24 8/31 9/7 9/14    External PFM TPR in s/l [address] [place] [address] HA    TPR to hip adductor            STM to left bulbocavernosis + transverse perineal MONET MONET MONET b/l MONET b/l externally in supine   [facility] internally  [facility] interla with TA contract-relax     TPR to glut/lumbar            Lumbar UPA mobs   Prone MONET Prone MONET Prone MONET Prone MONET  + thoracic Prone MONET     Sacral MFR   Prone MONET  +sacral PA mobs Prone  MONET + sacral PA mobs Prone MONET + sacral PA mobs Prone  MONET + sacral m PA mobs Prone MONET Prone HA    Contract-Relax       TA 25% for slight PFM co-contraction -> deep breathing      Neuro Re-Ed            Quad TA pelvic tilt 10"x8  fatigued  10"x3  nv  nv     Supine TA (without PFM)       1"x10 5"x10    S/l Clamshells     nv  nv     S/l reverse clam       nv     Seated TA     nv   x10    Diaphragmatic breathing         x10    Ther Ex            Re-assessment       performed     Seated Happy Baby stretch   30"x2         Supine Happy Baby stretch     30"x1       Supine Butterfly Stretch     30"x       Piriformis stretch    reviewed    TPR HA    Standing Hip Adductor Stretch SKC            Piriformis stretch            Child's Pose stretch   With over-pressure  manual   With over-pressure nv     Ther Activity                                    Gait Training                                    Modalities

## 2023-09-21 ENCOUNTER — OFFICE VISIT (OUTPATIENT)
Dept: PHYSICAL THERAPY | Facility: CLINIC | Age: 38
End: 2023-09-21
Payer: COMMERCIAL

## 2023-09-21 DIAGNOSIS — M62.89 PELVIC FLOOR DYSFUNCTION IN FEMALE: Primary | ICD-10-CM

## 2023-09-21 DIAGNOSIS — G89.29 CHRONIC LEFT HIP PAIN: ICD-10-CM

## 2023-09-21 DIAGNOSIS — M25.552 CHRONIC LEFT HIP PAIN: ICD-10-CM

## 2023-09-21 PROCEDURE — 97140 MANUAL THERAPY 1/> REGIONS: CPT | Performed by: PHYSICAL THERAPIST

## 2023-09-21 NOTE — PROGRESS NOTES
Daily Note     Today's date: 2023  Patient name: Kaur Cody  : 1985  MRN: [id number]  Referring provider: Lillie Maldonado DO  Dx:   Encounter Diagnosis     ICD-10-CM    1. Pelvic floor dysfunction in female  M62.89       2. Chronic left hip pain  M25.552     G89.29                      Subjective: Pt continues to experience left glut pain and urethral irritation, but notes relief in left glut pain for a few days following last tx session. Objective: See treatment diary below      Assessment: Demonstrated minimal left posterior PFM tension, but severe spasm noted in left transverse perineal muscle and left bulbo. Performed MFR and mobs to sacrum and L5-S1 with increase in fascial mobility noted. Demonstrated moderate relaxation of left bulbo muscle with STM following sacral manuals. Patient would benefit from continued PT      Plan: Continue per plan of care.       Precautions: history of pelvic pain, difficulty emptying bladder post   Focus: PFM relaxation, core strengthening, urge suppression/bladder retraining  Next visit focus on light TA contraction for PFM co-contraction,    Manuals 7/18 7/25 8/10 8/17 8/24 8/31 9/7 9/14 9/21   External PFM TPR in s/l [facility] HA MONET Left   TPR to hip adductor            STM to left bulbocavernosis + transverse perineal MONET MONET MONET b/l MONET b/l externally in supine   [facility] internally  [facility] interla with TA contract-relax  internal MONET   TPR to glut/lumbar            Lumbar UPA mobs   Prone MONET Prone MONET Prone MNOET Prone MONET  + thoracic Prone MONET  MFR to L5-S1   Sacral MFR   Prone MONET  +sacral PA mobs Prone  MONET + sacral PA mobs Prone MONET + sacral PA mobs Prone  MONET + sacral m PA mobs Prone MONET Prone HA Prone MONET   Contract-Relax       TA 25% for slight PFM co-contraction -> deep breathing      Neuro Re-Ed            Quad TA pelvic tilt 10"x8  fatigued  10"x3  nv  nv     Supine TA (without PFM)       1"x10 5"x10    S/l Clamshells     nv  nv     S/l reverse clam Subjective   Patient ID: Mirela Woodard is a 22 y.o. female who is with chief complaint of left sided chest pain.   HPI  Patient is with complaint of left sided lower rib pain: achy / sharp, 4-8/10, located on the lower ribs from left midaxillary to left scapular line, intermittent, precipitated by inspiration, coughing,  and movement. She states this started about 1 week ago.  -------------------------------------  Back Pain (Patient states she has been having back left rib pain for 1+ week. she states she has not taken any OTC medication.) and Flank Pain (Left side pain radiates from flank to the back. Sitting pain is a 4-10 but increases  with coughing.)  Note by Alicja Roa  -------------------------------------------------------------    Review of Systems  General: no weight loss, generally healthy, no fatigue  Head:  no headaches / sinus pain, no vertigo, no injury  Eyes: no diplopia, no tearing, no pain,   Ears: no change in hearing, no tinnitus, no bleeding, no vertigo  Mouth:  no dental difficulties, no gingival bleeding, no sore throat, no loss of sense of taste  Nose: no congestion, no  discharge, no bleeding, no obstruction, no loss of sense of smell  Neck: no stiffness, no pain, no tenderness, no masses, no bruit  Pulmonary: no dyspnea, no wheezing, no hemoptysis, no cough  Cardiovascular: no chest pain, no palpitations, no syncope, no orthopnea  Gastrointestinal: no change in appetite, no dysphagia, no abdominal pains, no diarrhea, no emesis, no melena  Genito Urinary: no dysuria, no urinary urgency, no nocturia, no incontinence, no change in nature of urine  Musculoskeletal: (+) left sided lower rib pain, no limitation of range of motion, no paresthesia, no numbness  Constitutional: no fever, no chills, no night sweats    Objective   Physical Exam  General: ambulatory, in no acute distress  Chest: symmetrical chest expansion, no lagging, no retractions, clear breath sounds, no rales, no  nv     Seated TA     nv   x10    Diaphragmatic breathing         x10    Ther Ex            Re-assessment       performed     Seated Happy Baby stretch   30"x2         Supine Happy Baby stretch     30"x1       Supine Butterfly Stretch     30"x       Piriformis stretch    reviewed    TPR HA    Standing Hip Adductor Stretch            SKC            Piriformis stretch            Child's Pose stretch   With over-pressure  manual   With over-pressure nv     Ther Activity                                    Gait Training                                    Modalities wheezes  Heart: normal rate, regular rhythm, no heaves, no thrills, no murmurs  Abdomen: soft, non-tender, normoactive bowel sounds, no mass palpated  Musculoskeletal: (+) tenderness on lower rib areas on the left side of the chest, no limitation of range of motion, no paralysis, no deformity  Extremities: full and equal peripheral pulses, no edema,    Assessment/Plan   Problem List Items Addressed This Visit    None  Visit Diagnoses       Acute costochondritis    -  Primary    Rib pain on left side        Relevant Medications    diclofenac (Voltaren) 75 mg EC tablet    Other Relevant Orders    XR ribs unilateral (Completed)    Musculoskeletal chest pain        Relevant Medications    diclofenac (Voltaren) 75 mg EC tablet    methocarbamol (Robaxin) 500 mg tablet        DISCHARGE SUMMARY:   Patient was seen and examined. Diagnosis, treatment, treatment options, and possible complications of today's illness discussed and explained to patient. Patient to take medication/s associated with this visit.  Patient may use OTC menthol patches as needed for comfort. Advised stretching and warm up exercises as tolerated prior to more intense physical exertion / exercise.  Advised to avoid heavy lifting, pulling, or pushing for at least a week. Reinforce stretching and exercises that strengthen core muscles. Patient to come back in 4 - 7 days if needed for worsening symptoms. Patient verbalized understanding of plan of care.    Patient to come back in 7 - 10 days if needed for worsening symptoms.

## 2023-09-28 ENCOUNTER — OFFICE VISIT (OUTPATIENT)
Dept: PHYSICAL THERAPY | Facility: CLINIC | Age: 38
End: 2023-09-28
Payer: COMMERCIAL

## 2023-09-28 DIAGNOSIS — G89.29 CHRONIC LEFT HIP PAIN: ICD-10-CM

## 2023-09-28 DIAGNOSIS — M25.552 CHRONIC LEFT HIP PAIN: ICD-10-CM

## 2023-09-28 DIAGNOSIS — M62.89 PELVIC FLOOR DYSFUNCTION IN FEMALE: Primary | ICD-10-CM

## 2023-09-28 PROCEDURE — 97140 MANUAL THERAPY 1/> REGIONS: CPT | Performed by: PHYSICAL THERAPIST

## 2023-09-28 NOTE — PROGRESS NOTES
Daily Note     Today's date: 2023  Patient name: Adriana Stanley  : 1985  MRN: [id number]  Referring provider: Walker Shaffer DO  Dx:   Encounter Diagnosis     ICD-10-CM    1. Pelvic floor dysfunction in female  M62.89       2. Chronic left hip pain  M25.552     G89.29                      Subjective: Pt reports severe urethral pain the last week. Objective: See treatment diary below      Assessment: Pt noted left SI pain with prone pre tx. Pt tolerated sacral MFR/mobs with increase in mobility noted. Demonstrated improved release of left bulbo muscle with internal STM, but ttp and tension still noted in left transverse perineal muscle. Encouraged pt to focus on TA exercise throughout the day. Patient would benefit from continued PT      Plan: Continue per plan of care.       Precautions: history of pelvic pain, difficulty emptying bladder post   Focus: PFM relaxation, core strengthening, urge suppression/bladder retraining  Next visit focus on light TA contraction for PFM co-contraction,    Manuals    External PFM TPR in s/l       MONET ARCOS MONET Left   TPR to hip adductor            STM to left bulbocavernosis + transverse perineal MONET      interla with TA contract-relax  internal MONET   TPR to glut/lumbar            Lumbar UPA mobs MONET      Prone MONET  MFR to L5-S1   Sacral MFR MONET      Prone MONET Prone HA Prone MONET   Contract-Relax             Neuro Re-Ed            Quad TA pelvic tilt       nv     Supine TA (without PFM) In prone  5"x5      1"x10 5"x10    S/l Clamshells       nv     S/l reverse clam       nv     Seated TA        x10    Diaphragmatic breathing         x10    Ther Ex            Re-assessment       performed     Seated Happy Baby stretch            Supine Happy Baby stretch            Supine Butterfly Stretch            Piriformis stretch        TPR HA    Standing Hip Adductor Stretch            SKC            Piriformis stretch            Child's Pose stretch nv     Ther Activity                                    Gait Training                                    Modalities

## 2023-10-03 ENCOUNTER — OFFICE VISIT (OUTPATIENT)
Dept: PHYSICAL THERAPY | Facility: CLINIC | Age: 38
End: 2023-10-03
Payer: COMMERCIAL

## 2023-10-03 DIAGNOSIS — M25.552 CHRONIC LEFT HIP PAIN: ICD-10-CM

## 2023-10-03 DIAGNOSIS — G89.29 CHRONIC LEFT HIP PAIN: ICD-10-CM

## 2023-10-03 DIAGNOSIS — M62.89 PELVIC FLOOR DYSFUNCTION IN FEMALE: Primary | ICD-10-CM

## 2023-10-03 PROCEDURE — 97140 MANUAL THERAPY 1/> REGIONS: CPT | Performed by: PHYSICAL THERAPIST

## 2023-10-03 NOTE — PROGRESS NOTES
Daily Note     Today's date: 10/3/2023  Patient name: Brant Ospina  : 1985  MRN: [id number]  Referring provider: Andre Null DO  Dx:   Encounter Diagnosis     ICD-10-CM    1. Pelvic floor dysfunction in female  M62.89       2. Chronic left hip pain  M25.552     G89.29                      Subjective: Pt reports decrease in pelvic pain for 2 days following last visit. Pt notes severe urethral irritation today with frequent urge to urinate. Objective: See treatment diary below      Assessment: Demonstrates minimal release of sacral hypomobility and fascial tension in prone today compared to prior sessions. Decrease in b/l PFM tension noted with TPR. Performed external STM to left bulbo and transverse perineal per pt requested to due menstrual cycle. Patient would benefit from continued PT      Plan: Continue per plan of care.       Precautions: history of pelvic pain, difficulty emptying bladder post   Focus: PFM relaxation, core strengthening, urge suppression/bladder retraining  Next visit focus on light TA contraction for PFM co-contraction,    Manuals 9/28 10/3     9/7 9/14 9/21   External PFM TPR in s/l  MONET  B/l in prone and s/l     MONET HA MONET Left   TPR to hip adductor  MONET          STM to left bulbocavernosis + transverse perineal MONET externally in supine     interla with TA contract-relax  internal  [facility]   TPR to glut/lumbar            Lumbar UPA mobs MONET MONET     Prone MONET  MFR to L5-S1   Sacral MFR MONET MONET     Prone MONET Prone HA Prone MONET   Contract-Relax             Neuro Re-Ed            Quad TA pelvic tilt       nv     Supine TA (without PFM) In prone  5"x5      1"x10 5"x10    S/l Clamshells       nv     S/l reverse clam       nv     Seated TA        x10    Diaphragmatic breathing         x10    Ther Ex            Re-assessment       performed     Seated Happy Baby stretch            Supine Happy Baby stretch            Supine Butterfly Stretch            Piriformis stretch        TPR HA Standing Hip Adductor Stretch            SKC            Piriformis stretch            Child's Pose stretch       nv     Ther Activity                                    Gait Training                                    Modalities

## 2023-10-10 ENCOUNTER — OFFICE VISIT (OUTPATIENT)
Dept: PHYSICAL THERAPY | Facility: CLINIC | Age: 38
End: 2023-10-10
Payer: COMMERCIAL

## 2023-10-10 DIAGNOSIS — M25.552 CHRONIC LEFT HIP PAIN: ICD-10-CM

## 2023-10-10 DIAGNOSIS — G89.29 CHRONIC LEFT HIP PAIN: ICD-10-CM

## 2023-10-10 DIAGNOSIS — M62.89 PELVIC FLOOR DYSFUNCTION IN FEMALE: Primary | ICD-10-CM

## 2023-10-10 PROCEDURE — 97140 MANUAL THERAPY 1/> REGIONS: CPT | Performed by: PHYSICAL THERAPIST

## 2023-10-10 NOTE — PROGRESS NOTES
Daily Note     Today's date: 10/10/2023  Patient name: Jun Dhillon  : 1985  MRN: [id number]  Referring provider: Pedro Pedersen DO  Dx:   Encounter Diagnosis     ICD-10-CM    1. Pelvic floor dysfunction in female  M62.89       2. Chronic left hip pain  M25.552     G89.29                      Subjective: Pt reports increase in left side abdominal pain over the last few weeks. Pt went to urgent care who performed an x-ray which possibly reveals gallstones. Pt is schedule to see a physician soon for further testing. Pt still notes left glut/pelvic floor pain. Objective: See treatment diary below      Assessment: Pt noted mild decrease in lumbar tension with VFM compression to pelvis/hips. Minimal tension noted in left posterior PFM, but tension noted in left piriformis, hip adductor tension, and bulbo. Pt tolerated STM/TPR with mild decrease in pain reported. Patient would benefit from continued PT      Plan: Continue per plan of care.       Precautions: history of pelvic pain, difficulty emptying bladder post   Focus: PFM relaxation, core strengthening, urge suppression/bladder retraining  Next visit focus on light TA contraction for PFM co-contraction,    Manuals 9/28 10/3 10/10    9/7 9/14 9/21   External PFM TPR in s/l  MONET  B/l in prone and s/l MONET    MONET HA MONET Left   TPR to hip adductor  MONET MONET         STM to left bulbocavernosis + transverse perineal MONET externally in supine external bulbo + transvere perineal    interla with TA contract-relax  internal  [facility]   TPR to glut/lumbar            Lumbar UPA mobs MONET MONET     Prone MONET  MFR to L5-S1   Sacral MFR MONET MONET In s/l  MONET    Prone MONET Prone HA Prone MONET   Contract-Relax             Neuro Re-Ed            Quad TA pelvic tilt       nv     Supine TA (without PFM) In prone  5"x5      1"x10 5"x10    S/l Clamshells       nv     S/l reverse clam       nv     Seated TA        x10    Diaphragmatic breathing         x10    Ther Ex            Re-assessment performed     Seated Happy Baby stretch            Supine Happy Baby stretch            Supine Butterfly Stretch            Piriformis stretch        TPR HA    Standing Hip Adductor Stretch            SKC            Piriformis stretch            Child's Pose stretch       nv     Ther Activity                                    Gait Training                                    Modalities

## 2023-10-17 ENCOUNTER — APPOINTMENT (OUTPATIENT)
Dept: PHYSICAL THERAPY | Facility: CLINIC | Age: 38
End: 2023-10-17
Payer: COMMERCIAL

## 2023-10-24 ENCOUNTER — OFFICE VISIT (OUTPATIENT)
Dept: PHYSICAL THERAPY | Facility: CLINIC | Age: 38
End: 2023-10-24
Payer: COMMERCIAL

## 2023-10-24 DIAGNOSIS — M62.89 PELVIC FLOOR DYSFUNCTION IN FEMALE: Primary | ICD-10-CM

## 2023-10-24 DIAGNOSIS — G89.29 CHRONIC LEFT HIP PAIN: ICD-10-CM

## 2023-10-24 DIAGNOSIS — M25.552 CHRONIC LEFT HIP PAIN: ICD-10-CM

## 2023-10-24 PROCEDURE — 97140 MANUAL THERAPY 1/> REGIONS: CPT | Performed by: PHYSICAL THERAPIST

## 2023-10-24 NOTE — PROGRESS NOTES
Daily Note     Today's date: 10/24/2023  Patient name: Kate Lea  : 1985  MRN: [id number]  Referring provider: Quique Sagastume DO  Dx:   Encounter Diagnosis     ICD-10-CM    1. Pelvic floor dysfunction in female  M62.89       2. Chronic left hip pain  M25.552     G89.29                      Subjective: Pt continues to complain of upper left quadrant abdominal pain with no answers from ER. Pt is waiting for scheduled PCP appointment in November to discuss abdominal pain. Objective: See treatment diary below      Assessment: Demonstrates moderate deep fascial tension throughout lumbar, thoracic, lower and upper abdominal. Attempted VFM compression and VFM to lumbar, thoracic and cervical with minimal decrease in fascial tension and no change in left upper quadrant pain. Demonstrated ttp and hypomobility in mid thoracic, pt reports no change in abdominal pain following PA/UPA mobs to thoracic spine. Performed external TPR to posterior PFM around coccyx with mild decrease in tension noted. Patient would benefit from continued PT. Plan: Continue per plan of care.       Precautions: history of pelvic pain, difficulty emptying bladder post   Focus: PFM relaxation, core strengthening, urge suppression/bladder retraining  Next visit focus on light TA contraction for PFM co-contraction,    Manuals 9/28 10/3 10/10 10/24   9/7 9/14 9/21   External PFM TPR in s/l  MONET  B/l in prone and s/l MONET Prone MONET  Around coccyx   MONET HA MONET Left   TPR to hip adductor   [facility] MONET         STM to left bulbocavernosis + transverse perineal MONET externally in supine external bulbo + transvere perineal nv   interla with TA contract-relax  internal  [facility]   TPR to glut/lumbar            Lumbar UPA mobs MONET MONET  Thoracic PA/UPA mobs   Prone MONET  MFR to L5-S1   Sacral MFR MONET MONET In s/l  MONET    Prone MONET Prone HA Prone MONET   VFM    Lumbar, thoracic, cervical, abdomen  attempted        Neuro Re-Ed            Quad TA pelvic tilt       nv Supine TA (without PFM) In prone  5"x5      1"x10 5"x10    S/l Clamshells       nv     S/l reverse clam       nv     Seated TA        x10    Diaphragmatic breathing         x10    Ther Ex            Re-assessment       performed     Seated Happy Baby stretch            Supine Happy Baby stretch            Supine Butterfly Stretch            Piriformis stretch        TPR HA    Standing Hip Adductor Stretch            SKC            Piriformis stretch            Child's Pose stretch       nv     Ther Activity                                    Gait Training                                    Modalities

## 2023-11-02 ENCOUNTER — APPOINTMENT (OUTPATIENT)
Dept: PHYSICAL THERAPY | Facility: CLINIC | Age: 38
End: 2023-11-02
Payer: COMMERCIAL

## 2023-11-07 ENCOUNTER — EVALUATION (OUTPATIENT)
Dept: PHYSICAL THERAPY | Facility: CLINIC | Age: 38
End: 2023-11-07
Payer: COMMERCIAL

## 2023-11-07 DIAGNOSIS — G89.29 CHRONIC LEFT HIP PAIN: ICD-10-CM

## 2023-11-07 DIAGNOSIS — M62.89 PELVIC FLOOR DYSFUNCTION IN FEMALE: Primary | ICD-10-CM

## 2023-11-07 DIAGNOSIS — M25.552 CHRONIC LEFT HIP PAIN: ICD-10-CM

## 2023-11-07 DIAGNOSIS — G89.29 CHRONIC PELVIC PAIN IN FEMALE: ICD-10-CM

## 2023-11-07 DIAGNOSIS — R10.2 CHRONIC PELVIC PAIN IN FEMALE: ICD-10-CM

## 2023-11-07 PROCEDURE — 97140 MANUAL THERAPY 1/> REGIONS: CPT | Performed by: PHYSICAL THERAPIST

## 2023-11-07 PROCEDURE — 97110 THERAPEUTIC EXERCISES: CPT | Performed by: PHYSICAL THERAPIST

## 2023-11-07 NOTE — LETTER
2023    Laila Figueroa [address] [facility] [place]    Patient: Sariah Brunner   YOB: 1985   Date of Visit: 2023     Encounter Diagnosis     ICD-10-CM    1. Pelvic floor dysfunction in female  M62.89       2. Chronic left hip pain  M25.552     G89.29       3. Chronic pelvic pain in female  R10.2     G89.29           Dear Dr. Lorri Hobson:    Please review the attached re-evaluation summary from Marilu's recent visit. Please verify that you agree with the plan of care by signing the attached order. If you have any questions or concerns, please do not hesitate to call. I sincerely appreciate the opportunity to share in the care of one of your patients and hope to have another opportunity to work with you in the near future. Sincerely,    Fidelina Knutson, PT      Referring Provider:      I certify that I have read the below Plan of Care and certify the need for these services furnished under this plan of treatment while under my care. Laila Figueroa DO  [address]  Via Fax: [phone]          PT Re-Evaluation     Today's date: 2023  Patient name: Sariah Brunner  : 1985  MRN: [id number]  Referring provider: Laila Figueroa DO  Dx:   Encounter Diagnosis     ICD-10-CM    1. Pelvic floor dysfunction in female  M62.89       2. Chronic left hip pain  M25.552     G89.29       3. Chronic pelvic pain in female  R10.2     G89.29                      Assessment  Assessment details: Patient continues to struggle with chronic pelvic pain, including left glut pain, left bulbocavernosus muscle spasm, and lower back pain. Demonstrates increase in LE/cores stability overall, but still demonstrates decrease in hip adductor strength and tension in L>R PFM, glut, and iliopsoas muscles. Patient would benefit from further skilled PT to address chronic pain to improve quality of life.    Impairments: abnormal muscle tone, abnormal or restricted ROM, impaired physical strength, lacks appropriate home exercise program, pain with function and poor posture     Goals  Impairment Goals upon discharge  - Decrease left glut pain to 0-2/10 with sitting for 1 hour - not met  - Decrease back pain to 0-2/10 with ADLs - not met  - Improve pelvic stability with resisted SLR to 4+/5 - partially met  - Improve MMT for pelvic floor muscle (PFM) to greater than or equal to 4/5 in order to improve lumbopelvic stability and bladder control - not met  - Increase PFM endurance to 10 second hold for 10 reps - not tested  - Improve relaxation of PFM to 100% in 2 seconds - not tested  - Demonstrates appropriate breathing mechanics with pelvic floor relaxation and contraction for improved muscle coordination in 8 weeks - partially met    Functional Goals Upon Discharge  - Pt will be independent with home exercise program, with proper demonstration, rationale and understanding in order to improve functional abilities and quality of life - not met   - Patient is knowledgeable of postural and pelvic floor relaxation strategies to optimize toileting techniques - partially met  - Patient has implemented urge suppression strategies throughout the day and reports average voiding interval is 2-3 hours upon discharge in order to have more functional bladder functioning - partially met  - Patient reports decrease nocturia to 0-1 voids/night for more thorough sleep - met  - Patient is able to sit for 1 hour at [employer] without increase in left glut pain - not met           Plan  Patient would benefit from: skilled physical therapy  Planned therapy interventions: manual therapy, neuromuscular re-education, patient education, self care, strengthening, stretching, home exercise program, behavior modification and therapeutic activities  Frequency: 1x week  Duration in weeks: 8  Plan of Care expiration date: 1/2/2024  Treatment plan discussed with: patient        Subjective Evaluation    History of Present Illness  Mechanism of injury: Pt reports relief in left glut pain end of last week following acupuncture session and first appointment with a neuromuscular massage therapist. But notes return in left glut pain the last two days. Pt noted temporary relief in urethral irritation with decrease in left glut pain, but notes return in urethral pain/irritation. Urethra Pain:  At worst: 8-9/10 (pain and urge to urinate)  At best: 1-2/10 (following neuromuscular massage)    Lumbar Pain:  At worst: 9/10 (lifting and holding baby and ADLs)  At best: 2-3/10     Bladder Function:     Voiding Difficulties positive for: straining      Voiding Difficulties comments:     Voiding frequency: every 1 hour    Urinary leakage: yes (sneezing when on menstrual cycle)    Urinary leakage not aggravated by: coughing and sneezing    Nocturia (episodes per night): 1     Painful urination: No      Intake (ounces):  Water: 40, Coffee: 16,   Incontinence Management:     Pads/Diaper Use:  None    Bowel Function:     Voiding DIfficulties: urgency      Bowel frequency: daily    Stool softener use: no stool softeners    Sexual Function:     Pain during intercourse: Yes, with insertion  Pain  At best pain ratin  At worst pain ratin  Location: Left Glut          Objective     Active Range of Motion     Lumbar   Flexion:  with pain Restriction level: minimal  Extension:  Restriction level: minimal  Left lateral flexion:  Restriction level: maximal  Right lateral flexion:  Restriction level: maximal  Left rotation:  Restriction level: moderate  Right rotation:  Restriction level: moderate    Strength/Myotome Testing     Left Hip   Planes of Motion   Flexion: 4+ (pelvic stability, strain in left groin)  Extension: 4-  Abduction: 4+  Adduction: 3+  External rotation: 4+  Internal rotation: 4+    Right Hip   Planes of Motion   Flexion: 4+ (mild pelvic instability)  Extension: 4  Abduction: 4+  Adduction: 3+  External rotation: 4+  Internal rotation: 4-    Additional Strength Details  Palpation:  Severe ttp and muscle spasm in L>R iliopsoas muscle  Moderate ttp to lumbar pvm, QL, and glut      Abdominal Assessment:      Abdominal Assessment: Pt declined internal vaginal pelvic floor assessment today due to currently on menstrual cycle. Performed external palpation in prone. Demonstrated severe muscle tension in posterior and anterior left PFM with decrease in tension to moderate tension noted with TPR/STM.                 Precautions: history of pelvic pain, difficulty emptying bladder post   Focus: PFM relaxation, core strengthening, urge suppression/bladder retraining  Next visit focus on light TA contraction for PFM co-contraction,    Manuals 9/28 10/3 10/10 10/24 11/7       External PFM TPR in s/l  MONET  B/l in prone and s/l MONET Prone MONET  Around coccyx Prone MONET, around coccyx       TPR to hip adductor  MONET MONET         STM to left bulbocavernosis + transverse perineal MONET externally in supine external bulbo + transvere perineal nv nv       TPR to glut/lumbar            Lumbar UPA mobs MONET MONET  Thoracic PA/UPA mobs nv       Sacral MFR MONET MONET In s/l  MONET  MONET       VFM    Lumbar, thoracic, cervical, abdomen  attempted Hip flexor b/l       Neuro Re-Ed            Quad TA pelvic tilt            Supine TA (without PFM) In prone  5"x5           S/l Clamshells            S/l reverse clam            Seated TA            Diaphragmatic breathing             Ther Ex            Re-assessment     perform       Seated Happy Baby stretch            Supine Happy Baby stretch            Supine Butterfly Stretch            Piriformis stretch            Standing Hip Adductor Stretch     nv       Standing hip flexor stretch with chair     30"x1 ea       Piriformis stretch            Child's Pose stretch            Ther Activity                                    Gait Training Modalities

## 2023-11-07 NOTE — PROGRESS NOTES
PT Re-Evaluation     Today's date: 2023  Patient name: Kaur Cody  : 1985  MRN: [id number]  Referring provider: Lillie Maldonado DO  Dx:   Encounter Diagnosis     ICD-10-CM    1. Pelvic floor dysfunction in female  M62.89       2. Chronic left hip pain  M25.552     G89.29       3. Chronic pelvic pain in female  R10.2     G89.29                      Assessment  Assessment details: Patient continues to struggle with chronic pelvic pain, including left glut pain, left bulbocavernosus muscle spasm, and lower back pain. Demonstrates increase in LE/cores stability overall, but still demonstrates decrease in hip adductor strength and tension in L>R PFM, glut, and iliopsoas muscles. Patient would benefit from further skilled PT to address chronic pain to improve quality of life.    Impairments: abnormal muscle tone, abnormal or restricted ROM, impaired physical strength, lacks appropriate home exercise program, pain with function and poor posture     Goals  Impairment Goals upon discharge  - Decrease left glut pain to 0-2/10 with sitting for 1 hour - not met  - Decrease back pain to 0-2/10 with ADLs - not met  - Improve pelvic stability with resisted SLR to 4+/5 - partially met  - Improve MMT for pelvic floor muscle (PFM) to greater than or equal to 4/5 in order to improve lumbopelvic stability and bladder control - not met  - Increase PFM endurance to 10 second hold for 10 reps - not tested  - Improve relaxation of PFM to 100% in 2 seconds - not tested  - Demonstrates appropriate breathing mechanics with pelvic floor relaxation and contraction for improved muscle coordination in 8 weeks - partially met    Functional Goals Upon Discharge  - Pt will be independent with home exercise program, with proper demonstration, rationale and understanding in order to improve functional abilities and quality of life - not met   - Patient is knowledgeable of postural and pelvic floor relaxation strategies to optimize toileting techniques - partially met  - Patient has implemented urge suppression strategies throughout the day and reports average voiding interval is 2-3 hours upon discharge in order to have more functional bladder functioning - partially met  - Patient reports decrease nocturia to 0-1 voids/night for more thorough sleep - met  - Patient is able to sit for 1 hour at [facility] without increase in left glut pain - not met           Plan  Patient would benefit from: skilled physical therapy  Planned therapy interventions: manual therapy, neuromuscular re-education, patient education, self care, strengthening, stretching, home exercise program, behavior modification and therapeutic activities  Frequency: 1x week  Duration in weeks: 8  Plan of Care expiration date: 1/2/2024  Treatment plan discussed with: patient        Subjective Evaluation    History of Present Illness  Mechanism of injury: Pt reports relief in left glut pain end of last week following acupuncture session and first appointment with a neuromuscular massage therapist. But notes return in left glut pain the last two days. Pt noted temporary relief in urethral irritation with decrease in left glut pain, but notes return in urethral pain/irritation. Urethra Pain:  At worst: 8-9/10 (pain and urge to urinate)  At best: 1-2/10 (following neuromuscular massage)    Lumbar Pain:  At worst: 9/10 (lifting and holding baby and ADLs)  At best: 2-3/10     Bladder Function:     Voiding Difficulties positive for: straining      Voiding Difficulties comments:     Voiding frequency: every 1 hour    Urinary leakage: yes (sneezing when on menstrual cycle)    Urinary leakage not aggravated by: coughing and sneezing    Nocturia (episodes per night): 1     Painful urination: No      Intake (ounces):  Water: 40, Coffee: 16,   Incontinence Management:     Pads/Diaper Use:  None    Bowel Function:     Voiding DIfficulties: urgency      Bowel frequency: daily    Stool softener use: no stool softeners    Sexual Function:     Pain during intercourse: Yes, with insertion  Pain  At best pain ratin  At worst pain ratin  Location: Left Glut          Objective     Active Range of Motion     Lumbar   Flexion:  with pain Restriction level: minimal  Extension:  Restriction level: minimal  Left lateral flexion:  Restriction level: maximal  Right lateral flexion:  Restriction level: maximal  Left rotation:  Restriction level: moderate  Right rotation:  Restriction level: moderate    Strength/Myotome Testing     Left Hip   Planes of Motion   Flexion: 4+ (pelvic stability, strain in left groin)  Extension: 4-  Abduction: 4+  Adduction: 3+  External rotation: 4+  Internal rotation: 4+    Right Hip   Planes of Motion   Flexion: 4+ (mild pelvic instability)  Extension: 4  Abduction: 4+  Adduction: 3+  External rotation: 4+  Internal rotation: 4-    Additional Strength Details  Palpation:  Severe ttp and muscle spasm in L>R iliopsoas muscle  Moderate ttp to lumbar pvm, QL, and glut      Abdominal Assessment:      Abdominal Assessment: Pt declined internal vaginal pelvic floor assessment today due to currently on menstrual cycle. Performed external palpation in prone. Demonstrated severe muscle tension in posterior and anterior left PFM with decrease in tension to moderate tension noted with TPR/STM.                 Precautions: history of pelvic pain, difficulty emptying bladder post   Focus: PFM relaxation, core strengthening, urge suppression/bladder retraining  Next visit focus on light TA contraction for PFM co-contraction,    Manuals 9/28 10/3 10/10 10/24 11/7       External PFM TPR in s/l  MONET  B/l in prone and s/l MONET Prone MONET  Around coccyx Prone MONET, around coccyx       TPR to hip adductor  [address]         STM to left bulbocavernosis + transverse perineal MONET externally in supine external bulbo + transvere perineal nv nv       TPR to glut/lumbar            Lumbar UPA mobs [address] Thoracic PA/UPA mobs nv       Sacral MFR MONET HEALY In s/l  MONET HEALY       VFM    Lumbar, thoracic, cervical, abdomen  attempted Hip flexor b/l       Neuro Re-Ed            Quad TA pelvic tilt            Supine TA (without PFM) In prone  5"x5           S/l Clamshells            S/l reverse clam            Seated TA            Diaphragmatic breathing             Ther Ex            Re-assessment     perform       Seated Happy Baby stretch            Supine Happy Baby stretch            Supine Butterfly Stretch            Piriformis stretch            Standing Hip Adductor Stretch     nv       Standing hip flexor stretch with chair     30"x1 ea       Piriformis stretch            Child's Pose stretch            Ther Activity                                    Gait Training                                    Modalities

## 2023-11-22 ENCOUNTER — OFFICE VISIT (OUTPATIENT)
Dept: PHYSICAL THERAPY | Facility: CLINIC | Age: 38
End: 2023-11-22
Payer: COMMERCIAL

## 2023-11-22 DIAGNOSIS — M25.552 CHRONIC LEFT HIP PAIN: ICD-10-CM

## 2023-11-22 DIAGNOSIS — M62.89 PELVIC FLOOR DYSFUNCTION IN FEMALE: Primary | ICD-10-CM

## 2023-11-22 DIAGNOSIS — G89.29 CHRONIC PELVIC PAIN IN FEMALE: ICD-10-CM

## 2023-11-22 DIAGNOSIS — G89.29 CHRONIC LEFT HIP PAIN: ICD-10-CM

## 2023-11-22 DIAGNOSIS — R10.2 CHRONIC PELVIC PAIN IN FEMALE: ICD-10-CM

## 2023-11-22 PROCEDURE — 97112 NEUROMUSCULAR REEDUCATION: CPT | Performed by: PHYSICAL THERAPIST

## 2023-11-22 PROCEDURE — 97140 MANUAL THERAPY 1/> REGIONS: CPT | Performed by: PHYSICAL THERAPIST

## 2023-11-22 NOTE — PROGRESS NOTES
Daily Note     Today's date: 2023  Patient name: Bakari Escobar  : 1985  MRN: [id number]  Referring provider: Olivia Go DO  Dx:   Encounter Diagnosis     ICD-10-CM    1. Pelvic floor dysfunction in female  M62.89       2. Chronic left hip pain  M25.552     G89.29       3. Chronic pelvic pain in female  R10.2     G89.29                      Subjective: Pt c/o severe pain in lower back and left glut pre tx, but notes overall less urethral irritation. Objective: See treatment diary below      Assessment: Guided pt through gentle yoga breathing and body awareness (including PFM relaxation, diaphragmatic breathing, and body relaxation). Pt noted decrease in back pain following quadruped pelvic tilts, quadruped hip rocks, and resting in child's pose with 2 pillows between buttock and heels and head resting on a pillow. Performed VFM compression to pelvis and lumbar spine in child's pose. To address left glut pain performed TPR to posterior PFM and sacral MFR/mobs in prone with decrease in muscle tension noted and increase in sacral mobility. Encouraged pt to focus on down regulation of nervous system through breathing, gentle yoga and stretching. Patient would benefit from continued PT      Plan: Continue per plan of care.       Precautions: history of pelvic pain, difficulty emptying bladder post   Focus: PFM relaxation, core strengthening, urge suppression/bladder retraining  Next visit focus on light TA contraction for PFM co-contraction,    Manuals 9/28 10/3 10/10 10/24 11/7 11/22      External PFM TPR in s/l  MONET  B/l in prone and s/l MONET Prone MONET  Around coccyx Prone MONET, around coccyx Prone aorund coccyx  MONET      TPR to hip adductor   [facility] [place] [facility] MONET         STM to left bulbocavernosis + transverse perineal MONET externally in supine external bulbo + transvere perineal nv nv       TPR to glut/lumbar            Lumbar UPA mobs MONET MONET  Thoracic PA/UPA mobs nv       Sacral MFR MONET MONET In s/l  [place] VFM    Lumbar, thoracic, cervical, abdomen  attempted Hip flexor b/l VFM compression to pelvis, hips and lumbar  (In child's pose with 2 pillows b/w butt and heels, 1 pillow under head)      Neuro Re-Ed            Quad pelvic tilt      Gentle  10x      Quad hip rocks      Gentle 10x      Quad "tail wagging"      Gentle 10x      Prone TA (without PFM) 5"x5     5"x10      S/l Clamshells            S/l reverse clam            Seated TA            Diaphragmatic breathing       performed      Ther Ex            Re-assessment     perform       Seated Happy Baby stretch            Supine Happy Baby stretch            Supine Butterfly Stretch            Piriformis stretch            Standing Hip Adductor Stretch     nv       Standing hip flexor stretch with chair     30"x1 ea       Piriformis stretch            Child's Pose stretch      Resting 10 min during manuals      Ther Activity                                    Gait Training                                    Modalities

## 2023-11-30 ENCOUNTER — OFFICE VISIT (OUTPATIENT)
Dept: PHYSICAL THERAPY | Facility: CLINIC | Age: 38
End: 2023-11-30
Payer: COMMERCIAL

## 2023-11-30 DIAGNOSIS — M25.552 CHRONIC LEFT HIP PAIN: ICD-10-CM

## 2023-11-30 DIAGNOSIS — R10.2 CHRONIC PELVIC PAIN IN FEMALE: ICD-10-CM

## 2023-11-30 DIAGNOSIS — G89.29 CHRONIC PELVIC PAIN IN FEMALE: ICD-10-CM

## 2023-11-30 DIAGNOSIS — G89.29 CHRONIC LEFT HIP PAIN: ICD-10-CM

## 2023-11-30 DIAGNOSIS — M62.89 PELVIC FLOOR DYSFUNCTION IN FEMALE: Primary | ICD-10-CM

## 2023-11-30 PROCEDURE — 97140 MANUAL THERAPY 1/> REGIONS: CPT

## 2023-11-30 PROCEDURE — 97112 NEUROMUSCULAR REEDUCATION: CPT

## 2023-11-30 NOTE — PROGRESS NOTES
Daily Note     Today's date: 2023  Patient name: Nasir Oneal  : 1985  MRN: [id number]  Referring provider: Bertha Parekh DO  Dx:   Encounter Diagnosis     ICD-10-CM    1. Pelvic floor dysfunction in female  M62.89       2. Chronic left hip pain  M25.552     G89.29       3. Chronic pelvic pain in female  R10.2     G89.29                      Subjective: Pt reports having a very beneficial myofascial massage last week. Objective: See treatment diary below      Assessment: Pt reports some relief after tx session. Plan: Continue per plan of care.       Precautions: history of pelvic pain, difficulty emptying bladder post   Focus: PFM relaxation, core strengthening, urge suppression/bladder retraining  Next visit focus on light TA contraction for PFM co-contraction,    Manuals 9/28 10/3 10/10 10/24 11/7 11/22 11/30     External PFM TPR in s/l  MONET  B/l in prone and s/l MONET Prone MONET  Around coccyx Prone MONET, around coccyx Prone aorund coccyx  MONET      TPR to hip adductor  MONET MONET    HA     STM to left bulbocavernosis + transverse perineal MONET externally in supine external bulbo + transvere perineal nv nv       TPR to glut/lumbar            Lumbar UPA mobs MONET MONET  Thoracic PA/UPA mobs nv       Sacral MFR MONET MONET In s/l   [facility] MONET HA     VFM    Lumbar, thoracic, cervical, abdomen  attempted Hip flexor b/l VFM compression to pelvis, hips and lumbar  (In child's pose with 2 pillows b/w butt and heels, 1 pillow under head) Stacking HA     Neuro Re-Ed            Quad pelvic tilt      Gentle  10x Gentle 10x     Quad hip rocks      Gentle 10x Gentle 10x     Quad "tail wagging"      Gentle 10x Gentle 10x     Prone TA (without PFM) 5"x5     5"x10      S/l Clamshells            S/l reverse clam            Seated TA            Diaphragmatic breathing       performed      Ther Ex            Re-assessment     perform       Seated Happy Baby stretch            Supine Happy Baby stretch            Supine Butterfly Stretch            Piriformis stretch            Standing Hip Adductor Stretch     nv       Standing hip flexor stretch with chair     30"x1 ea       Piriformis stretch            Child's Pose stretch      Resting 10 min during manuals      Standing add stretch       20"x2 ea     Ther Activity                                    Gait Training                                    Modalities

## 2025-07-17 ENCOUNTER — EVALUATION (OUTPATIENT)
Dept: PHYSICAL THERAPY | Facility: CLINIC | Age: 40
End: 2025-07-17

## 2025-07-17 DIAGNOSIS — M62.89 OTHER SPECIFIED DISORDERS OF MUSCLE: ICD-10-CM

## 2025-07-17 DIAGNOSIS — G89.29 OTHER CHRONIC PAIN: ICD-10-CM

## 2025-07-17 DIAGNOSIS — R10.2 PELVIC AND PERINEAL PAIN: Primary | ICD-10-CM

## 2025-07-17 DIAGNOSIS — M25.552 PAIN IN LEFT HIP: ICD-10-CM

## 2025-07-17 PROCEDURE — 97162 PT EVAL MOD COMPLEX 30 MIN: CPT | Performed by: PHYSICAL THERAPIST

## 2025-07-17 NOTE — LETTER
2025    Elma Marie MD  [address] [place] [address]    Patient: Marilu Richards   YOB: 1985   Date of Visit: 2025     Encounter Diagnosis     ICD-10-CM    1. Pelvic and perineal pain  R10.2       2. Pain in left hip  M25.552       3. Other chronic pain  G89.29       4. Other specified disorders of muscle  M62.89           Dear Dr. Elma Marie MD:    Thank you for your recent referral of Marilu Richards. Please review the attached evaluation summary from Marilu's recent visit.     Please verify that you agree with the plan of care by signing the attached order.     Patient would benefit from referral to a pelvic specialist to discuss if patient is a good candidate for a pudendal nerve injection/block to address her chronic pelvic pain.    If you have any questions or concerns, please do not hesitate to call.     I sincerely appreciate the opportunity to share in the care of one of your patients and hope to have another opportunity to work with you in the near future.       Sincerely,    Michelle Machuca, PT      Referring Provider:      I certify that I have read the below Plan of Care and certify the need for these services furnished under this plan of treatment while under my care.                    Elma Marie MD  [address] [place], [place] [address]  Via Fax: [phone]          PT Evaluation     Today's date: 2025  Patient name: Marilu Richards  : 1985  MRN: [id number]  Referring provider: Elma Marie MD  Dx:   Encounter Diagnosis     ICD-10-CM    1. Pelvic and perineal pain  R10.2       2. Pain in left hip  M25.552       3. Other chronic pain  G89.29       4. Other specified disorders of muscle  M62.89                      Assessment  Impairments: abnormal muscle tone, abnormal or restricted ROM, impaired physical strength, lacks appropriate home exercise program and poor posture     Assessment details: Marilu Richards is a 39  y.o. female who is returning to pelvic PT due to recent severe flare-up of chronic left vaginal pain and persistent left pudendal nerve pain and left lower abdominal pain. Pelvic floor anatomy was explained to patient utilizing a pelvic model and examination technique was discussed, including all risks and precautions. Patient provided verbal and written consent for internal pelvic floor muscle assessment prior to examination. Demonstrates moderate-severe ttp and spasm in left bulbocavernosus and left ischiococcygeus muscle, reproduction of sx with palpation to left endopelvic fascia, decrease TA/core strength, decrease in PFM strength/endurance and decrease PFM relaxation. Demonstrated mild decrease in left bulbo. muscle spasm with STM to left endopelvic fascia. Patient presents with the below outlined deficits and is appropriate for skilled physical therapy in order to address deficits and ultimately meet goal of independent self management of condition. Thank you for this referral!    Patient would benefit from referral to a pelvic specialist to discuss if patient is a good candidate for a pudendal nerve injection/block to address her chronic pelvic pain.      Barriers to therapy: Limited availability with pelvic PT.    Goals  Impairment Goals upon discharge  - Decrease left pelvic pain to 0/10 at rest  - Improve MMT for pelvic floor muscle (PFM) to greater than or equal to 3/5 in order to improve lumbopelvic stability  - Increase b/l LE strength to 4+/5 to improve lumbopelvic stability  - Improve relaxation of PFM to 100% in 2 seconds  - Demonstrates appropriate breathing mechanics with pelvic floor relaxation and contraction for improved muscle coordination in 8 weeks    Functional Goals Upon Discharge  - Pt will be independent with home exercise program in order to improve functional abilities and quality of life  - Patient is knowledgeable of postural and pelvic floor relaxation strategies to optimize  toileting techniques for improved bowel evacuation  - Patient reports decrease in discomfort with pelvic touch to 0-1/10 for improved tolerance to manual therapy and self-treatment techniques.   - Patient reports able to cough/laugh/sneeze without urinary leakage in order to improve quality of life  - Patient is able to perform ADLs without increase in pelvic pain    Plan  Patient would benefit from: women's health eval and skilled physical therapy    Planned therapy interventions: manual therapy, neuromuscular re-education, patient education, self care, strengthening, stretching, home exercise program, behavior modification, therapeutic activities, therapeutic exercise and joint mobilization    Frequency: 1x week  Duration in weeks: 10  Plan of Care expiration date: 9/25/2025  Treatment plan discussed with: patient      PT Pelvic Floor Subjective:   History of Present Illness:   38 yo female returning to pelvic PT with persistent pelvic pain impacting quality of life. Pt reports persistent left pudendal nerve pain, which has been management through PFM stretches. Pt notes fluctuating left lower abdominal pain, but denies impacting of BM on abdominal pain. Pt c/o severe flare-up of left vaginal pain the last few weeks with unknown trigger. Pt had a pelvic/abdominal US which was clear. Pt notes she continues to struggle with bacterial vaginal infections.     Pt was recently treated at only PT clinic with the focus on core and PFM strengthening, but returning to pelvic PT for internal vaginal assessment for further clarity and direction on how to address her severe pelvic pain.      Pudendal Nerve Pain (Left glut)   At worst: 4-5/10 (prolong walking or sitting)  At best: 1/10 (at rest)    Left Vaginal Pain:  At worst: 9-10/10 (stressing, repeated lifting)  At best: 6/10 (reclined position)    Left Abdominal:  At worst: 5/10  At best: 0/10  Diet and Exercise:      Walking  OB/ gyn History    Gestational History:      "Prior Pregnancy: Yes      Number of prior pregnancies: 2    Number of term pregnancies: 2    Delivery Type: vaginal delivery and  section      Number of vaginal deliveries: 1    Number of caesarian sections: 1    Delivery Complications:  Mild tear    Menstrual History:      Menstrual irregularities irregular menses    Painful periods:  Difficulty managing menstrual pain    Birth control: no contraception  Bladder Function:     Voiding Difficulties positive for: urgency, frequent urination and hesitancy (in the morning when bladder full)       Voiding Difficulties comments:     Voiding frequency: every 31-60 minutes    Urinary leakage: urine leakage    Urinary leakage aggravated by: coughing, sneezing and laughing    Urinary leakage not aggravated by: bending, standing up and walking to the bathroom    Nocturia (episodes per night): 0 and 1 (every 3rd night)    Painful urination: No      Intake (ounces): Water: 48, Coffee intake (oz): 8-16.   Incontinence Management:     Pads/Diaper Use:  None    Patient has attempted at least 4 weeks of independent pelvic floor strengthening exercises without a resolution of symptoms  Bowel Function:     Voiding DIfficulties: urgency and constipation      Bowel frequency: daily    [place] Stool Scale: type 3 and type 2    Stool softener use: no stool softeners    Uses \"squatty potty\": Squatty Potty  Sexual Function:     Sexually Active:  Sexually active    Pain during intercourse: Yes      pain causes abstinence    Patient wishes to return to having intercourse: currently unable to have intercourse but wants to  Treatments:     Previous treatment:  Physical therapy      Objective       Abdominal Assessment:      Position: supine exam    Visual Inspection of Perineum:   Excursion of perineal body in cephalad direction with contraction of pelvic floor muscles (PFM): weak  Excursion of perineal body in caudal direction with relaxation of pelvic floor muscles (PFM): weak  "       Pelvic Floor Muscle Exam:     Muscle Contraction: well isolated   Breathing pattern with contraction: apical   Pelvic floor muscle relaxation is incomplete.         PERFECT Score        Perfect Score: Right PFM: Anterior: 2/5, 1 sec hold; Posterior: 2/5, 1 sec hold  Left PFM: Anterior: 2/5, 1 sec hold; Posterior: 2/5, 1 sec hold     Trace TA co-contraction with PFM contraction  PFM co-contraction noted with trace TA contraction    Palpation: moderate ttp and tension in b/l OI, levator ani, and right anterior PFM  Moderate ttp in left endopelvic fascia  No ttp in right endopelvic fascia  Moderate-severe spasm with ttp along left bulbocavernosus   Mild ttp in right bulbocavernosus   Moderate ttp with muscle spasm in left ischiococcygeus and along pudendal nerve pathway        pelvic floor exam consent given by patient    Pelvic exam completed: vaginally              Precautions: history of pelvic pain/pudendal nerve pain, difficulty emptying bladder post       Manuals             STM to left endopelvic fascia nv            STM to left bulbo             STM to left ischiococcygeus nv                         Neuro Re-Ed             Supine Kegel>TA             PFM relaxation with breathing             Quadruped pelvic rocking                                                                 Ther Ex                                                                                                                     Ther Activity                                       Gait Training                                       Modalities

## 2025-07-17 NOTE — PROGRESS NOTES
PT Evaluation     Today's date: 2025  Patient name: Marilu Richards  : 1985  MRN: [id number]  Referring provider: Elma Marie MD  Dx:   Encounter Diagnosis     ICD-10-CM    1. Pelvic and perineal pain  R10.2       2. Pain in left hip  M25.552       3. Other chronic pain  G89.29       4. Other specified disorders of muscle  M62.89                      Assessment  Impairments: abnormal muscle tone, abnormal or restricted ROM, impaired physical strength, lacks appropriate home exercise program and poor posture     Assessment details: Marilu Richards is a 39 y.o. female who is returning to pelvic PT due to recent severe flare-up of chronic left vaginal pain and persistent left pudendal nerve pain and left lower abdominal pain. Pelvic floor anatomy was explained to patient utilizing a pelvic model and examination technique was discussed, including all risks and precautions. Patient provided verbal and written consent for internal pelvic floor muscle assessment prior to examination. Demonstrates moderate-severe ttp and spasm in left bulbocavernosus and left ischiococcygeus muscle, reproduction of sx with palpation to left endopelvic fascia, decrease TA/core strength, decrease in PFM strength/endurance and decrease PFM relaxation. Demonstrated mild decrease in left bulbo. muscle spasm with STM to left endopelvic fascia. Patient presents with the below outlined deficits and is appropriate for skilled physical therapy in order to address deficits and ultimately meet goal of independent self management of condition. Thank you for this referral!    Patient would benefit from referral to a pelvic specialist to discuss if patient is a good candidate for a pudendal nerve injection/block to address her chronic pelvic pain.      Barriers to therapy: Limited availability with pelvic PT.    Goals  Impairment Goals upon discharge  - Decrease left pelvic pain to 0/10 at rest  - Improve MMT for pelvic floor muscle  (PFM) to greater than or equal to 3/5 in order to improve lumbopelvic stability  - Increase b/l LE strength to 4+/5 to improve lumbopelvic stability  - Improve relaxation of PFM to 100% in 2 seconds  - Demonstrates appropriate breathing mechanics with pelvic floor relaxation and contraction for improved muscle coordination in 8 weeks    Functional Goals Upon Discharge  - Pt will be independent with home exercise program in order to improve functional abilities and quality of life  - Patient is knowledgeable of postural and pelvic floor relaxation strategies to optimize toileting techniques for improved bowel evacuation  - Patient reports decrease in discomfort with pelvic touch to 0-1/10 for improved tolerance to manual therapy and self-treatment techniques.   - Patient reports able to cough/laugh/sneeze without urinary leakage in order to improve quality of life  - Patient is able to perform ADLs without increase in pelvic pain    Plan  Patient would benefit from: women's health eval and skilled physical therapy    Planned therapy interventions: manual therapy, neuromuscular re-education, patient education, self care, strengthening, stretching, home exercise program, behavior modification, therapeutic activities, therapeutic exercise and joint mobilization    Frequency: 1x week  Duration in weeks: 10  Plan of Care expiration date: 9/25/2025  Treatment plan discussed with: patient      PT Pelvic Floor Subjective:   History of Present Illness:   38 yo female returning to pelvic PT with persistent pelvic pain impacting quality of life. Pt reports persistent left pudendal nerve pain, which has been management through PFM stretches. Pt notes fluctuating left lower abdominal pain, but denies impacting of BM on abdominal pain. Pt c/o severe flare-up of left vaginal pain the last few weeks with unknown trigger. Pt had a pelvic/abdominal US which was clear. Pt notes she continues to struggle with bacterial vaginal  "infections.     Pt was recently treated at only PT clinic with the focus on core and PFM strengthening, but returning to pelvic PT for internal vaginal assessment for further clarity and direction on how to address her severe pelvic pain.      Pudendal Nerve Pain (Left glut)   At worst: 4-5/10 (prolong walking or sitting)  At best: 1/10 (at rest)    Left Vaginal Pain:  At worst: 9-10/10 (stressing, repeated lifting)  At best: 6/10 (reclined position)    Left Abdominal:  At worst: 5/10  At best: 0/10  Diet and Exercise:      Walking  OB/ gyn History    Gestational History:     Prior Pregnancy: Yes      Number of prior pregnancies: 2    Number of term pregnancies: 2    Delivery Type: vaginal delivery and  section      Number of vaginal deliveries: 1    Number of caesarian sections: 1    Delivery Complications:  Mild tear    Menstrual History:      Menstrual irregularities irregular menses    Painful periods:  Difficulty managing menstrual pain    Birth control: no contraception  Bladder Function:     Voiding Difficulties positive for: urgency, frequent urination and hesitancy (in the morning when bladder full)       Voiding Difficulties comments:     Voiding frequency: every 31-60 minutes    Urinary leakage: urine leakage    Urinary leakage aggravated by: coughing, sneezing and laughing    Urinary leakage not aggravated by: bending, standing up and walking to the bathroom    Nocturia (episodes per night): 0 and 1 (every 3rd night)    Painful urination: No      Intake (ounces): Water: 48, Coffee intake (oz): 8-16.   Incontinence Management:     Pads/Diaper Use:  None    Patient has attempted at least 4 weeks of independent pelvic floor strengthening exercises without a resolution of symptoms  Bowel Function:     Voiding DIfficulties: urgency and constipation      Bowel frequency: daily    [place] Stool Scale: type 3 and type 2    Stool softener use: no stool softeners    Uses \"squatty potty\": Squatty " Potty  Sexual Function:     Sexually Active:  Sexually active    Pain during intercourse: Yes      pain causes abstinence    Patient wishes to return to having intercourse: currently unable to have intercourse but wants to  Treatments:     Previous treatment:  Physical therapy      Objective       Abdominal Assessment:      Position: supine exam    Visual Inspection of Perineum:   Excursion of perineal body in cephalad direction with contraction of pelvic floor muscles (PFM): weak  Excursion of perineal body in caudal direction with relaxation of pelvic floor muscles (PFM): weak        Pelvic Floor Muscle Exam:     Muscle Contraction: well isolated   Breathing pattern with contraction: apical   Pelvic floor muscle relaxation is incomplete.         PERFECT Score        Perfect Score: Right PFM: Anterior: 2/5, 1 sec hold; Posterior: 2/5, 1 sec hold  Left PFM: Anterior: 2/5, 1 sec hold; Posterior: 2/5, 1 sec hold     Trace TA co-contraction with PFM contraction  PFM co-contraction noted with trace TA contraction    Palpation: moderate ttp and tension in b/l OI, levator ani, and right anterior PFM  Moderate ttp in left endopelvic fascia  No ttp in right endopelvic fascia  Moderate-severe spasm with ttp along left bulbocavernosus   Mild ttp in right bulbocavernosus   Moderate ttp with muscle spasm in left ischiococcygeus and along pudendal nerve pathway        pelvic floor exam consent given by patient    Pelvic exam completed: vaginally              Precautions: history of pelvic pain/pudendal nerve pain, difficulty emptying bladder post       Manuals             STM to left endopelvic fascia nv            STM to left bulbo             STM to left ischiococcygeus nv                         Neuro Re-Ed             Supine Kegel>TA             PFM relaxation with breathing             Quadruped pelvic rocking                                                                 Ther Ex                                                                                                                      Ther Activity                                       Gait Training                                       Modalities

## 2025-07-17 NOTE — LETTER
2025    No Recipients    Patient: Marilu Richards   YOB: 1985   Date of Visit: 2025     Encounter Diagnosis     ICD-10-CM    1. Pelvic and perineal pain  R10.2       2. Pain in left hip  M25.552       3. Other chronic pain  G89.29       4. Other specified disorders of muscle  M62.89           Dear Dr. Elma Marie MD:    Thank you for your recent referral of Marilu Richards. Please review the attached evaluation summary from Marilu's recent visit.     Please verify that you agree with the plan of care by signing the attached order.     If you have any questions or concerns, please do not hesitate to call.     I sincerely appreciate the opportunity to share in the care of one of your patients and hope to have another opportunity to work with you in the near future.       Sincerely,    Michelle Machuca, PT      Referring Provider:      I certify that I have read the below Plan of Care and certify the need for these services furnished under this plan of treatment while under my care.                    Elma Marie MD  [address] [place] [address]  Via Fax: [phone]          PT Evaluation     Today's date: 2025  Patient name: Marilu Richards  : 1985  MRN: [id number]  Referring provider: Elma Marie MD  Dx:   Encounter Diagnosis     ICD-10-CM    1. Pelvic and perineal pain  R10.2       2. Pain in left hip  M25.552       3. Other chronic pain  G89.29       4. Other specified disorders of muscle  M62.89                      Assessment  Impairments: abnormal muscle tone, abnormal or restricted ROM, impaired physical strength, lacks appropriate home exercise program and poor posture     Assessment details: Marilu Richards is a 39 y.o. female who is returning to pelvic PT due to recent severe flare-up of chronic left vaginal pain and persistent left pudendal nerve pain and left lower abdominal pain. Pelvic floor anatomy was explained to patient utilizing a  pelvic model and examination technique was discussed, including all risks and precautions. Patient provided verbal and written consent for internal pelvic floor muscle assessment prior to examination. Demonstrates moderate-severe ttp and spasm in left bulbocavernosus and left ischiococcygeus muscle, reproduction of sx with palpation to left endopelvic fascia, decrease TA/core strength, decrease in PFM strength/endurance and decrease PFM relaxation. Demonstrated mild decrease in left bulbo. muscle spasm with STM to left endopelvic fascia. Patient presents with the below outlined deficits and is appropriate for skilled physical therapy in order to address deficits and ultimately meet goal of independent self management of condition. Thank you for this referral!    Patient would benefit from referral to a pelvic specialist to discuss if patient is a good candidate for a pudendal nerve injection/block to address her chronic pelvic pain.  Barriers to therapy: Limited availability with pelvic PT.    Goals  Impairment Goals upon discharge  - Decrease left pelvic pain to 0/10 at rest  - Improve MMT for pelvic floor muscle (PFM) to greater than or equal to 3/5 in order to improve lumbopelvic stability  - Increase b/l LE strength to 4+/5 to improve lumbopelvic stability  - Improve relaxation of PFM to 100% in 2 seconds  - Demonstrates appropriate breathing mechanics with pelvic floor relaxation and contraction for improved muscle coordination in 8 weeks    Functional Goals Upon Discharge  - Pt will be independent with home exercise program in order to improve functional abilities and quality of life  - Patient is knowledgeable of postural and pelvic floor relaxation strategies to optimize toileting techniques for improved bowel evacuation  - Patient reports decrease in discomfort with pelvic touch to 0-1/10 for improved tolerance to manual therapy and self-treatment techniques.   - Patient reports able to cough/laugh/sneeze  without urinary leakage in order to improve quality of life  - Patient is able to perform ADLs without increase in pelvic pain    Plan  Patient would benefit from: women's health eval and skilled physical therapy    Planned therapy interventions: manual therapy, neuromuscular re-education, patient education, self care, strengthening, stretching, home exercise program, behavior modification, therapeutic activities, therapeutic exercise and joint mobilization    Frequency: 1x week  Duration in weeks: 10  Plan of Care expiration date: 2025  Treatment plan discussed with: patient        PT Pelvic Floor Subjective:   History of Present Illness:   40 yo female returning to pelvic PT with persistent pelvic pain impacting quality of life. Pt reports persistent left pudendal nerve pain, which has been management through PFM stretches. Pt notes fluctuating left lower abdominal pain, but denies impacting of BM on abdominal pain. Pt c/o severe flare-up of left vaginal pain the last few weeks with unknown trigger. Pt had a pelvic/abdominal US which was clear. Pt notes she continues to struggle with bacterial vaginal infections.     Pt was recently treated at only PT clinic with the focus on core and PFM strengthening, but returning to pelvic PT for internal vaginal assessment for further clarity and direction on how to address her severe pelvic pain.      Pudendal Nerve Pain (Left glut)   At worst: 4-5/10 (prolong walking or sitting)  At best: 1/10 (at rest)    Left Vaginal Pain:  At worst: 9-10/10 (stressing, repeated lifting)  At best: 6/10 (reclined position)    Left Abdominal:  At worst: 5/10  At best: 0/10  Diet and Exercise:      Walking  OB/ gyn History    Gestational History:     Prior Pregnancy: Yes      Number of prior pregnancies: 2    Number of term pregnancies: 2    Delivery Type: vaginal delivery and  section      Number of vaginal deliveries: 1    Number of caesarian sections: 1    Delivery  "Complications:  Mild tear    Menstrual History:      Menstrual irregularities irregular menses    Painful periods:  Difficulty managing menstrual pain    Birth control: no contraception  Bladder Function:     Voiding Difficulties positive for: urgency, frequent urination and hesitancy (in the morning when bladder full)       Voiding Difficulties comments:     Voiding frequency: every 31-60 minutes    Urinary leakage: urine leakage    Urinary leakage aggravated by: coughing, sneezing and laughing    Urinary leakage not aggravated by: bending, standing up and walking to the bathroom    Nocturia (episodes per night): 0 and 1 (every 3rd night)    Painful urination: No      Intake (ounces): Water: 48, Coffee intake (oz): 8-16.   Incontinence Management:     Pads/Diaper Use:  None    Patient has attempted at least 4 weeks of independent pelvic floor strengthening exercises without a resolution of symptoms  Bowel Function:     Voiding DIfficulties: urgency and constipation      Bowel frequency: daily    [place] Stool Scale: type 3 and type 2    Stool softener use: no stool softeners    Uses \"squatty potty\": Squatty Potty  Sexual Function:     Sexually Active:  Sexually active    Pain during intercourse: Yes      pain causes abstinence    Patient wishes to return to having intercourse: currently unable to have intercourse but wants to  Treatments:     Previous treatment:  Physical therapy      Objective       Abdominal Assessment:      Position: supine exam    Visual Inspection of Perineum:   Excursion of perineal body in cephalad direction with contraction of pelvic floor muscles (PFM): weak  Excursion of perineal body in caudal direction with relaxation of pelvic floor muscles (PFM): weak        Pelvic Floor Muscle Exam:     Muscle Contraction: well isolated   Breathing pattern with contraction: apical   Pelvic floor muscle relaxation is incomplete.         PERFECT Score        Perfect Score: Right PFM: Anterior: 2/5, 1 " sec hold; Posterior: 2/5, 1 sec hold  Left PFM: Anterior: 2/5, 1 sec hold; Posterior: 2/5, 1 sec hold     Trace TA co-contraction with PFM contraction  PFM co-contraction noted with trace TA contraction    Palpation: moderate ttp and tension in b/l OI, levator ani, and right anterior PFM  Moderate ttp in left endopelvic fascia  No ttp in right endopelvic fascia  Moderate-severe spasm with ttp along left bulbocavernosus   Mild ttp in right bulbocavernosus   Moderate ttp with muscle spasm in left ischiococcygeus and along pudendal nerve pathway        pelvic floor exam consent given by patient    Pelvic exam completed: vaginally                Precautions: history of pelvic pain/pudendal nerve pain, difficulty emptying bladder post       Manuals             STM to left endopelvic fascia nv            STM to left bulbo             STM to left ischiococcygeus nv                         Neuro Re-Ed             Supine Kegel>TA             PFM relaxation with breathing             Quadruped pelvic rocking                                                                 Ther Ex                                                                                                                     Ther Activity                                       Gait Training                                       Modalities

## 2025-07-24 ENCOUNTER — APPOINTMENT (OUTPATIENT)
Dept: PHYSICAL THERAPY | Facility: CLINIC | Age: 40
End: 2025-07-24